# Patient Record
Sex: MALE | Race: WHITE | NOT HISPANIC OR LATINO | Employment: OTHER | URBAN - METROPOLITAN AREA
[De-identification: names, ages, dates, MRNs, and addresses within clinical notes are randomized per-mention and may not be internally consistent; named-entity substitution may affect disease eponyms.]

---

## 2019-07-24 ENCOUNTER — OFFICE VISIT (OUTPATIENT)
Dept: CARDIOLOGY | Facility: CLINIC | Age: 84
End: 2019-07-24

## 2019-07-24 VITALS
SYSTOLIC BLOOD PRESSURE: 120 MMHG | BODY MASS INDEX: 31.07 KG/M2 | HEART RATE: 60 BPM | HEIGHT: 70 IN | WEIGHT: 217 LBS | DIASTOLIC BLOOD PRESSURE: 68 MMHG

## 2019-07-24 DIAGNOSIS — E78.5 HYPERLIPIDEMIA, UNSPECIFIED HYPERLIPIDEMIA TYPE: ICD-10-CM

## 2019-07-24 DIAGNOSIS — I10 ESSENTIAL HYPERTENSION: ICD-10-CM

## 2019-07-24 DIAGNOSIS — E11.9 TYPE 2 DIABETES MELLITUS WITHOUT COMPLICATION, WITHOUT LONG-TERM CURRENT USE OF INSULIN (HCC): ICD-10-CM

## 2019-07-24 DIAGNOSIS — Z95.1 HISTORY OF CORONARY ARTERY BYPASS SURGERY: ICD-10-CM

## 2019-07-24 DIAGNOSIS — I25.10 CORONARY ARTERY DISEASE INVOLVING NATIVE CORONARY ARTERY OF NATIVE HEART WITHOUT ANGINA PECTORIS: Primary | ICD-10-CM

## 2019-07-24 PROCEDURE — 99213 OFFICE O/P EST LOW 20 MIN: CPT | Performed by: INTERNAL MEDICINE

## 2019-07-24 NOTE — PROGRESS NOTES
[facility] HEART SPECIALISTS        Subjective:     Encounter Date:07/24/2019      Patient ID: Shaheen Saini is a 84 y.o. male.      HPI: I saw Shaheen Saini today for continued cardiovascular care.  He is a pleasant 84-year-old male with a history of coronary heart disease.  He underwent coronary artery bypass surgery in 2005.  He remains active and is free of angina.  He does not report any significant or progressive dyspnea on exertion and is free of orthopnea or PND no lower extremity edema.  He denies syncope near syncope or palpitation.  We obtain an echocardiogram January 2018 which demonstrated preserved left ventricular function mild mitral insufficiency mild to moderate tricuspid insufficiency.  His last stress test was in 2012.  He is resistant to undergoing a stress test.    He has a history of dyslipidemia and remains on atorvastatin 20 mg daily.  He has type 2 diabetes and his diet and oral agent control.  He has a history of essential hypertension which is controlled with salt restriction atenolol and lisinopril.      The following portions of the patient's history were reviewed and updated as appropriate: allergies, current medications, past family history, past medical history, past social history, past surgical history and problem list.    Problem List:  Patient Active Problem List   Diagnosis   • Diabetes mellitus (CMS/[facility])   • Hyperlipidemia   • Hypertension   • GERD (gastroesophageal reflux disease)   • CAD (coronary artery disease)   • History of echocardiogram   • History of cardiac cath   • History of coronary artery bypass surgery       Past Medical History:  Past Medical History:   Diagnosis Date   • CAD (coronary artery disease)    • Diabetes mellitus (CMS/HCC)    • GERD (gastroesophageal reflux disease)    • History of cardiac cath     7/23/12 - LVEF 65%. 1+ MV insufficiency. Three vessel CAD. Three of four bypass grafts remain patent. The vein graft to the third marginal branch is a  previously documented occlusion. The distal circumflex is unchanged from angiogram 5/4/09. 5/4/09 - LVEF 65%.Three vessel CAD s/p CABG. One fraft to marginal branch is occluded, the rest of the grafts are patent. The marginal branch is diffusely dis   • History of echocardiogram     1/9/18 - LA mildly dilated. Normal LV function. Mild MR. Mild-moderate TR. Mild AR. 7/22/2012 - EF 60-65%. Moderate MI, TI, and PI. Mild AI.   • Hyperlipidemia    • Hypertension    • Paroxysmal atrial fibrillation (CMS/HCC)        Past Surgical History:  Past Surgical History:   Procedure Laterality Date   • CORONARY ARTERY BYPASS GRAFT  09/06/2005    CABG x4. Internal mammary artery to LAD, vein graft to diagonal artery, vein graft to OM, vein graft to posterior descending artery.       Social History:  Social History     Socioeconomic History   • Marital status:      Spouse name: Not on file   • Number of children: Not on file   • Years of education: Not on file   • Highest education level: Not on file   Tobacco Use   • Smoking status: Never Smoker   Substance and Sexual Activity   • Alcohol use: No     Frequency: Never       Allergies:  No Known Allergies      ROS:  Review of Systems   Constitution: Negative for weakness and malaise/fatigue.   HENT: Negative for hearing loss and nosebleeds.    Eyes: Negative for double vision and visual disturbance.   Cardiovascular: Negative for chest pain, claudication, dyspnea on exertion, near-syncope, orthopnea, palpitations, paroxysmal nocturnal dyspnea and syncope.   Respiratory: Negative for cough, shortness of breath, sleep disturbances due to breathing, snoring, sputum production and wheezing.    Endocrine: Negative for cold intolerance, heat intolerance, polydipsia and polyuria.   Hematologic/Lymphatic: Negative for adenopathy and bleeding problem. Does not bruise/bleed easily.   Skin: Negative for flushing and itching.   Musculoskeletal: Negative for back pain, falls, joint pain,  "joint swelling, muscle weakness and neck pain.   Gastrointestinal: Negative for abdominal pain, dysphagia, heartburn, nausea and vomiting.   Genitourinary: Negative for dysuria and frequency.   Neurological: Negative for disturbances in coordination, dizziness, light-headedness, loss of balance and numbness.   Psychiatric/Behavioral: Negative for altered mental status and memory loss. The patient is not nervous/anxious.    Allergic/Immunologic: Negative for hives and persistent infections.          Objective:         /68   Pulse 60   Ht 177.8 cm (70\")   Wt 98.4 kg (217 lb)   BMI 31.14 kg/m²     Physical Exam   Cardiovascular: Exam reveals gallop and S4.   1/6 systolic murmur left sternal border       In-Office Procedure(s):  Procedures    ASCVD RIsk Score::  The ASCVD Risk score (Suleonela MEEK Jr., et al., 2013) failed to calculate for the following reasons:    The 2013 ASCVD risk score is only valid for ages 40 to 79        Assessment/Plan:         1. Coronary artery disease involving native coronary artery of native heart without angina pectoris  Stable free of angina    2. History of coronary artery bypass surgery  Stable    3. Essential hypertension  Controlled    4. Hyperlipidemia, unspecified hyperlipidemia type  Remains on atorvastatin 20 mg daily followed by Dr. Lawrence    5. Type 2 diabetes mellitus without complication, without long-term current use of insulin (CMS/[facility])  Diet and oral agent    Mr. Saini remains active he works in his yard and remains free of angina or any respiratory insufficiency.  He does not wish to undergo noninvasive ischemic assessment even though his last stress test was in 2012 I made no changes in his medicines have encouraged him to maintain his  activity and risk modification.  I will see him in follow-up in 6 months.  He will contact us if there is any change in his energy level respiratory status or chest symptoms.           Tommy Ellis MD  07/24/19  .    "

## 2019-08-26 DIAGNOSIS — I50.9 CONGESTIVE HEART FAILURE, UNSPECIFIED HF CHRONICITY, UNSPECIFIED HEART FAILURE TYPE (HCC): Primary | ICD-10-CM

## 2019-08-26 DIAGNOSIS — I25.10 CORONARY ARTERY DISEASE INVOLVING NATIVE CORONARY ARTERY OF NATIVE HEART WITHOUT ANGINA PECTORIS: ICD-10-CM

## 2019-08-26 RX ORDER — RANOLAZINE 500 MG/1
500 TABLET, EXTENDED RELEASE ORAL 2 TIMES DAILY
Qty: 180 TABLET | Refills: 1 | Status: SHIPPED | OUTPATIENT
Start: 2019-08-26 | End: 2020-02-05 | Stop reason: SDUPTHER

## 2020-01-13 ENCOUNTER — APPOINTMENT (OUTPATIENT)
Dept: GENERAL RADIOLOGY | Facility: HOSPITAL | Age: 85
End: 2020-01-13

## 2020-01-13 PROCEDURE — 71046 X-RAY EXAM CHEST 2 VIEWS: CPT | Performed by: EMERGENCY MEDICINE

## 2020-01-31 ENCOUNTER — OFFICE VISIT (OUTPATIENT)
Dept: CARDIOLOGY | Facility: CLINIC | Age: 85
End: 2020-01-31

## 2020-01-31 VITALS
RESPIRATION RATE: 16 BRPM | BODY MASS INDEX: 30.64 KG/M2 | SYSTOLIC BLOOD PRESSURE: 122 MMHG | WEIGHT: 214 LBS | DIASTOLIC BLOOD PRESSURE: 58 MMHG | HEART RATE: 63 BPM | HEIGHT: 70 IN

## 2020-01-31 DIAGNOSIS — E11.9 TYPE 2 DIABETES MELLITUS WITHOUT COMPLICATION, WITHOUT LONG-TERM CURRENT USE OF INSULIN (HCC): ICD-10-CM

## 2020-01-31 DIAGNOSIS — Z95.1 HISTORY OF CORONARY ARTERY BYPASS SURGERY: ICD-10-CM

## 2020-01-31 DIAGNOSIS — I10 ESSENTIAL HYPERTENSION: ICD-10-CM

## 2020-01-31 DIAGNOSIS — I35.1 AORTIC VALVE INSUFFICIENCY, ETIOLOGY OF CARDIAC VALVE DISEASE UNSPECIFIED: ICD-10-CM

## 2020-01-31 DIAGNOSIS — E78.5 HYPERLIPIDEMIA, UNSPECIFIED HYPERLIPIDEMIA TYPE: ICD-10-CM

## 2020-01-31 DIAGNOSIS — I25.10 CORONARY ARTERY DISEASE INVOLVING NATIVE CORONARY ARTERY OF NATIVE HEART WITHOUT ANGINA PECTORIS: Primary | ICD-10-CM

## 2020-01-31 PROCEDURE — 99213 OFFICE O/P EST LOW 20 MIN: CPT | Performed by: INTERNAL MEDICINE

## 2020-01-31 NOTE — PROGRESS NOTES
[facility] HEART SPECIALISTS        Subjective:     Encounter Date:01/31/2020      Patient ID: Shaheen Saini is a 84 y.o. male.      HPI: I saw Shaheen Saini today for cardiovascular care.  Mr. Saini is a pleasant and active 84-year-old male.  He reports dyspnea on exertion but he feels this has remained stable.  He does not report orthopnea or PND no lower extremity edema.  He has a history of coronary disease and underwent coronary artery bypass surgery in 2005.  He denies chest pain pressure or tightness.  It is been 8 years since his last stress test.  He remains resistant to proceeding with stress testing.  He is free of syncope near syncope or palpitations.  He tolerates his medications well.  His lipids are monitored by Dr. Pal Lawrence.  Mr. Saini remains on atorvastatin 20 mg daily.      The following portions of the patient's history were reviewed and updated as appropriate: allergies, current medications, past family history, past medical history, past social history, past surgical history and problem list.    Problem List:  Patient Active Problem List   Diagnosis   • Diabetes mellitus (CMS/[facility])   • Hyperlipidemia   • Hypertension   • GERD (gastroesophageal reflux disease)   • CAD (coronary artery disease)   • History of echocardiogram   • History of cardiac cath   • History of coronary artery bypass surgery       Past Medical History:  Past Medical History:   Diagnosis Date   • CAD (coronary artery disease)    • Diabetes mellitus (CMS/[facility])    • GERD (gastroesophageal reflux disease)    • History of cardiac cath     7/23/12 - LVEF 65%. 1+ MV insufficiency. Three vessel CAD. Three of four bypass grafts remain patent. The vein graft to the third marginal branch is a previously documented occlusion. The distal circumflex is unchanged from angiogram 5/4/09. 5/4/09 - LVEF 65%.Three vessel CAD s/p CABG. One fraft to marginal branch is occluded, the rest of the grafts are patent. The marginal branch is  diffusely dis   • History of echocardiogram     1/9/18 - LA mildly dilated. Normal LV function. Mild MR. Mild-moderate TR. Mild AR. 7/22/2012 - EF 60-65%. Moderate MI, TI, and PI. Mild AI.   • Hyperlipidemia    • Hypertension    • Paroxysmal atrial fibrillation (CMS/HCC)        Past Surgical History:  Past Surgical History:   Procedure Laterality Date   • CORONARY ARTERY BYPASS GRAFT  09/06/2005    CABG x4. Internal mammary artery to LAD, vein graft to diagonal artery, vein graft to OM, vein graft to posterior descending artery.       Social History:  Social History     Socioeconomic History   • Marital status:      Spouse name: Not on file   • Number of children: Not on file   • Years of education: Not on file   • Highest education level: Not on file   Tobacco Use   • Smoking status: Never Smoker   Substance and Sexual Activity   • Alcohol use: No     Frequency: Never       Allergies:  No Known Allergies      ROS:  Review of Systems   Constitution: Negative for malaise/fatigue.   HENT: Negative for hearing loss and nosebleeds.    Eyes: Negative for double vision and visual disturbance.   Cardiovascular: Positive for dyspnea on exertion. Negative for chest pain, claudication, near-syncope, orthopnea, palpitations, paroxysmal nocturnal dyspnea and syncope.   Respiratory: Negative for cough, shortness of breath, sleep disturbances due to breathing, snoring, sputum production and wheezing.    Endocrine: Negative for cold intolerance, heat intolerance, polydipsia and polyuria.   Hematologic/Lymphatic: Negative for adenopathy and bleeding problem. Does not bruise/bleed easily.   Skin: Negative for flushing and itching.   Musculoskeletal: Negative for back pain, falls, joint pain, joint swelling, muscle weakness and neck pain.   Gastrointestinal: Negative for abdominal pain, dysphagia, heartburn, nausea and vomiting.   Genitourinary: Negative for dysuria and frequency.   Neurological: Negative for disturbances in  "coordination, dizziness, light-headedness, loss of balance, numbness and weakness.   Psychiatric/Behavioral: Negative for altered mental status and memory loss. The patient is not nervous/anxious.    Allergic/Immunologic: Negative for hives and persistent infections.          Objective:         /58   Pulse 63   Resp 16   Ht 177.8 cm (70\")   Wt 97.1 kg (214 lb)   BMI 30.71 kg/m²     Physical Exam   Constitutional: He is oriented to person, place, and time. He appears well-developed and well-nourished.   HENT:   Head: Normocephalic and atraumatic.   Eyes: Pupils are equal, round, and reactive to light. Conjunctivae and EOM are normal.   Neck: Neck supple. No thyromegaly present.   Cardiovascular: Normal rate, regular rhythm, S1 normal, S2 normal and intact distal pulses. PMI is not displaced. Exam reveals gallop and S4. Exam reveals no S3, no distant heart sounds, no friction rub, no midsystolic click and no opening snap.   No murmur heard.  Pulses:       Carotid pulses are 2+ on the right side, and 2+ on the left side.       Radial pulses are 2+ on the right side, and 2+ on the left side.        Femoral pulses are 2+ on the right side, and 2+ on the left side.       Dorsalis pedis pulses are 2+ on the right side, and 2+ on the left side.        Posterior tibial pulses are 2+ on the right side, and 2+ on the left side.   1/6 systolic murmur left sternal border   Pulmonary/Chest: Effort normal and breath sounds normal. No respiratory distress. He has no wheezes. He has no rales.   Abdominal: Soft. Bowel sounds are normal. He exhibits no distension and no mass. There is no tenderness.   Musculoskeletal: Normal range of motion. He exhibits no edema.   Neurological: He is alert and oriented to person, place, and time.   Skin: Skin is warm and dry. No erythema.   Psychiatric: He has a normal mood and affect.       In-Office Procedure(s):  Procedures    ASCVD RIsk Score::  The ASCVD Risk score ([place] FANTASMA Barrett, et " al., 2013) failed to calculate for the following reasons:    The 2013 ASCVD risk score is only valid for ages 40 to 79        Assessment/Plan:         1. Coronary artery disease involving native coronary artery of native heart without angina pectoris  Stable    2. History of coronary artery bypass surgery  Stable    3. Hyperlipidemia, unspecified hyperlipidemia type  Continue low-cholesterol low saturated fat diet and atorvastatin 20 mg daily weight reduction    4. Essential hypertension  Controlled    5. Type 2 diabetes mellitus without complication, without long-term current use of insulin (CMS/[facility])  Diet, weight reduction and continue oral agent    6. Aortic valve insufficiency, etiology of cardiac valve disease unspecified  Stable    Mr. Saini is free of angina euvolemic and active.  I have discussed with him the importance of continued risk modification.  His blood pressures controlled I recommended LDL level less than 70 HDL less than 150 and continued efforts at weight reduction.  He is resistant to any further cardiac testing at present.  I will see him in 6 months.       Tommy Ellis MD  01/31/20  .

## 2020-02-05 DIAGNOSIS — I25.10 CORONARY ARTERY DISEASE INVOLVING NATIVE CORONARY ARTERY OF NATIVE HEART WITHOUT ANGINA PECTORIS: ICD-10-CM

## 2020-02-05 RX ORDER — RANOLAZINE 500 MG/1
500 TABLET, EXTENDED RELEASE ORAL 2 TIMES DAILY
Qty: 180 TABLET | Refills: 1 | Status: SHIPPED | OUTPATIENT
Start: 2020-02-05 | End: 2020-08-06 | Stop reason: SDUPTHER

## 2020-07-29 ENCOUNTER — TELEMEDICINE (OUTPATIENT)
Dept: CARDIOLOGY | Facility: CLINIC | Age: 85
End: 2020-07-29

## 2020-07-29 VITALS
DIASTOLIC BLOOD PRESSURE: 61 MMHG | HEIGHT: 70 IN | BODY MASS INDEX: 30.35 KG/M2 | WEIGHT: 212 LBS | HEART RATE: 57 BPM | SYSTOLIC BLOOD PRESSURE: 136 MMHG

## 2020-07-29 DIAGNOSIS — I25.10 CORONARY ARTERY DISEASE INVOLVING NATIVE CORONARY ARTERY OF NATIVE HEART WITHOUT ANGINA PECTORIS: Primary | ICD-10-CM

## 2020-07-29 DIAGNOSIS — Z95.1 HISTORY OF CORONARY ARTERY BYPASS SURGERY: ICD-10-CM

## 2020-07-29 DIAGNOSIS — I10 ESSENTIAL HYPERTENSION: ICD-10-CM

## 2020-07-29 DIAGNOSIS — E78.5 HYPERLIPIDEMIA, UNSPECIFIED HYPERLIPIDEMIA TYPE: ICD-10-CM

## 2020-07-29 DIAGNOSIS — E11.9 TYPE 2 DIABETES MELLITUS WITHOUT COMPLICATION, WITHOUT LONG-TERM CURRENT USE OF INSULIN (HCC): ICD-10-CM

## 2020-07-29 PROCEDURE — 99441 PR PHYS/QHP TELEPHONE EVALUATION 5-10 MIN: CPT | Performed by: INTERNAL MEDICINE

## 2020-07-29 NOTE — PROGRESS NOTES
[facility] HEART SPECIALISTS    You have chosen to receive care through a telephone visit. Do you consent to use a telephone visit for your medical care today? Yes    Subjective:     Encounter Date:07/29/2020      Patient ID: Shaheen Saini is a 85 y.o. male.      HPI: Shaheen Saini is a pleasant 85-year-old male who is observing the CDC guidelines for social distancing.  He remains free of fever cough or increased shortness of breath.  He does not report any change in his sense of smell or taste.  Mr. Saldana continues to mow his yard and is free of any chest pain pressure or tightness.  He has his baseline dyspnea on exertion but this is not progressive.  He is free of orthopnea or PND.  He does not report syncope near syncope or palpitations.  He tolerates his medications well.  His lipids remain adequately controlled on atorvastatin 20 mg daily.  He has a history of hypertension which remains controlled.      The following portions of the patient's history were reviewed and updated as appropriate: allergies, current medications, past family history, past medical history, past social history, past surgical history and problem list.    Problem List:  Patient Active Problem List   Diagnosis   • Diabetes mellitus (CMS/HCC)   • Hyperlipidemia   • Hypertension   • GERD (gastroesophageal reflux disease)   • CAD (coronary artery disease)   • History of echocardiogram   • History of cardiac cath   • History of coronary artery bypass surgery       Past Medical History:  Past Medical History:   Diagnosis Date   • CAD (coronary artery disease)    • Diabetes mellitus (CMS/HCC)    • GERD (gastroesophageal reflux disease)    • History of cardiac cath     7/23/12 - LVEF 65%. 1+ MV insufficiency. Three vessel CAD. Three of four bypass grafts remain patent. The vein graft to the third marginal branch is a previously documented occlusion. The distal circumflex is unchanged from angiogram 5/4/09. 5/4/09 - LVEF 65%.Three vessel CAD  "s/p CABG. One fraft to marginal branch is occluded, the rest of the grafts are patent. The marginal branch is diffusely dis   • History of echocardiogram     1/9/18 - LA mildly dilated. Normal LV function. Mild MR. Mild-moderate TR. Mild AR. 7/22/2012 - EF 60-65%. Moderate MI, TI, and PI. Mild AI.   • Hyperlipidemia    • Hypertension    • Paroxysmal atrial fibrillation (CMS/HCC)        Past Surgical History:  Past Surgical History:   Procedure Laterality Date   • CORONARY ARTERY BYPASS GRAFT  09/06/2005    CABG x4. Internal mammary artery to LAD, vein graft to diagonal artery, vein graft to OM, vein graft to posterior descending artery.       Social History:  Social History     Socioeconomic History   • Marital status:      Spouse name: Not on file   • Number of children: Not on file   • Years of education: Not on file   • Highest education level: Not on file   Tobacco Use   • Smoking status: Never Smoker   • Smokeless tobacco: Never Used   Substance and Sexual Activity   • Alcohol use: No     Frequency: Never   • Drug use: Never   • Sexual activity: Defer       Allergies:  No Known Allergies      ROS:  ROS       Objective:         /61   Pulse 57   Ht 177.8 cm (70\")   Wt 96.2 kg (212 lb)   BMI 30.42 kg/m²     Physical Exam    In-Office Procedure(s):  Procedures    ASCVD RIsk Score::  The ASCVD Risk score ([place] FANTASMA Jr., et al., 2013) failed to calculate for the following reasons:    The 2013 ASCVD risk score is only valid for ages 40 to 79        Assessment/Plan:         1. Coronary artery disease involving native coronary artery of native heart without angina pectoris  Stable free of angina    2. History of coronary artery bypass surgery  Stable    3. Essential hypertension  Controlled    4. Hyperlipidemia, unspecified hyperlipidemia type  Controlled    5. Type 2 diabetes mellitus without complication, without long-term current use of insulin (CMS/HCC)  Encourage weight reduction and continue oral " agent    Mr. Camarillo has maintained a good activity level while observing the CDC guidelines for social distancing.  He is free of angina and does not report any significant or progressive respiratory insufficiency.  He tolerates his medications well he understands importance of continued risk modification.  I made no changes in his medication I will see him in follow-up in 6 months.    I spent 8 minutes on telephone visit in 8 minutes completing electronic medical record documentation         Tommy Ellis MD  07/29/20  .

## 2020-08-06 ENCOUNTER — TELEPHONE (OUTPATIENT)
Dept: CARDIOLOGY | Facility: CLINIC | Age: 85
End: 2020-08-06

## 2020-08-06 DIAGNOSIS — I25.10 CORONARY ARTERY DISEASE INVOLVING NATIVE CORONARY ARTERY OF NATIVE HEART WITHOUT ANGINA PECTORIS: ICD-10-CM

## 2020-08-06 RX ORDER — RANOLAZINE 500 MG/1
500 TABLET, EXTENDED RELEASE ORAL 2 TIMES DAILY
Qty: 180 TABLET | Refills: 3 | Status: SHIPPED | OUTPATIENT
Start: 2020-08-06 | End: 2020-08-21

## 2020-08-21 DIAGNOSIS — I25.10 CORONARY ARTERY DISEASE INVOLVING NATIVE CORONARY ARTERY OF NATIVE HEART WITHOUT ANGINA PECTORIS: ICD-10-CM

## 2020-08-21 RX ORDER — RANOLAZINE 500 MG/1
TABLET, EXTENDED RELEASE ORAL
Qty: 180 TABLET | Refills: 1 | Status: SHIPPED | OUTPATIENT
Start: 2020-08-21 | End: 2021-08-24

## 2021-02-02 PROBLEM — I35.1 MILD AORTIC INSUFFICIENCY: Status: ACTIVE | Noted: 2021-02-02

## 2021-02-02 PROBLEM — Z95.1 HISTORY OF CORONARY ARTERY BYPASS SURGERY: Chronic | Status: ACTIVE | Noted: 2019-07-24

## 2021-02-02 PROBLEM — I35.1 MILD AORTIC INSUFFICIENCY: Chronic | Status: ACTIVE | Noted: 2021-02-02

## 2021-02-03 ENCOUNTER — TELEMEDICINE (OUTPATIENT)
Dept: CARDIOLOGY | Facility: CLINIC | Age: 86
End: 2021-02-03

## 2021-02-03 VITALS
WEIGHT: 212 LBS | HEART RATE: 55 BPM | HEIGHT: 70 IN | DIASTOLIC BLOOD PRESSURE: 66 MMHG | SYSTOLIC BLOOD PRESSURE: 136 MMHG | BODY MASS INDEX: 30.35 KG/M2

## 2021-02-03 DIAGNOSIS — I25.10 CORONARY ARTERY DISEASE INVOLVING NATIVE CORONARY ARTERY OF NATIVE HEART WITHOUT ANGINA PECTORIS: Primary | Chronic | ICD-10-CM

## 2021-02-03 DIAGNOSIS — I10 ESSENTIAL HYPERTENSION: Chronic | ICD-10-CM

## 2021-02-03 DIAGNOSIS — I35.1 MILD AORTIC INSUFFICIENCY: Chronic | ICD-10-CM

## 2021-02-03 DIAGNOSIS — E78.5 HYPERLIPIDEMIA, UNSPECIFIED HYPERLIPIDEMIA TYPE: Chronic | ICD-10-CM

## 2021-02-03 DIAGNOSIS — Z95.1 HISTORY OF CORONARY ARTERY BYPASS SURGERY: Chronic | ICD-10-CM

## 2021-02-03 DIAGNOSIS — E11.9 TYPE 2 DIABETES MELLITUS WITHOUT COMPLICATION, WITHOUT LONG-TERM CURRENT USE OF INSULIN (HCC): Chronic | ICD-10-CM

## 2021-02-03 PROCEDURE — 99443 PR PHYS/QHP TELEPHONE EVALUATION 21-30 MIN: CPT | Performed by: INTERNAL MEDICINE

## 2021-02-03 NOTE — PROGRESS NOTES
"Chief Complaint  Coronary Artery Disease, Hypertension, Hyperlipidemia, and Diabetes    Subjective    History of Present Illness      Shaheen Saini presents to [facility] CARDIOLOG[facility] for telephone clinical visit secondary to the coronavirus pandemic.  He is a pleasant 85-year-old male who observes the CDC guidelines during the coronavirus pandemic.  Mr. Saini has received his first of 2 COVID-19 vaccine injections.  He is free of any chest pain pressure or tightness.  He does not experience any significant or progressive dyspnea on exertion.  He denies orthopnea, PND or lower extremity edema.  He has not experienced syncope near syncope or palpitations.  He has a history of coronary heart disease and underwent coronary artery bypass surgery on 9/6/2005 with a LIMA to the LAD, saphenous vein graft to diagonal, circumflex marginal branch, the right coronary artery PDA branch he has a history of type 2 diabetes managed with diet and glipizide.  He has hyperlipidemia and is on atorvastatin 20 mg daily.  He has a history of hypertension which remains adequately controlled.  His last diagnostic testing was an echocardiogram 1/9/2018 which revealed normal left ventricular contractility mild aortic and mitral insufficiency, mild to moderate tricuspid insufficiency.His last nuclear stress test was 7/22/2012 which revealed ejection fraction 72%, large moderate to severe inferior inferolateral wall defect cannot exclude ischemia.  He underwent coronary angiography 7/23/2012 revealing three-vessel coronary artery disease with 3 of 4 bypasses patent the vein graft to the marginal branch was occluded.    Objective   Vital Signs:   /66   Pulse 55   Ht 177.8 cm (70\")   Wt 96.2 kg (212 lb)   BMI 30.42 kg/m²     Physical Exam not performed  Result Review :   The following data was reviewed by: Tommy Ellis MD on 02/03/2021:  Common labs    Common Labsle 3/5/20 3/5/20 3/5/20 6/16/20    0940 0940 0940  "   Glucose   139 (A)    BUN   20    Creatinine   1.0    Sodium   137    Potassium   4.9    Chloride   100    Calcium   9.3    Total Cholesterol  151     Triglycerides  129     HDL Cholesterol  34 (A)     LDL Cholesterol   91     Hemoglobin A1C 6.6 (A)   6.3 (A)   (A) Abnormal value       Comments are available for some flowsheets but are not being displayed.           Data reviewed: Cardiology studies Nuclear stress test 7/22/2012, coronary angiography 7/23/2012, echocardiogram 1/9/2018.          Assessment and Plan    1. Coronary artery disease involving native coronary artery of native heart without angina pectoris  Stable free of angina    2. History of coronary artery bypass surgery  Stable    3. Essential hypertension  Controlled    4. Hyperlipidemia, unspecified hyperlipidemia type  Controlled    5. Type 2 diabetes mellitus without complication, without long-term current use of insulin (CMS/[facility])  Continue diet and oral agent    6. Mild aortic insufficiency  Compensated    Mr. Saini is in good spirits free of angina he does not report any significant respiratory insufficiency.  He tolerates his medications well.  His blood pressure is adequately controlled and his lipids are monitored by his primary care physician.  I have previously recommended we obtain a noninvasive ischemic assessment but he is declined.  I will plan to see him in follow-up in 6 months sooner if needed.      I spent 23 minutes caring for Shaheen on this date of service. This time includes time spent by me in the following activities:preparing for the visit, reviewing tests, counseling and educating the patient/family/caregiver, documenting information in the medical record and independently interpreting results and communicating that information with the patient/family/caregiver  Follow Up   No follow-ups on file.  Patient was given instructions and counseling regarding his condition or for health maintenance advice. Please see specific  information pulled into the AVS if appropriate.

## 2021-08-04 ENCOUNTER — OFFICE VISIT (OUTPATIENT)
Dept: CARDIOLOGY | Facility: CLINIC | Age: 86
End: 2021-08-04

## 2021-08-04 VITALS
RESPIRATION RATE: 18 BRPM | HEART RATE: 60 BPM | HEIGHT: 70 IN | SYSTOLIC BLOOD PRESSURE: 130 MMHG | BODY MASS INDEX: 30.81 KG/M2 | OXYGEN SATURATION: 96 % | WEIGHT: 215.2 LBS | DIASTOLIC BLOOD PRESSURE: 60 MMHG

## 2021-08-04 DIAGNOSIS — Z95.1 HISTORY OF CORONARY ARTERY BYPASS SURGERY: ICD-10-CM

## 2021-08-04 DIAGNOSIS — I35.1 MILD AORTIC INSUFFICIENCY: ICD-10-CM

## 2021-08-04 DIAGNOSIS — E11.9 TYPE 2 DIABETES MELLITUS WITHOUT COMPLICATION, WITHOUT LONG-TERM CURRENT USE OF INSULIN (HCC): ICD-10-CM

## 2021-08-04 DIAGNOSIS — I10 ESSENTIAL HYPERTENSION: ICD-10-CM

## 2021-08-04 DIAGNOSIS — I35.1 AORTIC VALVE INSUFFICIENCY, ETIOLOGY OF CARDIAC VALVE DISEASE UNSPECIFIED: ICD-10-CM

## 2021-08-04 DIAGNOSIS — E78.5 HYPERLIPIDEMIA, UNSPECIFIED HYPERLIPIDEMIA TYPE: ICD-10-CM

## 2021-08-04 DIAGNOSIS — I25.10 CORONARY ARTERY DISEASE INVOLVING NATIVE CORONARY ARTERY OF NATIVE HEART WITHOUT ANGINA PECTORIS: Primary | ICD-10-CM

## 2021-08-04 PROCEDURE — 99214 OFFICE O/P EST MOD 30 MIN: CPT | Performed by: INTERNAL MEDICINE

## 2021-08-04 NOTE — PROGRESS NOTES
"Chief Complaint  Coronary Artery Disease    Subjective    History of Present Illness      I saw Shaheen Saini today for continued cardiovascular care.  He is a very pleasant 86-year-old male who is very active.  He denies chest pain pressure or tightness.  He does not report any unusual or progressive dyspnea on exertion.  He has known coronary heart disease and is undergone previous coronary artery bypass surgery in 2005.  He has been reluctant and in fact resistant to noninvasive ischemic assessment since his last one in 2012.  He does not report orthopnea or PND.  No syncope or palpitations he reports very mild orthostatic dizziness if he bends over for an extended period of time and then stands up.  He is blood pressure is controlled.  He has a history of hyperlipidemia and is on atorvastatin 20 mg daily.  His lipids were assessed 7/14/2021 and are controlled.    Objective   Vital Signs:   /60   Pulse 60   Resp 18   Ht 177.8 cm (70\")   Wt 97.6 kg (215 lb 3.2 oz)   SpO2 96%   BMI 30.88 kg/m²     Constitutional:       Appearance: Well-developed.   Eyes:      Conjunctiva/sclera: Conjunctivae normal.      Pupils: Pupils are equal, round, and reactive to light.   HENT:      Head: Normocephalic and atraumatic.   Neck:      Thyroid: No thyromegaly.   Pulmonary:      Effort: Pulmonary effort is normal. No respiratory distress.      Breath sounds: Normal breath sounds. No wheezing. No rales.   Cardiovascular:      Normal rate. Regular rhythm.      S4 Gallop. No S3 gallop. Midsystolic click click.   Edema:     Peripheral edema absent.   Abdominal:      General: Bowel sounds are normal. There is no distension.      Palpations: Abdomen is soft. There is no abdominal mass.      Tenderness: There is no abdominal tenderness.   Musculoskeletal: Normal range of motion.      Cervical back: Neck supple. Skin:     General: Skin is warm and dry.      Findings: No erythema.   Neurological:      Mental Status: Alert and " oriented to person, place, and time.         Result Review :     Common labs    Common Labsle 7/14/21 7/14/21    0850 0850   Glucose 163 (A)    BUN 22 (A)    Creatinine 1.0    Sodium 136 (A)    Potassium 5.3 (A)    Chloride 99    Calcium 9.7    Hemoglobin A1C  6.5 (A)   (A) Abnormal value       Comments are available for some flowsheets but are not being displayed.                     Assessment and Plan    1. Coronary artery disease involving native coronary artery of native heart without angina pectoris  Stable free of angina    2. History of coronary artery bypass surgery  Stable    3. Essential hypertension  Controlled    4. Hyperlipidemia, unspecified hyperlipidemia type  Controlled    5. Type 2 diabetes mellitus without complication, without long-term current use of insulin (CMS/[facility])  Controlled    6. Mild aortic insufficiency  Compensated    Mr. Saini remains stable from the cardiovascular standpoint.  He is free of angina and euvolemic on physical examination.  I have again counseled and encouraged him to observe a low-cholesterol low saturated fat diet and remain aerobically active.  He will continue his current medical regimen.  I will see him in follow-up in 6 months.  He will call notify me if he develops any chest discomfort or unexplained respiratory insufficiency.    Follow Up   No follow-ups on file.  Patient was given instructions and counseling regarding his condition or for health maintenance advice. Please see specific information pulled into the AVS if appropriate.

## 2021-08-24 DIAGNOSIS — I25.10 CORONARY ARTERY DISEASE INVOLVING NATIVE CORONARY ARTERY OF NATIVE HEART WITHOUT ANGINA PECTORIS: ICD-10-CM

## 2021-08-24 RX ORDER — RANOLAZINE 500 MG/1
TABLET, EXTENDED RELEASE ORAL
Qty: 180 TABLET | Refills: 3 | Status: SHIPPED | OUTPATIENT
Start: 2021-08-24 | End: 2022-10-07 | Stop reason: SDUPTHER

## 2022-03-07 NOTE — PROGRESS NOTES
"Chief Complaint  Coronary Artery Disease, Hypertension, Hyperlipidemia, and Diabetes    Subjective    History of Present Illness      I saw Shaheen Saini today for continued cardiovascular care.  He is a very pleasant 86-year-old male with known coronary heart disease.  Shaheen underwent complete revascularization with bypass surgery in 2005.  He remains active and free of angina.  He has been reluctant in the past to undergo noninvasive ischemic assessments despite my recommendation on several different occasions.  He does not report orthopnea or PND no lower extremity edema.  History of syncope near syncope or palpitation.  His blood pressure is controlled.  His lipids are monitored by his primary care physician, Dr. Pal Lawrence.  His lipids have remained well controlled    Objective   Vital Signs:   /64   Pulse 66   Resp 18   Ht 177.8 cm (70\")   Wt 98.9 kg (218 lb)   SpO2 96%   BMI 31.28 kg/m²     Constitutional:       Appearance: Well-developed.   Eyes:      Conjunctiva/sclera: Conjunctivae normal.      Pupils: Pupils are equal, round, and reactive to light.   HENT:      Head: Normocephalic and atraumatic.   Neck:      Thyroid: No thyromegaly.   Pulmonary:      Effort: Pulmonary effort is normal. No respiratory distress.      Breath sounds: Normal breath sounds. No wheezing. No rales.   Cardiovascular:      Normal rate. Regular rhythm.      Murmurs: There is a grade 1/4 high frequency blowing decrescendo, early diastolic murmur at the URSB, radiating to the apex.      S4 Gallop. No S3 gallop. No rub.   Edema:     Peripheral edema absent.   Abdominal:      General: Bowel sounds are normal. There is no distension.      Palpations: Abdomen is soft. There is no abdominal mass.      Tenderness: There is no abdominal tenderness.   Musculoskeletal: Normal range of motion.      Cervical back: Neck supple. Skin:     General: Skin is warm and dry.      Findings: No erythema.   Neurological:      Mental " Status: Alert and oriented to person, place, and time.         Result Review :     Common labs    Common Labsle 7/14/21 7/14/21 1/19/22 1/19/22    0850 0850 1004 1004   Glucose 163 (A)      BUN 22 (A)      Creatinine 1.0      Sodium 136 (A)      Potassium 5.3 (A)      Chloride 99      Calcium 9.7      Hemoglobin A1C  6.5 (A) 6.6 (A)    PSA    1.01   (A) Abnormal value       Comments are available for some flowsheets but are not being displayed.                     Assessment and Plan    1. Coronary artery disease involving native coronary artery of native heart without angina pectoris  Stable free of angina    2. History of coronary artery bypass surgery  Stable    3. Essential hypertension  Controlled    4. Hyperlipidemia, unspecified hyperlipidemia type  Controlled    5. Type 2 diabetes mellitus without complication, without long-term current use of insulin (HCC)  Encourage weight reduction    6. Mild aortic insufficiency  Compensated    Shaheen is in good spirits remains active free of angina and is euvolemic on examination.  He will continue his current medical regimen.  I have encouraged him to advance his aerobic activity as warmer months approach.  I have counseled him to continue observing low-cholesterol low saturated fat diet with salt restriction.  I will see him in follow-up in 6 months sooner if needed        Follow Up   No follow-ups on file.  Patient was given instructions and counseling regarding his condition or for health maintenance advice. Please see specific information pulled into the AVS if appropriate.

## 2022-03-08 ENCOUNTER — OFFICE VISIT (OUTPATIENT)
Dept: CARDIOLOGY | Facility: CLINIC | Age: 87
End: 2022-03-08

## 2022-03-08 VITALS
HEIGHT: 70 IN | OXYGEN SATURATION: 96 % | BODY MASS INDEX: 31.21 KG/M2 | WEIGHT: 218 LBS | HEART RATE: 66 BPM | DIASTOLIC BLOOD PRESSURE: 64 MMHG | RESPIRATION RATE: 18 BRPM | SYSTOLIC BLOOD PRESSURE: 128 MMHG

## 2022-03-08 DIAGNOSIS — I10 ESSENTIAL HYPERTENSION: ICD-10-CM

## 2022-03-08 DIAGNOSIS — E11.9 TYPE 2 DIABETES MELLITUS WITHOUT COMPLICATION, WITHOUT LONG-TERM CURRENT USE OF INSULIN: ICD-10-CM

## 2022-03-08 DIAGNOSIS — I35.1 MILD AORTIC INSUFFICIENCY: ICD-10-CM

## 2022-03-08 DIAGNOSIS — Z95.1 HISTORY OF CORONARY ARTERY BYPASS SURGERY: ICD-10-CM

## 2022-03-08 DIAGNOSIS — I25.10 CORONARY ARTERY DISEASE INVOLVING NATIVE CORONARY ARTERY OF NATIVE HEART WITHOUT ANGINA PECTORIS: Primary | ICD-10-CM

## 2022-03-08 DIAGNOSIS — E78.5 HYPERLIPIDEMIA, UNSPECIFIED HYPERLIPIDEMIA TYPE: ICD-10-CM

## 2022-03-08 PROCEDURE — 99214 OFFICE O/P EST MOD 30 MIN: CPT | Performed by: INTERNAL MEDICINE

## 2022-09-13 NOTE — PROGRESS NOTES
"Chief Complaint  Coronary Artery Disease    Subjective    History of Present Illness      I saw Shaheen martel today for cardiovascular care.  He is a pleasant 87-year-old male who remains quite active.  He does not report chest pain pressure or tightness.  He does have his baseline dyspnea on exertion but is free of orthopnea or PND no lower extremity edema.  He denies syncope or palpitations.  He does report recent flare of his allergies with occasional dizziness upon turning his head.  He specifically denies any orthostatic dizziness.  He has known coronary heart disease status post coronary artery bypass surgery in 2005.  I have recommended nuclear stress testing on several occasions but he has been resistant to proceed.  His blood pressure is controlled.  He remains on atorvastatin 20 mg daily.    Objective   Vital Signs:   /68   Pulse 63   Ht 177.8 cm (70\")   Wt 98 kg (216 lb)   BMI 30.99 kg/m²     Constitutional:       Appearance: Well-developed.   Eyes:      Conjunctiva/sclera: Conjunctivae normal.      Pupils: Pupils are equal, round, and reactive to light.   HENT:      Head: Normocephalic and atraumatic.   Neck:      Thyroid: No thyromegaly.   Pulmonary:      Effort: Pulmonary effort is normal. No respiratory distress.      Breath sounds: Normal breath sounds. No wheezing. No rales.   Cardiovascular:      Normal rate. Regular rhythm.      Murmurs: There is a grade 1/4 high frequency blowing decrescendo, early diastolic murmur at the URSB, radiating to the apex.      S4 Gallop. No S3 gallop. No rub.   Edema:     Peripheral edema absent.   Abdominal:      General: Bowel sounds are normal. There is no distension.      Palpations: Abdomen is soft. There is no abdominal mass.      Tenderness: There is no abdominal tenderness.   Musculoskeletal: Normal range of motion.      Cervical back: Neck supple. Skin:     General: Skin is warm and dry.      Findings: No erythema.   Neurological:      Mental " Status: Alert and oriented to person, place, and time.         Result Review :     Common labs    Common Labsle 1/19/22 1/19/22 5/10/22 5/10/22 5/10/22 8/25/22    1004 1004 1040 1040 1040    Glucose     140 (A)    BUN     19    Creatinine    43.7 0.97    Sodium     132 (A)    Potassium     5.2 (A)    Chloride     97 (A)    Calcium     9.0    Hemoglobin A1C 6.6 (A)  6.8 (A)   7.0 (A)   Microalbumin, Urine    <1.2     PSA  1.01       (A) Abnormal value       Comments are available for some flowsheets but are not being displayed.         Fasting lipids 5/10/2022: Triglycerides 180, HDL 39, LDL 78.            Assessment and Plan    1. Coronary artery disease involving native coronary artery of native heart without angina pectoris  Stable free of angina    2. History of coronary artery bypass surgery  Stable    3. Mild aortic insufficiency  Reassess  - Adult Transthoracic Echo Complete W/ Color, Spectral and Contrast if Necessary Per Protocol; Future    4. Primary hypertension  Controlled    5. Hyperlipidemia, unspecified hyperlipidemia type  Low-cholesterol diet and continue atorvastatin    6. Type 2 diabetes mellitus without complication, without long-term current use of insulin ([facility])  Weight reduction    7. Dyspnea, unspecified type  Mildly progressive  - Adult Transthoracic Echo Complete W/ Color, Spectral and Contrast if Necessary Per Protocol; Future     Shaheen overall reports feeling well with some gradual progression of his dyspnea on exertion and generalized fatigue.  He again is reluctant to proceed with nuclear stress testing.  He will agree to an echocardiogram.  I have encouraged him to follow his current medical regimen.  We will arrange for follow-up in 6 months.    Follow Up   No follow-ups on file.  Patient was given instructions and counseling regarding his condition or for health maintenance advice. Please see specific information pulled into the AVS if appropriate.

## 2022-09-14 ENCOUNTER — OFFICE VISIT (OUTPATIENT)
Dept: CARDIOLOGY | Facility: CLINIC | Age: 87
End: 2022-09-14

## 2022-09-14 VITALS
SYSTOLIC BLOOD PRESSURE: 118 MMHG | HEIGHT: 70 IN | DIASTOLIC BLOOD PRESSURE: 68 MMHG | WEIGHT: 216 LBS | HEART RATE: 63 BPM | BODY MASS INDEX: 30.92 KG/M2

## 2022-09-14 DIAGNOSIS — I25.10 CORONARY ARTERY DISEASE INVOLVING NATIVE CORONARY ARTERY OF NATIVE HEART WITHOUT ANGINA PECTORIS: Primary | ICD-10-CM

## 2022-09-14 DIAGNOSIS — R06.00 DYSPNEA, UNSPECIFIED TYPE: ICD-10-CM

## 2022-09-14 DIAGNOSIS — E11.9 TYPE 2 DIABETES MELLITUS WITHOUT COMPLICATION, WITHOUT LONG-TERM CURRENT USE OF INSULIN: ICD-10-CM

## 2022-09-14 DIAGNOSIS — I35.1 MILD AORTIC INSUFFICIENCY: ICD-10-CM

## 2022-09-14 DIAGNOSIS — Z95.1 HISTORY OF CORONARY ARTERY BYPASS SURGERY: ICD-10-CM

## 2022-09-14 DIAGNOSIS — I10 PRIMARY HYPERTENSION: ICD-10-CM

## 2022-09-14 DIAGNOSIS — E78.5 HYPERLIPIDEMIA, UNSPECIFIED HYPERLIPIDEMIA TYPE: ICD-10-CM

## 2022-09-14 PROCEDURE — 99214 OFFICE O/P EST MOD 30 MIN: CPT | Performed by: INTERNAL MEDICINE

## 2022-09-14 RX ORDER — CETIRIZINE HYDROCHLORIDE 10 MG/1
10 TABLET ORAL DAILY
COMMUNITY

## 2022-10-03 ENCOUNTER — HOSPITAL ENCOUNTER (OUTPATIENT)
Dept: CARDIOLOGY | Facility: HOSPITAL | Age: 87
Discharge: HOME | End: 2022-10-03
Admitting: INTERNAL MEDICINE

## 2022-10-03 VITALS
BODY MASS INDEX: 29.26 KG/M2 | SYSTOLIC BLOOD PRESSURE: 118 MMHG | WEIGHT: 216 LBS | DIASTOLIC BLOOD PRESSURE: 68 MMHG | HEIGHT: 72 IN | HEART RATE: 60 BPM

## 2022-10-03 DIAGNOSIS — R06.00 DYSPNEA, UNSPECIFIED TYPE: ICD-10-CM

## 2022-10-03 DIAGNOSIS — I35.1 MILD AORTIC INSUFFICIENCY: ICD-10-CM

## 2022-10-03 PROCEDURE — 93306 TTE W/DOPPLER COMPLETE: CPT

## 2022-10-03 PROCEDURE — 25010000002 PERFLUTREN (DEFINITY) 8.476 MG IN SODIUM CHLORIDE (PF) 0.9 % 10 ML INJECTION: Performed by: INTERNAL MEDICINE

## 2022-10-03 PROCEDURE — 93306 TTE W/DOPPLER COMPLETE: CPT | Performed by: INTERNAL MEDICINE

## 2022-10-03 RX ADMIN — SODIUM CHLORIDE 2 ML: 9 INJECTION INTRAMUSCULAR; INTRAVENOUS; SUBCUTANEOUS at 10:29

## 2022-10-05 LAB
AORTIC ARCH: 2.9 CM
ASCENDING AORTA: 3.2 CM
BH CV ECHO MEAS - ACS: 1.38 CM
BH CV ECHO MEAS - AI P1/2T: 562.7 MSEC
BH CV ECHO MEAS - AO MAX PG: 14.7 MMHG
BH CV ECHO MEAS - AO MEAN PG: 7.7 MMHG
BH CV ECHO MEAS - AO ROOT DIAM: 3.3 CM
BH CV ECHO MEAS - AO V2 MAX: 191.4 CM/SEC
BH CV ECHO MEAS - AO V2 VTI: 48.1 CM
BH CV ECHO MEAS - AVA(I,D): 1.77 CM2
BH CV ECHO MEAS - EDV(CUBED): 72.9 ML
BH CV ECHO MEAS - EDV(MOD-SP2): 70 ML
BH CV ECHO MEAS - EDV(MOD-SP4): 112 ML
BH CV ECHO MEAS - EF(MOD-BP): 62.2 %
BH CV ECHO MEAS - EF(MOD-SP2): 61.4 %
BH CV ECHO MEAS - EF(MOD-SP4): 60.7 %
BH CV ECHO MEAS - ESV(CUBED): 27.1 ML
BH CV ECHO MEAS - ESV(MOD-SP2): 27 ML
BH CV ECHO MEAS - ESV(MOD-SP4): 44 ML
BH CV ECHO MEAS - FS: 28.1 %
BH CV ECHO MEAS - IVS/LVPW: 1.02 CM
BH CV ECHO MEAS - IVSD: 0.97 CM
BH CV ECHO MEAS - LAT PEAK E' VEL: 12.3 CM/SEC
BH CV ECHO MEAS - LV DIASTOLIC VOL/BSA (35-75): 50.9 CM2
BH CV ECHO MEAS - LV MASS(C)D: 128.7 GRAMS
BH CV ECHO MEAS - LV MAX PG: 4.4 MMHG
BH CV ECHO MEAS - LV MEAN PG: 2.43 MMHG
BH CV ECHO MEAS - LV SYSTOLIC VOL/BSA (12-30): 20 CM2
BH CV ECHO MEAS - LV V1 MAX: 104.5 CM/SEC
BH CV ECHO MEAS - LV V1 VTI: 28.1 CM
BH CV ECHO MEAS - LVIDD: 4.2 CM
BH CV ECHO MEAS - LVIDS: 3 CM
BH CV ECHO MEAS - LVOT AREA: 3 CM2
BH CV ECHO MEAS - LVOT DIAM: 1.97 CM
BH CV ECHO MEAS - LVPWD: 0.95 CM
BH CV ECHO MEAS - MED PEAK E' VEL: 5.1 CM/SEC
BH CV ECHO MEAS - MV A DUR: 0.16 SEC
BH CV ECHO MEAS - MV A MAX VEL: 110.5 CM/SEC
BH CV ECHO MEAS - MV DEC SLOPE: 227.5 CM/SEC2
BH CV ECHO MEAS - MV DEC TIME: 0.18 MSEC
BH CV ECHO MEAS - MV E MAX VEL: 87.5 CM/SEC
BH CV ECHO MEAS - MV E/A: 0.79
BH CV ECHO MEAS - MV MAX PG: 5.2 MMHG
BH CV ECHO MEAS - MV MEAN PG: 1.38 MMHG
BH CV ECHO MEAS - MV P1/2T: 108.6 MSEC
BH CV ECHO MEAS - MV V2 VTI: 34.2 CM
BH CV ECHO MEAS - MVA(P1/2T): 2.03 CM2
BH CV ECHO MEAS - MVA(VTI): 2.49 CM2
BH CV ECHO MEAS - PA ACC TIME: 0.14 SEC
BH CV ECHO MEAS - PA PR(ACCEL): 17.2 MMHG
BH CV ECHO MEAS - PA V2 MAX: 99.1 CM/SEC
BH CV ECHO MEAS - PULM A REVS DUR: 0.15 SEC
BH CV ECHO MEAS - PULM A REVS VEL: 25.2 CM/SEC
BH CV ECHO MEAS - PULM DIAS VEL: 44.9 CM/SEC
BH CV ECHO MEAS - PULM S/D: 1.53
BH CV ECHO MEAS - PULM SYS VEL: 68.7 CM/SEC
BH CV ECHO MEAS - RV MAX PG: 2.41 MMHG
BH CV ECHO MEAS - RV V1 MAX: 77.6 CM/SEC
BH CV ECHO MEAS - RV V1 VTI: 18.6 CM
BH CV ECHO MEAS - SI(MOD-SP2): 19.5 ML/M2
BH CV ECHO MEAS - SI(MOD-SP4): 30.9 ML/M2
BH CV ECHO MEAS - SV(LVOT): 85.2 ML
BH CV ECHO MEAS - SV(MOD-SP2): 43 ML
BH CV ECHO MEAS - SV(MOD-SP4): 68 ML
BH CV ECHO MEAS - TAPSE (>1.6): 0.97 CM
BH CV ECHO MEAS - TR MAX PG: 40.4 MMHG
BH CV ECHO MEAS - TR MAX VEL: 317.6 CM/SEC
BH CV ECHO MEASUREMENTS AVERAGE E/E' RATIO: 10.06
BH CV XLRA - RV BASE: 3 CM
BH CV XLRA - RV LENGTH: 6.9 CM
BH CV XLRA - RV MID: 3 CM
BH CV XLRA - TDI S': 7.5 CM/SEC
LEFT ATRIUM VOLUME INDEX: 17.8 ML/M2
MAXIMAL PREDICTED HEART RATE: 134 BPM
SINUS: 2.8 CM
STJ: 2.9 CM
STRESS TARGET HR: 114 BPM

## 2022-10-07 DIAGNOSIS — I25.10 CORONARY ARTERY DISEASE INVOLVING NATIVE CORONARY ARTERY OF NATIVE HEART WITHOUT ANGINA PECTORIS: ICD-10-CM

## 2022-10-07 RX ORDER — RANOLAZINE 500 MG/1
500 TABLET, EXTENDED RELEASE ORAL 2 TIMES DAILY
Qty: 180 TABLET | Refills: 3 | Status: SHIPPED | OUTPATIENT
Start: 2022-10-07

## 2022-10-20 ENCOUNTER — HOSPITAL ENCOUNTER (OUTPATIENT)
Facility: HOSPITAL | Age: 87
Setting detail: OBSERVATION
Discharge: HOME | End: 2022-10-21
Attending: EMERGENCY MEDICINE | Admitting: INTERNAL MEDICINE

## 2022-10-20 ENCOUNTER — APPOINTMENT (OUTPATIENT)
Dept: CARDIOLOGY | Facility: HOSPITAL | Age: 87
End: 2022-10-20

## 2022-10-20 DIAGNOSIS — L03.115 CELLULITIS OF RIGHT LOWER EXTREMITY: Primary | ICD-10-CM

## 2022-10-20 DIAGNOSIS — E11.69 TYPE 2 DIABETES MELLITUS WITH OTHER SPECIFIED COMPLICATION, UNSPECIFIED WHETHER LONG TERM INSULIN USE: ICD-10-CM

## 2022-10-20 PROBLEM — E11.9 TYPE 2 DIABETES MELLITUS, WITHOUT LONG-TERM CURRENT USE OF INSULIN (HCC): Chronic | Status: ACTIVE | Noted: 2022-10-20

## 2022-10-20 PROBLEM — I48.0 PAF (PAROXYSMAL ATRIAL FIBRILLATION) (HCC): Chronic | Status: ACTIVE | Noted: 2022-10-20

## 2022-10-20 LAB
ALBUMIN SERPL-MCNC: 4.1 G/DL (ref 3.5–5.2)
ALBUMIN/GLOB SERPL: 1.4 G/DL
ALP SERPL-CCNC: 76 U/L (ref 39–117)
ALT SERPL W P-5'-P-CCNC: 28 U/L (ref 1–41)
ANION GAP SERPL CALCULATED.3IONS-SCNC: 14.3 MMOL/L (ref 5–15)
AST SERPL-CCNC: 27 U/L (ref 1–40)
BASOPHILS # BLD AUTO: 0.07 10*3/MM3 (ref 0–0.2)
BASOPHILS NFR BLD AUTO: 0.7 % (ref 0–1.5)
BILIRUB SERPL-MCNC: 0.4 MG/DL (ref 0–1.2)
BUN SERPL-MCNC: 24 MG/DL (ref 8–23)
BUN/CREAT SERPL: 22.2 (ref 7–25)
CALCIUM SPEC-SCNC: 9.3 MG/DL (ref 8.6–10.5)
CHLORIDE SERPL-SCNC: 93 MMOL/L (ref 98–107)
CO2 SERPL-SCNC: 23.7 MMOL/L (ref 22–29)
CREAT SERPL-MCNC: 1.08 MG/DL (ref 0.76–1.27)
D-LACTATE SERPL-SCNC: 2.1 MMOL/L (ref 0.5–2)
D-LACTATE SERPL-SCNC: 2.7 MMOL/L (ref 0.5–2)
DEPRECATED RDW RBC AUTO: 43.1 FL (ref 37–54)
EGFRCR SERPLBLD CKD-EPI 2021: 66.8 ML/MIN/1.73
EOSINOPHIL # BLD AUTO: 0.11 10*3/MM3 (ref 0–0.4)
EOSINOPHIL NFR BLD AUTO: 1.1 % (ref 0.3–6.2)
ERYTHROCYTE [DISTWIDTH] IN BLOOD BY AUTOMATED COUNT: 12.1 % (ref 12.3–15.4)
GLOBULIN UR ELPH-MCNC: 3 GM/DL
GLUCOSE SERPL-MCNC: 181 MG/DL (ref 65–99)
HCT VFR BLD AUTO: 42 % (ref 37.5–51)
HGB BLD-MCNC: 14.1 G/DL (ref 13–17.7)
HOLD SPECIMEN: NORMAL
IMM GRANULOCYTES # BLD AUTO: 0.08 10*3/MM3 (ref 0–0.05)
IMM GRANULOCYTES NFR BLD AUTO: 0.8 % (ref 0–0.5)
LYMPHOCYTES # BLD AUTO: 1.29 10*3/MM3 (ref 0.7–3.1)
LYMPHOCYTES NFR BLD AUTO: 12.5 % (ref 19.6–45.3)
MCH RBC QN AUTO: 32.6 PG (ref 26.6–33)
MCHC RBC AUTO-ENTMCNC: 33.6 G/DL (ref 31.5–35.7)
MCV RBC AUTO: 97.2 FL (ref 79–97)
MONOCYTES # BLD AUTO: 1.33 10*3/MM3 (ref 0.1–0.9)
MONOCYTES NFR BLD AUTO: 12.9 % (ref 5–12)
NEUTROPHILS NFR BLD AUTO: 7.47 10*3/MM3 (ref 1.7–7)
NEUTROPHILS NFR BLD AUTO: 72 % (ref 42.7–76)
NRBC BLD AUTO-RTO: 0 /100 WBC (ref 0–0.2)
PLATELET # BLD AUTO: 234 10*3/MM3 (ref 140–450)
PMV BLD AUTO: 10.1 FL (ref 6–12)
POTASSIUM SERPL-SCNC: 4.5 MMOL/L (ref 3.5–5.2)
PROT SERPL-MCNC: 7.1 G/DL (ref 6–8.5)
RBC # BLD AUTO: 4.32 10*6/MM3 (ref 4.14–5.8)
SODIUM SERPL-SCNC: 131 MMOL/L (ref 136–145)
WBC NRBC COR # BLD: 10.35 10*3/MM3 (ref 3.4–10.8)
WHOLE BLOOD HOLD COAG: NORMAL
WHOLE BLOOD HOLD SPECIMEN: NORMAL

## 2022-10-20 PROCEDURE — 25010000002 VANCOMYCIN 10 G RECONSTITUTED SOLUTION: Performed by: PHYSICIAN ASSISTANT

## 2022-10-20 PROCEDURE — 99284 EMERGENCY DEPT VISIT MOD MDM: CPT

## 2022-10-20 PROCEDURE — 36415 COLL VENOUS BLD VENIPUNCTURE: CPT

## 2022-10-20 PROCEDURE — 96366 THER/PROPH/DIAG IV INF ADDON: CPT

## 2022-10-20 PROCEDURE — G0378 HOSPITAL OBSERVATION PER HR: HCPCS

## 2022-10-20 PROCEDURE — 85025 COMPLETE CBC W/AUTO DIFF WBC: CPT | Performed by: EMERGENCY MEDICINE

## 2022-10-20 PROCEDURE — 87641 MR-STAPH DNA AMP PROBE: CPT | Performed by: HOSPITALIST

## 2022-10-20 PROCEDURE — 25010000002 ENOXAPARIN PER 10 MG: Performed by: HOSPITALIST

## 2022-10-20 PROCEDURE — 80053 COMPREHEN METABOLIC PANEL: CPT | Performed by: EMERGENCY MEDICINE

## 2022-10-20 PROCEDURE — 96365 THER/PROPH/DIAG IV INF INIT: CPT

## 2022-10-20 PROCEDURE — 96372 THER/PROPH/DIAG INJ SC/IM: CPT

## 2022-10-20 PROCEDURE — 25010000002 PIPERACILLIN SOD-TAZOBACTAM PER 1 G: Performed by: PHYSICIAN ASSISTANT

## 2022-10-20 PROCEDURE — 93971 EXTREMITY STUDY: CPT

## 2022-10-20 PROCEDURE — 96367 TX/PROPH/DG ADDL SEQ IV INF: CPT

## 2022-10-20 PROCEDURE — 87040 BLOOD CULTURE FOR BACTERIA: CPT | Performed by: PHYSICIAN ASSISTANT

## 2022-10-20 PROCEDURE — 83605 ASSAY OF LACTIC ACID: CPT | Performed by: PHYSICIAN ASSISTANT

## 2022-10-20 RX ORDER — OXAZEPAM 15 MG/1
15 CAPSULE ORAL DAILY
Status: DISCONTINUED | OUTPATIENT
Start: 2022-10-21 | End: 2022-10-21 | Stop reason: HOSPADM

## 2022-10-20 RX ORDER — ONDANSETRON 2 MG/ML
4 INJECTION INTRAMUSCULAR; INTRAVENOUS EVERY 6 HOURS PRN
Status: DISCONTINUED | OUTPATIENT
Start: 2022-10-20 | End: 2022-10-21 | Stop reason: HOSPADM

## 2022-10-20 RX ORDER — SODIUM CHLORIDE 0.9 % (FLUSH) 0.9 %
10 SYRINGE (ML) INJECTION EVERY 12 HOURS SCHEDULED
Status: DISCONTINUED | OUTPATIENT
Start: 2022-10-20 | End: 2022-10-21 | Stop reason: HOSPADM

## 2022-10-20 RX ORDER — ATENOLOL 25 MG/1
12.5 TABLET ORAL NIGHTLY
Status: DISCONTINUED | OUTPATIENT
Start: 2022-10-21 | End: 2022-10-21 | Stop reason: HOSPADM

## 2022-10-20 RX ORDER — RANOLAZINE 500 MG/1
500 TABLET, EXTENDED RELEASE ORAL 2 TIMES DAILY
Status: DISCONTINUED | OUTPATIENT
Start: 2022-10-20 | End: 2022-10-21 | Stop reason: HOSPADM

## 2022-10-20 RX ORDER — SODIUM CHLORIDE 0.9 % (FLUSH) 0.9 %
10 SYRINGE (ML) INJECTION AS NEEDED
Status: DISCONTINUED | OUTPATIENT
Start: 2022-10-20 | End: 2022-10-21 | Stop reason: HOSPADM

## 2022-10-20 RX ORDER — ACETAMINOPHEN 325 MG/1
650 TABLET ORAL EVERY 4 HOURS PRN
Status: DISCONTINUED | OUTPATIENT
Start: 2022-10-20 | End: 2022-10-21 | Stop reason: HOSPADM

## 2022-10-20 RX ORDER — ATORVASTATIN CALCIUM 20 MG/1
20 TABLET, FILM COATED ORAL DAILY
Status: DISCONTINUED | OUTPATIENT
Start: 2022-10-21 | End: 2022-10-20

## 2022-10-20 RX ORDER — ENOXAPARIN SODIUM 100 MG/ML
40 INJECTION SUBCUTANEOUS EVERY 24 HOURS
Status: DISCONTINUED | OUTPATIENT
Start: 2022-10-20 | End: 2022-10-21 | Stop reason: HOSPADM

## 2022-10-20 RX ORDER — INSULIN LISPRO 100 [IU]/ML
0-7 INJECTION, SOLUTION INTRAVENOUS; SUBCUTANEOUS
Status: DISCONTINUED | OUTPATIENT
Start: 2022-10-21 | End: 2022-10-21 | Stop reason: HOSPADM

## 2022-10-20 RX ORDER — EMPAGLIFLOZIN 10 MG/1
1 TABLET, FILM COATED ORAL DAILY
COMMUNITY
Start: 2022-10-04

## 2022-10-20 RX ORDER — NICOTINE POLACRILEX 4 MG
15 LOZENGE BUCCAL
Status: DISCONTINUED | OUTPATIENT
Start: 2022-10-20 | End: 2022-10-21 | Stop reason: HOSPADM

## 2022-10-20 RX ORDER — ASPIRIN 81 MG/1
81 TABLET ORAL DAILY
Status: DISCONTINUED | OUTPATIENT
Start: 2022-10-21 | End: 2022-10-21 | Stop reason: HOSPADM

## 2022-10-20 RX ORDER — LISINOPRIL 10 MG/1
5 TABLET ORAL DAILY
Status: DISCONTINUED | OUTPATIENT
Start: 2022-10-21 | End: 2022-10-21 | Stop reason: HOSPADM

## 2022-10-20 RX ORDER — DEXTROSE MONOHYDRATE 25 G/50ML
25 INJECTION, SOLUTION INTRAVENOUS
Status: DISCONTINUED | OUTPATIENT
Start: 2022-10-20 | End: 2022-10-21 | Stop reason: HOSPADM

## 2022-10-20 RX ORDER — NITROGLYCERIN 0.4 MG/1
0.4 TABLET SUBLINGUAL
Status: DISCONTINUED | OUTPATIENT
Start: 2022-10-20 | End: 2022-10-21 | Stop reason: HOSPADM

## 2022-10-20 RX ORDER — CETIRIZINE HYDROCHLORIDE 10 MG/1
10 TABLET ORAL DAILY
Status: DISCONTINUED | OUTPATIENT
Start: 2022-10-21 | End: 2022-10-21 | Stop reason: HOSPADM

## 2022-10-20 RX ORDER — ATORVASTATIN CALCIUM 20 MG/1
20 TABLET, FILM COATED ORAL NIGHTLY
Status: DISCONTINUED | OUTPATIENT
Start: 2022-10-21 | End: 2022-10-21 | Stop reason: HOSPADM

## 2022-10-20 RX ORDER — PAROXETINE 10 MG/1
10 TABLET, FILM COATED ORAL EVERY MORNING
Status: DISCONTINUED | OUTPATIENT
Start: 2022-10-21 | End: 2022-10-21 | Stop reason: HOSPADM

## 2022-10-20 RX ORDER — PANTOPRAZOLE SODIUM 40 MG/1
40 TABLET, DELAYED RELEASE ORAL EVERY MORNING
Status: DISCONTINUED | OUTPATIENT
Start: 2022-10-21 | End: 2022-10-21 | Stop reason: HOSPADM

## 2022-10-20 RX ORDER — ATENOLOL 25 MG/1
12.5 TABLET ORAL DAILY
Status: DISCONTINUED | OUTPATIENT
Start: 2022-10-21 | End: 2022-10-20

## 2022-10-20 RX ADMIN — ENOXAPARIN SODIUM 40 MG: 100 INJECTION SUBCUTANEOUS at 23:28

## 2022-10-20 RX ADMIN — TAZOBACTAM SODIUM AND PIPERACILLIN SODIUM 3.38 G: 375; 3 INJECTION, SOLUTION INTRAVENOUS at 19:50

## 2022-10-20 RX ADMIN — VANCOMYCIN HYDROCHLORIDE 2000 MG: 10 INJECTION, POWDER, LYOPHILIZED, FOR SOLUTION INTRAVENOUS at 20:22

## 2022-10-20 NOTE — ED TRIAGE NOTES
Patient to ed from  with complaints of potential cellulitis to right lower leg. Patient states no fevers. Started on tuesday.

## 2022-10-20 NOTE — PROGRESS NOTES
Today's right lower venous doppler preliminary results are negative for deep vein thrombosis. The preliminary results were given to JOSE Patel.

## 2022-10-20 NOTE — ED PROVIDER NOTES
EMERGENCY DEPARTMENT ENCOUNTER    Room Number:  03/03  Date of encounter:  10/20/2022  PCP: Pal Lawrence MD  Historian: Patient, family      I used full protective equipment while examining this patient.  This includes face mask, gloves and protective eyewear.  I washed my hands before entering the room and immediately upon leaving the room      HPI:  Chief Complaint: Right leg swelling, redness  A complete HPI/ROS/PMH/PSH/SH/FH are unobtainable due to: Nothing    Context: Shaheen Saini is a 87 y.o. male who presents to the ED c/o 3-day history of gradual onset, progressively worsening right leg swelling, redness.  His family reports a mechanical fall 5 days ago.  Patient hit his right anterior lateral leg on the floor.  Patient initially had a small hematoma to this area.  Over the past several days the hematoma has resolved however the redness in the area has worsened with extension of the right leg.  He denies any fevers or chills.  He denies any chest pain, shortness of breath, hemoptysis.  He does have history of A. fib, however takes no blood thinners.  This fall did occur in [facility].  Patient did drive back and forth from there over the past several weeks.  He denies any history of DVT or PE.  He was initially seen at the [facility] center and sent here for further evaluation.    Review of Medical Records  I reviewed patient's last cardiology office visit from 9/14/2022.  Patient being treated for coronary artery disease, hypertension, diabetes.    PAST MEDICAL HISTORY  Active Ambulatory Problems     Diagnosis Date Noted   • Diabetes mellitus (HCC)    • Hyperlipidemia    • Hypertension    • GERD (gastroesophageal reflux disease)    • CAD (coronary artery disease)    • History of echocardiogram    • History of cardiac cath    • History of coronary artery bypass surgery 07/24/2019   • Mild aortic insufficiency 02/02/2021     Resolved Ambulatory Problems     Diagnosis Date Noted   •  Paroxysmal atrial fibrillation (HCC)      No Additional Past Medical History         PAST SURGICAL HISTORY  Past Surgical History:   Procedure Laterality Date   • CORONARY ARTERY BYPASS GRAFT  09/06/2005    CABG x4. Internal mammary artery to LAD, vein graft to diagonal artery, vein graft to OM, vein graft to posterior descending artery.         FAMILY HISTORY  Family History   Problem Relation Age of Onset   • Heart disease Mother          SOCIAL HISTORY  Social History     Socioeconomic History   • Marital status:    Tobacco Use   • Smoking status: Never   • Smokeless tobacco: Never   Substance and Sexual Activity   • Alcohol use: No   • Drug use: Never   • Sexual activity: Defer         ALLERGIES  Patient has no known allergies.        REVIEW OF SYSTEMS  All systems reviewed and negative except for those discussed in HPI.       PHYSICAL EXAM    I have reviewed the triage vital signs and nursing notes.    ED Triage Vitals [10/20/22 1712]   Temp Heart Rate Resp BP SpO2   97.8 °F (36.6 °C) 72 18 155/71 94 %      Temp src Heart Rate Source Patient Position BP Location FiO2 (%)   Tympanic Monitor -- -- --       Physical Exam  GENERAL: Alert, oriented, nontoxic-appearing, not distressed  HENT: head atraumatic, no nuchal rigidity  EYES: no scleral icterus, EOMI  CV: regular rhythm, regular rate, 3/6 systolic murmur  RESPIRATORY: normal effort, CTA  ABDOMEN: soft, nontender  MUSCULOSKELETAL: Medium sized area of erythema to the mid anterior right lower leg.  Mild surrounding tenderness.  Lymphangitis extending up the medial leg just proximal to the knee.  Mild diffuse calf swelling.  Compartments are soft.  Neurovascularly intact distally.  No bony tenderness.  NEURO: alert, moves all extremities, follows commands  SKIN: warm, dry        LAB RESULTS  Recent Results (from the past 24 hour(s))   Comprehensive Metabolic Panel    Collection Time: 10/20/22  7:13 PM    Specimen: Blood   Result Value Ref Range    Glucose  181 (H) 65 - 99 mg/dL    BUN 24 (H) 8 - 23 mg/dL    Creatinine 1.08 0.76 - 1.27 mg/dL    Sodium 131 (L) 136 - 145 mmol/L    Potassium 4.5 3.5 - 5.2 mmol/L    Chloride 93 (L) 98 - 107 mmol/L    CO2 23.7 22.0 - 29.0 mmol/L    Calcium 9.3 8.6 - 10.5 mg/dL    Total Protein 7.1 6.0 - 8.5 g/dL    Albumin 4.10 3.50 - 5.20 g/dL    ALT (SGPT) 28 1 - 41 U/L    AST (SGOT) 27 1 - 40 U/L    Alkaline Phosphatase 76 39 - 117 U/L    Total Bilirubin 0.4 0.0 - 1.2 mg/dL    Globulin 3.0 gm/dL    A/G Ratio 1.4 g/dL    BUN/Creatinine Ratio 22.2 7.0 - 25.0    Anion Gap 14.3 5.0 - 15.0 mmol/L    eGFR 66.8 >60.0 mL/min/1.73   Green Top (Gel)    Collection Time: 10/20/22  7:13 PM   Result Value Ref Range    Extra Tube Hold for add-ons.    Lavender Top    Collection Time: 10/20/22  7:13 PM   Result Value Ref Range    Extra Tube hold for add-on    Gold Top - SST    Collection Time: 10/20/22  7:13 PM   Result Value Ref Range    Extra Tube Hold for add-ons.    Light Blue Top    Collection Time: 10/20/22  7:13 PM   Result Value Ref Range    Extra Tube Hold for add-ons.    CBC Auto Differential    Collection Time: 10/20/22  7:13 PM    Specimen: Blood   Result Value Ref Range    WBC 10.35 3.40 - 10.80 10*3/mm3    RBC 4.32 4.14 - 5.80 10*6/mm3    Hemoglobin 14.1 13.0 - 17.7 g/dL    Hematocrit 42.0 37.5 - 51.0 %    MCV 97.2 (H) 79.0 - 97.0 fL    MCH 32.6 26.6 - 33.0 pg    MCHC 33.6 31.5 - 35.7 g/dL    RDW 12.1 (L) 12.3 - 15.4 %    RDW-SD 43.1 37.0 - 54.0 fl    MPV 10.1 6.0 - 12.0 fL    Platelets 234 140 - 450 10*3/mm3    Neutrophil % 72.0 42.7 - 76.0 %    Lymphocyte % 12.5 (L) 19.6 - 45.3 %    Monocyte % 12.9 (H) 5.0 - 12.0 %    Eosinophil % 1.1 0.3 - 6.2 %    Basophil % 0.7 0.0 - 1.5 %    Immature Grans % 0.8 (H) 0.0 - 0.5 %    Neutrophils, Absolute 7.47 (H) 1.70 - 7.00 10*3/mm3    Lymphocytes, Absolute 1.29 0.70 - 3.10 10*3/mm3    Monocytes, Absolute 1.33 (H) 0.10 - 0.90 10*3/mm3    Eosinophils, Absolute 0.11 0.00 - 0.40 10*3/mm3    Basophils,  Absolute 0.07 0.00 - 0.20 10*3/mm3    Immature Grans, Absolute 0.08 (H) 0.00 - 0.05 10*3/mm3    nRBC 0.0 0.0 - 0.2 /100 WBC   Lactic Acid, Plasma    Collection Time: 10/20/22  7:13 PM    Specimen: Blood   Result Value Ref Range    Lactate 2.1 (C) 0.5 - 2.0 mmol/L   Duplex Venous Lower Extremity - Right CAR    Collection Time: 10/20/22  7:41 PM   Result Value Ref Range    Target HR (85%) 114 bpm    Max. Pred. HR (100%) 134 bpm    Right Common Femoral Spont Y     Right Common Femoral Competent Y     Right Common Femoral Phasic Y     Right Common Femoral Compress C     Right Common Femoral Augment Y     Right Saphenofemoral Junction Compress C     Right Profunda Femoral Compress C     Right Proximal Femoral Compress C     Right Mid Femoral Spont Y     Right Mid Femoral Competent Y     Right Mid Femoral Phasic Y     Right Mid Femoral Compress C     Right Mid Femoral Augment Y     Right Distal Femoral Compress C     Right Popliteal Spont Y     Right Popliteal Competent Y     Right Popliteal Phasic Y     Right Popliteal Compress C     Right Popliteal Augment Y     Right Posterior Tibial Compress C     Right Peroneal Compress C     Right Gastronemius Compress C     Right Greater Saph AK Compress C     Right Greater Saph BK Compress C     Right Lesser Saph Compress C     Left Common Femoral Spont Y     Left Common Femoral Competent Y     Left Common Femoral Phasic Y     Left Common Femoral Compress C     Left Common Femoral Augment Y        Ordered the above labs and independently reviewed the results.      MEDICATIONS GIVEN IN ER    Medications   vancomycin 2000 mg/500 mL 0.9% NS IVPB ([facility]) (2,000 mg Intravenous New Bag 10/20/22 2022)   piperacillin-tazobactam (ZOSYN) 3.375 g in iso-osmotic dextrose 50 ml (premix) (0 g Intravenous Stopped 10/20/22 2022)         PROGRESS, DATA ANALYSIS, CONSULTS, AND MEDICAL DECISION MAKING    All labs have been independently reviewed by me.  All radiology studies have been reviewed by me  and discussed with radiologist dictating the report.   EKG's independently viewed and interpreted by me.  Discussion below represents my analysis of pertinent findings related to patient's condition, differential diagnosis, treatment plan and final disposition.    I have discussed case with Dr. Mcdonald, emergency room physician.  He has performed his own bedside examination and agrees with treatment plan.    ED Course as of 10/20/22 2125   Thu Oct 20, 2022   1918 Patient presents with 3-day history of right leg swelling, redness.  Differential diagnoses include but not limited to contusion, cellulitis, DVT.  Patient just returned from a 5-day car ride.  Plan to check basic labs, ultrasound, administer antibiotics. [EE]   1936 WBC: 10.35 [EE]   1947 I discussed venous Doppler findings with technician, Kristen.  No acute DVT seen. [EE]   2010 Patient does have cellulitis with history of diabetes.   His lactic acid is mildly elevated.  Plan to admit for IV antibiotics.  Patient in agreement with plan. [EE]   2025 Lactate(!!): 2.1 [EE]   2112 I discussed the case with Dr. Melendrez.  He agrees to admit. [EE]      ED Course User Index  [EE] Jus Kevin PA       AS OF 21:25 EDT VITALS:    BP - 162/55  HR - 62  TEMP - 97.8 °F (36.6 °C) (Tympanic)  O2 SATS - 99%        DIAGNOSIS  Final diagnoses:   Cellulitis of right lower extremity   Type 2 diabetes mellitus with other specified complication, unspecified whether long term insulin use (HCC)         DISPOSITION  Admitted      Dictated utilizing Dragon dictation     Jus Kevin PA  10/20/22 2125

## 2022-10-21 VITALS
TEMPERATURE: 98.8 F | RESPIRATION RATE: 16 BRPM | SYSTOLIC BLOOD PRESSURE: 116 MMHG | DIASTOLIC BLOOD PRESSURE: 50 MMHG | WEIGHT: 217.81 LBS | HEART RATE: 65 BPM | HEIGHT: 70 IN | BODY MASS INDEX: 31.18 KG/M2 | OXYGEN SATURATION: 97 %

## 2022-10-21 LAB
ALBUMIN SERPL-MCNC: 3.2 G/DL (ref 3.5–5.2)
ALBUMIN/GLOB SERPL: 1.3 G/DL
ALP SERPL-CCNC: 61 U/L (ref 39–117)
ALT SERPL W P-5'-P-CCNC: 19 U/L (ref 1–41)
ANION GAP SERPL CALCULATED.3IONS-SCNC: 8.8 MMOL/L (ref 5–15)
AST SERPL-CCNC: 19 U/L (ref 1–40)
BASOPHILS # BLD AUTO: 0.02 10*3/MM3 (ref 0–0.2)
BASOPHILS NFR BLD AUTO: 0.2 % (ref 0–1.5)
BH CV LOWER VASCULAR LEFT COMMON FEMORAL AUGMENT: NORMAL
BH CV LOWER VASCULAR LEFT COMMON FEMORAL COMPETENT: NORMAL
BH CV LOWER VASCULAR LEFT COMMON FEMORAL COMPRESS: NORMAL
BH CV LOWER VASCULAR LEFT COMMON FEMORAL PHASIC: NORMAL
BH CV LOWER VASCULAR LEFT COMMON FEMORAL SPONT: NORMAL
BH CV LOWER VASCULAR RIGHT COMMON FEMORAL AUGMENT: NORMAL
BH CV LOWER VASCULAR RIGHT COMMON FEMORAL COMPETENT: NORMAL
BH CV LOWER VASCULAR RIGHT COMMON FEMORAL COMPRESS: NORMAL
BH CV LOWER VASCULAR RIGHT COMMON FEMORAL PHASIC: NORMAL
BH CV LOWER VASCULAR RIGHT COMMON FEMORAL SPONT: NORMAL
BH CV LOWER VASCULAR RIGHT DISTAL FEMORAL COMPRESS: NORMAL
BH CV LOWER VASCULAR RIGHT GASTRONEMIUS COMPRESS: NORMAL
BH CV LOWER VASCULAR RIGHT GREATER SAPH AK COMPRESS: NORMAL
BH CV LOWER VASCULAR RIGHT GREATER SAPH BK COMPRESS: NORMAL
BH CV LOWER VASCULAR RIGHT LESSER SAPH COMPRESS: NORMAL
BH CV LOWER VASCULAR RIGHT MID FEMORAL AUGMENT: NORMAL
BH CV LOWER VASCULAR RIGHT MID FEMORAL COMPETENT: NORMAL
BH CV LOWER VASCULAR RIGHT MID FEMORAL COMPRESS: NORMAL
BH CV LOWER VASCULAR RIGHT MID FEMORAL PHASIC: NORMAL
BH CV LOWER VASCULAR RIGHT MID FEMORAL SPONT: NORMAL
BH CV LOWER VASCULAR RIGHT PERONEAL COMPRESS: NORMAL
BH CV LOWER VASCULAR RIGHT POPLITEAL AUGMENT: NORMAL
BH CV LOWER VASCULAR RIGHT POPLITEAL COMPETENT: NORMAL
BH CV LOWER VASCULAR RIGHT POPLITEAL COMPRESS: NORMAL
BH CV LOWER VASCULAR RIGHT POPLITEAL PHASIC: NORMAL
BH CV LOWER VASCULAR RIGHT POPLITEAL SPONT: NORMAL
BH CV LOWER VASCULAR RIGHT POSTERIOR TIBIAL COMPRESS: NORMAL
BH CV LOWER VASCULAR RIGHT PROFUNDA FEMORAL COMPRESS: NORMAL
BH CV LOWER VASCULAR RIGHT PROXIMAL FEMORAL COMPRESS: NORMAL
BH CV LOWER VASCULAR RIGHT SAPHENOFEMORAL JUNCTION COMPRESS: NORMAL
BILIRUB SERPL-MCNC: 0.7 MG/DL (ref 0–1.2)
BUN SERPL-MCNC: 21 MG/DL (ref 8–23)
BUN/CREAT SERPL: 19.6 (ref 7–25)
CALCIUM SPEC-SCNC: 8.7 MG/DL (ref 8.6–10.5)
CHLORIDE SERPL-SCNC: 97 MMOL/L (ref 98–107)
CO2 SERPL-SCNC: 22.2 MMOL/L (ref 22–29)
CREAT SERPL-MCNC: 1.07 MG/DL (ref 0.76–1.27)
D-LACTATE SERPL-SCNC: 1.2 MMOL/L (ref 0.5–2)
DEPRECATED RDW RBC AUTO: 43.4 FL (ref 37–54)
EGFRCR SERPLBLD CKD-EPI 2021: 67.6 ML/MIN/1.73
EOSINOPHIL # BLD AUTO: 0.04 10*3/MM3 (ref 0–0.4)
EOSINOPHIL NFR BLD AUTO: 0.4 % (ref 0.3–6.2)
ERYTHROCYTE [DISTWIDTH] IN BLOOD BY AUTOMATED COUNT: 12.3 % (ref 12.3–15.4)
GLOBULIN UR ELPH-MCNC: 2.4 GM/DL
GLUCOSE BLDC GLUCOMTR-MCNC: 164 MG/DL (ref 70–130)
GLUCOSE BLDC GLUCOMTR-MCNC: 200 MG/DL (ref 70–130)
GLUCOSE SERPL-MCNC: 216 MG/DL (ref 65–99)
HBA1C MFR BLD: 7 % (ref 4.8–5.6)
HCT VFR BLD AUTO: 37.4 % (ref 37.5–51)
HGB BLD-MCNC: 12.9 G/DL (ref 13–17.7)
IMM GRANULOCYTES # BLD AUTO: 0.08 10*3/MM3 (ref 0–0.05)
IMM GRANULOCYTES NFR BLD AUTO: 0.8 % (ref 0–0.5)
LYMPHOCYTES # BLD AUTO: 0.34 10*3/MM3 (ref 0.7–3.1)
LYMPHOCYTES NFR BLD AUTO: 3.3 % (ref 19.6–45.3)
MAGNESIUM SERPL-MCNC: 1.8 MG/DL (ref 1.6–2.4)
MAXIMAL PREDICTED HEART RATE: 134 BPM
MCH RBC QN AUTO: 33.2 PG (ref 26.6–33)
MCHC RBC AUTO-ENTMCNC: 34.5 G/DL (ref 31.5–35.7)
MCV RBC AUTO: 96.4 FL (ref 79–97)
MONOCYTES # BLD AUTO: 0.51 10*3/MM3 (ref 0.1–0.9)
MONOCYTES NFR BLD AUTO: 4.9 % (ref 5–12)
MRSA DNA SPEC QL NAA+PROBE: NORMAL
NEUTROPHILS NFR BLD AUTO: 9.37 10*3/MM3 (ref 1.7–7)
NEUTROPHILS NFR BLD AUTO: 90.4 % (ref 42.7–76)
NRBC BLD AUTO-RTO: 0 /100 WBC (ref 0–0.2)
PLATELET # BLD AUTO: 218 10*3/MM3 (ref 140–450)
PMV BLD AUTO: 9.8 FL (ref 6–12)
POTASSIUM SERPL-SCNC: 4.2 MMOL/L (ref 3.5–5.2)
PROT SERPL-MCNC: 5.6 G/DL (ref 6–8.5)
RBC # BLD AUTO: 3.88 10*6/MM3 (ref 4.14–5.8)
SODIUM SERPL-SCNC: 128 MMOL/L (ref 136–145)
STRESS TARGET HR: 114 BPM
WBC NRBC COR # BLD: 10.36 10*3/MM3 (ref 3.4–10.8)

## 2022-10-21 PROCEDURE — 96366 THER/PROPH/DIAG IV INF ADDON: CPT

## 2022-10-21 PROCEDURE — G0378 HOSPITAL OBSERVATION PER HR: HCPCS

## 2022-10-21 PROCEDURE — 83735 ASSAY OF MAGNESIUM: CPT | Performed by: HOSPITALIST

## 2022-10-21 PROCEDURE — 82962 GLUCOSE BLOOD TEST: CPT

## 2022-10-21 PROCEDURE — 85025 COMPLETE CBC W/AUTO DIFF WBC: CPT | Performed by: HOSPITALIST

## 2022-10-21 PROCEDURE — 25010000002 PIPERACILLIN SOD-TAZOBACTAM PER 1 G: Performed by: HOSPITALIST

## 2022-10-21 PROCEDURE — 80053 COMPREHEN METABOLIC PANEL: CPT | Performed by: HOSPITALIST

## 2022-10-21 PROCEDURE — 83605 ASSAY OF LACTIC ACID: CPT | Performed by: PHYSICIAN ASSISTANT

## 2022-10-21 PROCEDURE — 83036 HEMOGLOBIN GLYCOSYLATED A1C: CPT | Performed by: HOSPITALIST

## 2022-10-21 PROCEDURE — 63710000001 INSULIN LISPRO (HUMAN) PER 5 UNITS: Performed by: HOSPITALIST

## 2022-10-21 RX ORDER — CEPHALEXIN 500 MG/1
500 CAPSULE ORAL 3 TIMES DAILY
Qty: 30 CAPSULE | Refills: 0 | Status: SHIPPED | OUTPATIENT
Start: 2022-10-21 | End: 2022-10-31

## 2022-10-21 RX ORDER — DOXYCYCLINE 100 MG/1
100 CAPSULE ORAL EVERY 12 HOURS SCHEDULED
Qty: 20 CAPSULE | Refills: 0 | Status: SHIPPED | OUTPATIENT
Start: 2022-10-21 | End: 2022-10-31

## 2022-10-21 RX ORDER — CEPHALEXIN 500 MG/1
500 CAPSULE ORAL 3 TIMES DAILY
Status: DISCONTINUED | OUTPATIENT
Start: 2022-10-21 | End: 2022-10-21 | Stop reason: HOSPADM

## 2022-10-21 RX ORDER — DOXYCYCLINE 100 MG/1
100 CAPSULE ORAL EVERY 12 HOURS SCHEDULED
Status: DISCONTINUED | OUTPATIENT
Start: 2022-10-21 | End: 2022-10-21 | Stop reason: HOSPADM

## 2022-10-21 RX ADMIN — INSULIN LISPRO 2 UNITS: 100 INJECTION, SOLUTION INTRAVENOUS; SUBCUTANEOUS at 08:37

## 2022-10-21 RX ADMIN — ATENOLOL 12.5 MG: 25 TABLET ORAL at 00:06

## 2022-10-21 RX ADMIN — EMPAGLIFLOZIN 10 MG: 25 TABLET, FILM COATED ORAL at 08:37

## 2022-10-21 RX ADMIN — RANOLAZINE 500 MG: 500 TABLET, FILM COATED, EXTENDED RELEASE ORAL at 00:06

## 2022-10-21 RX ADMIN — RANOLAZINE 500 MG: 500 TABLET, FILM COATED, EXTENDED RELEASE ORAL at 08:37

## 2022-10-21 RX ADMIN — PAROXETINE HYDROCHLORIDE 10 MG: 10 TABLET, FILM COATED ORAL at 08:39

## 2022-10-21 RX ADMIN — Medication 10 ML: at 08:40

## 2022-10-21 RX ADMIN — Medication 10 ML: at 00:08

## 2022-10-21 RX ADMIN — TAZOBACTAM SODIUM AND PIPERACILLIN SODIUM 3.38 G: 375; 3 INJECTION, SOLUTION INTRAVENOUS at 01:53

## 2022-10-21 RX ADMIN — DOXYCYCLINE 100 MG: 100 CAPSULE ORAL at 11:21

## 2022-10-21 RX ADMIN — ASPIRIN 81 MG: 81 TABLET, FILM COATED ORAL at 08:37

## 2022-10-21 RX ADMIN — PETROLATUM 1 APPLICATION: 420 OINTMENT TOPICAL at 11:51

## 2022-10-21 RX ADMIN — LISINOPRIL 5 MG: 10 TABLET ORAL at 08:39

## 2022-10-21 RX ADMIN — PANTOPRAZOLE SODIUM 40 MG: 40 TABLET, DELAYED RELEASE ORAL at 08:38

## 2022-10-21 RX ADMIN — CETIRIZINE HYDROCHLORIDE 10 MG: 10 TABLET ORAL at 08:37

## 2022-10-21 RX ADMIN — CEPHALEXIN 500 MG: 500 CAPSULE ORAL at 11:21

## 2022-10-21 RX ADMIN — INSULIN LISPRO 3 UNITS: 100 INJECTION, SOLUTION INTRAVENOUS; SUBCUTANEOUS at 11:21

## 2022-10-21 RX ADMIN — ATORVASTATIN CALCIUM 20 MG: 20 TABLET, FILM COATED ORAL at 00:06

## 2022-10-21 NOTE — H&P
[facility]   HISTORY AND PHYSICAL    Patient Name: Shaheen Saini  : 1935  MRN: [id number]  Primary Care Physician:  Pal Lawrence MD  Date of admission: 10/20/2022    Subjective   Subjective     Chief Complaint: RLE redness, pain, and swelling    History of Present Illness  Very pleasant 86yo gentleman with h/o AFib (not on any AC), CAD, 4V CABG, DM2, HTN, and GERD, who suffered a fall in [place] 5d ago that resulted in a large hematoma anterior right lower leg. No bleeding. Hematoma resolved but pt presented to ER today c/o 3d of worsening RLE redness, pain, and swelling. Denies F/C. No SOA or CP.       Review of Systems   Constitutional: Negative for appetite change, chills, diaphoresis and fever.   HENT: Negative for nosebleeds, rhinorrhea, sore throat, trouble swallowing and voice change.    Eyes: Negative for pain and visual disturbance.   Respiratory: Negative for cough, chest tightness and shortness of breath.    Cardiovascular: Positive for leg swelling (RLE). Negative for chest pain and palpitations.   Gastrointestinal: Negative for abdominal pain, diarrhea, nausea and vomiting.   Endocrine: Negative for cold intolerance and heat intolerance.   Genitourinary: Negative for decreased urine volume, difficulty urinating, dysuria and hematuria.   Musculoskeletal: Negative for back pain and neck pain.   Skin: Positive for color change (redness of RLE). Negative for pallor and wound.   Allergic/Immunologic: Negative for environmental allergies and food allergies.   Neurological: Negative for seizures, syncope, speech difficulty and headaches.   Hematological: Negative for adenopathy. Does not bruise/bleed easily.   Psychiatric/Behavioral: Negative for behavioral problems, confusion and hallucinations.        Personal History     Past Medical History:   Diagnosis Date   • CAD (coronary artery disease)    • Diabetes mellitus (HCC)    • GERD (gastroesophageal reflux disease)    • History of  cardiac cath     7/23/12 - LVEF 65%. 1+ MV insufficiency. Three vessel CAD. Three of four bypass grafts remain patent. The vein graft to the third marginal branch is a previously documented occlusion. The distal circumflex is unchanged from angiogram 5/4/09. 5/4/09 - LVEF 65%.Three vessel CAD s/p CABG. One fraft to marginal branch is occluded, the rest of the grafts are patent. The marginal branch is diffusely dis   • History of echocardiogram     1/9/18 - LA mildly dilated. Normal LV function. Mild MR. Mild-moderate TR. Mild AR. 7/22/2012 - EF 60-65%. Moderate MI, TI, and PI. Mild AI.   • Hyperlipidemia    • Hypertension    • Paroxysmal atrial fibrillation (HCC)        Past Surgical History:   Procedure Laterality Date   • CORONARY ARTERY BYPASS GRAFT  09/06/2005    CABG x4. Internal mammary artery to LAD, vein graft to diagonal artery, vein graft to OM, vein graft to posterior descending artery.       Family History: family history includes Heart disease in his mother. Otherwise pertinent FHx was reviewed and not pertinent to current issue.    Social History:  reports that he has never smoked. He has never used smokeless tobacco. He reports that he does not drink alcohol and does not use drugs.    Home Medications:  Accu-Chek Guide, Accu-Chek Softclix Lancets, PARoxetine, Zinc, aspirin, atenolol, atorvastatin, cetirizine, esomeprazole, glucose blood, lisinopril, metFORMIN, nitroglycerin, oxazepam, ranolazine, and vitamin C    Allergies:  No Known Allergies    Objective    Objective     Vitals:   Temp:  [97.8 °F (36.6 °C)] 97.8 °F (36.6 °C)  Heart Rate:  [62-72] 62  Resp:  [16-18] 16  BP: (155-162)/(55-71) 162/55    Physical Exam  Vitals and nursing note reviewed.   Constitutional:       General: He is not in acute distress.     Appearance: He is ill-appearing (chronically). He is not toxic-appearing or diaphoretic.   HENT:      Head: Normocephalic and atraumatic.      Right Ear: External ear normal.      Left Ear:  External ear normal.      Nose: Nose normal.      Mouth/Throat:      Mouth: Mucous membranes are moist.      Pharynx: Oropharynx is clear.   Eyes:      General: No scleral icterus.        Right eye: No discharge.         Left eye: No discharge.      Extraocular Movements: Extraocular movements intact.      Conjunctiva/sclera: Conjunctivae normal.      Pupils: Pupils are equal, round, and reactive to light.   Cardiovascular:      Rate and Rhythm: Normal rate and regular rhythm.      Pulses: Normal pulses.      Heart sounds: Murmur heard.   Pulmonary:      Effort: Pulmonary effort is normal. No respiratory distress.      Breath sounds: Normal breath sounds. No wheezing or rales.   Abdominal:      General: Bowel sounds are normal. There is no distension.      Palpations: Abdomen is soft.      Tenderness: There is no abdominal tenderness.   Musculoskeletal:         General: Swelling (RLE) present. No deformity. Normal range of motion.      Cervical back: Neck supple. No rigidity.   Lymphadenopathy:      Cervical: No cervical adenopathy.   Skin:     General: Skin is warm and dry.      Capillary Refill: Capillary refill takes less than 2 seconds.      Coloration: Skin is not jaundiced.      Comments: Erythema of RLE, mainly anterior lower leg but streaking up medial aspect to thigh, area outlined with marker  Areas are warm, indurated, and TTP  No vesicles, no fluctuance   Neurological:      General: No focal deficit present.      Mental Status: He is alert and oriented to person, place, and time. Mental status is at baseline.      Cranial Nerves: No cranial nerve deficit.      Coordination: Coordination normal.   Psychiatric:         Mood and Affect: Mood normal.         Behavior: Behavior normal.         Thought Content: Thought content normal.      Comments: Very pleasant         Result Review    Result Review:  I have personally reviewed the results from the time of this admission to 10/20/2022 22:17 EDT and agree  with these findings:  [x]  Laboratory list / accordion  [x]  Microbiology  []  Radiology  [x]  EKG/Telemetry   [x]  Cardiology/Vascular   []  Pathology  [x]  Old records  []  Other:  Most notable findings include: WBC 10.35, LA, 2.1, Na+ 131, U/S neg for DVT      Assessment & Plan   Assessment / Plan     Brief Patient Summary:  Shaheen Saini is a 87 y.o. male who presents to ER with RLE cellulitis/lymphangitis.    Active Hospital Problems:  Active Hospital Problems    Diagnosis    • **Cellulitis of right lower extremity    • Type 2 diabetes mellitus, without long-term current use of insulin ([facility])    • PAF (paroxysmal atrial fibrillation) ([facility])    • Mild aortic insufficiency    • History of coronary artery bypass surgery      9/6/2005-left internal mammary graft to the LAD, saphenous vein graft to the diagonal branch, obtuse marginal branch of the circumflex and posterior descending branch of the right coronary artery.     • Hypertension    • CAD (coronary artery disease)    • GERD (gastroesophageal reflux disease)      Plan:   No evidence of sepsis or DVT  Continue Zosyn/Vanc  Check blood cultures and MRSA screen  Check A1c and continue his home Jardiance with SSI coverage as well, d/w pt and he no longer takes metformin or sulfonylurea  No cardiac symptoms at present  Further orders to follow as suggested by evolving hospital course    D/w pt and Jus GARCIA       DVT prophylaxis:  Lovenox    CODE STATUS:     Full code    Admission Status:  I believe this patient meets observation status.    Daad Melendrez MD

## 2022-10-21 NOTE — ED PROVIDER NOTES
MD ATTESTATION NOTE    The TESS and I have discussed this patient's history, physical exam, and treatment plan.  I have reviewed the documentation and personally had a face to face interaction with the patient. I affirm the documentation and agree with the treatment and plan.  The attached note describes my personal findings.      I provided a substantive portion of the care of the patient.  I personally performed the physical exam in its entirety, and below are my findings.  For this patient encounter, the patient wore surgical mask, I wore full protective PPE including N95 and eye protection.      Brief HPI: Patient complains of right leg swelling, redness, and pain for the past few days.  Patient fell 5 days ago and hit his right leg on the ground.  Denies fever, chills, chest pain, shortness of breath.  Patient is a diabetic.    PHYSICAL EXAM  ED Triage Vitals   Temp Heart Rate Resp BP SpO2   10/20/22 1712 10/20/22 1712 10/20/22 1712 10/20/22 1712 10/20/22 1712   97.8 °F (36.6 °C) 72 18 155/71 94 %      Temp src Heart Rate Source Patient Position BP Location FiO2 (%)   10/20/22 1712 10/20/22 1712 10/20/22 1951 10/20/22 1951 --   Tympanic Monitor Sitting Right arm          GENERAL: Awake, alert, oriented x3.  Well-developed, well-nourished and nontoxic-appearing elderly male.  No acute distress  HENT: nares patent  EYES: no scleral icterus  CV: regular rhythm, normal rate  RESPIRATORY: normal effort, clear to auscultation bilaterally  ABDOMEN: soft, nontender  MUSCULOSKELETAL: There is soft tissue tenderness over the right lower leg.  NEURO: alert, moves all extremities, follows commands  PSYCH:  calm, cooperative  SKIN: There is an area of warmth erythema on the anterior aspect of the right leg consistent with cellulitis.  No lymphangitis    Vital signs and nursing notes reviewed.        Plan: Obtain labs and ultrasound of the right leg    White blood cell count is normal.  Lactic acid is mildly elevated.   Doppler was negative for DVT.  Patient will be started on IV antibiotics and admitted for cellulitis.     Surinder Mcdonald MD  10/20/22 2021

## 2022-10-21 NOTE — CASE MANAGEMENT/SOCIAL WORK
Discharge Planning Assessment  [facility]     Patient Name: Shaheen Saini  MRN: [id number]  Today's Date: 10/21/2022    Admit Date: 10/20/2022    Plan: Introduced self and explained role of CCP. PPE used. Info on facesheet verified   Discharge Needs Assessment     Row Name 10/21/22 0912       Living Environment    People in Home spouse;child(wei), adult    Name(s) of People in Home spouse Flor    Current Living Arrangements home    Primary Care Provided by self    Provides Primary Care For no one    Family Caregiver if Needed spouse    Family Caregiver Names Flor    Quality of Family Relationships supportive    Able to Return to Prior Arrangements yes       Resource/Environmental Concerns    Resource/Environmental Concerns none       Transition Planning    Patient/Family Anticipates Transition to home with family    Patient/Family Anticipated Services at Transition none    Transportation Anticipated family or friend will provide       Discharge Needs Assessment    Equipment Currently Used at Home none    Concerns to be Addressed no discharge needs identified    Anticipated Changes Related to Illness none    Equipment Needed After Discharge none    Provided Post Acute Provider List? N/A    Provided Post Acute Provider Quality & Resource List? N/A               Discharge Plan     Row Name 10/21/22 0913       Plan    Plan Introduced self and explained role of CCP. PPE used. Info on facesheet verified    Provided Post Acute Provider List? N/A    Provided Post Acute Provider Quality & Resource List? N/A    Plan Comments Lives at home w/ wife and adult daughter and plans to return at d/c, w/ family to transport. Independent w/ ADLs. No assistive devices used. Denies any d/c needs or financial concerns    Final Discharge Disposition Code 01 - home or self-care              Continued Care and Services - Admitted Since 10/20/2022    Coordination has not been started for this encounter.       Expected Discharge Date  and Time     Expected Discharge Date Expected Discharge Time    Oct 21, 2022          Demographic Summary    No documentation.                Functional Status    No documentation.                Psychosocial    No documentation.                Abuse/Neglect    No documentation.                Legal    No documentation.                Substance Abuse    No documentation.                Patient Forms    No documentation.                   Cathy Hancock RN

## 2022-10-21 NOTE — PROGRESS NOTES
"[facility] Clinical Pharmacy Services: Vancomycin and Zosyn Pharmacokinetic Initial Consult Note    Shaheen Saini is a 87 y.o. male who is on day 1/5 of pharmacy to dose vancomycin and zosyn    Indication: Skin and Soft Tissue  Consulting Provider: Dr. Melendrez  Planned Duration of Therapy: 5 days  Loading Dose Ordered or Given: 2000 mg on 10/20 at 2022  Culture/Source: Blood culture 2/2 sets in process, MRSA screen in process but really only applicable to pneumonia.   Target: -600 mg/L.hr   Other Antimicrobials: Zosyn 3.375 gram IV q8h extended infusion protocol initiated.     Vitals/Labs  Ht: 177.8 cm (70\"); Wt: 97.5 kg (215 lb)  Temp Readings from Last 1 Encounters:   10/20/22 97.5 °F (36.4 °C) (Oral)    Estimated Creatinine Clearance: 57.5 mL/min (by C-G formula based on SCr of 1.08 mg/dL).  renal function stable at this time; at baseline     Results from last 7 days   Lab Units 10/20/22  1913   CREATININE mg/dL 1.08   WBC 10*3/mm3 10.35     Assessment/Plan:    1. Vancomycin Dose:   750 mg IV every  12  hours  2. Predictive AUC level for the dose ordered is 522 mg/L.hr, which is within the target of 400-600 mg/L.hr  3. Vanc Random has been ordered for 10/21/22 at 2000 prior to the third overall dose of vancomycin  4. Zosyn 3.375 gram IV q8h extended infusion protocol ordered. Received first dose of Zosyn in the ED over 30 minutes, next dose to begin at 0200 on 10/21/22 and to be administered over 4 hours to optimize T>SARA     Pharmacy will follow patient's kidney function and will adjust doses and obtain levels as necessary. Thank you for involving pharmacy in this patient's care. Please contact pharmacy with any questions or concerns.           Argelia Hernandez, PharmD  Emergency Medicine Clinical Pharmacist   Phone: [phone]      "

## 2022-10-21 NOTE — PLAN OF CARE
Goal Outcome Evaluation:   Pt admitted from ED for RLE cellulitis s/p fall x 5 days ago. Area marked for tracking purposes. Tolerating IV antibiotics as ordered. Fall precautions implemented and maintained. Pt noted to be in Afib on continuous cardiac monitor. Pt with prior history of afib.

## 2022-10-21 NOTE — DISCHARGE SUMMARY
NAME: Shaheen Saini ADMIT: 10/20/2022   : 1935  PCP: Pal Lawrence MD    MRN: [id number] LOS: 0 days   AGE/SEX: 87 y.o. male  ROOM: [facility]/     Date of Admission:  10/20/2022  Date of Discharge:  10/21/2022    PCP: Pal Lawrence MD    CHIEF COMPLAINT  Leg Pain      DISCHARGE DIAGNOSIS  Active Hospital Problems    Diagnosis  POA   • **Cellulitis of right lower extremity [L03.115]  Yes   • Type 2 diabetes mellitus, without long-term current use of insulin (HCC) [E11.9]  Yes   • PAF (paroxysmal atrial fibrillation) (HCC) [I48.0]  Yes   • Mild aortic insufficiency [I35.1]  Yes   • History of coronary artery bypass surgery [Z95.1]  Not Applicable   • Hypertension [I10]  Yes   • CAD (coronary artery disease) [I25.10]  Yes   • GERD (gastroesophageal reflux disease) [K21.9]  Yes      Resolved Hospital Problems   No resolved problems to display.       SECONDARY DIAGNOSES  Past Medical History:   Diagnosis Date   • CAD (coronary artery disease)    • Diabetes mellitus (HCC)    • GERD (gastroesophageal reflux disease)    • History of cardiac cath     12 - LVEF 65%. 1+ MV insufficiency. Three vessel CAD. Three of four bypass grafts remain patent. The vein graft to the third marginal branch is a previously documented occlusion. The distal circumflex is unchanged from angiogram 09. 09 - LVEF 65%.Three vessel CAD s/p CABG. One fraft to marginal branch is occluded, the rest of the grafts are patent. The marginal branch is diffusely dis   • History of echocardiogram     18 - LA mildly dilated. Normal LV function. Mild MR. Mild-moderate TR. Mild AR. 2012 - EF 60-65%. Moderate MI, TI, and PI. Mild AI.   • Hyperlipidemia    • Hypertension    • Paroxysmal atrial fibrillation (HCC)            HOSPITAL COURSE     Very pleasant 86yo gentleman with h/o AFib (not on any AC), CAD, 4V CABG, DM2, HTN, and GERD, who suffered a fall in [place] 5d ago that resulted in a large hematoma anterior  right lower leg. No bleeding. Hematoma resolved but pt presented to ER today c/o 3d of worsening RLE redness, pain, and swelling. Denies F/C     He was given IV Vanc and zosyn.  He had improvement of his symptoms.  He feels much better and wishes for discharge.  I did offer him another day or 2 stay in the hospital for IV antibiotics but he prefers to go home.  I do think this is reasonable as the IV antibiotics have increased risk of toxicity as well as risk of picking up infection or other complication from being in the hospital.  I will give him 10 days of oral Keflex and doxycycline and he will follow-up as an outpatient.  I discussed with him return for medical evaluation if having fever or worsening of symptoms. Recommend Vaseline to help dry skin as well.       DIAGNOSTICS       10/21/2022 0408 10/21/2022 0447 Comprehensive Metabolic Panel [[id number]]    (Abnormal)   Blood    Final result Component Value Units   Glucose 216 High  mg/dL   BUN 21 mg/dL   Creatinine 1.07 mg/dL   Sodium 128 Low  mmol/L   Potassium 4.2 mmol/L   Chloride 97 Low  mmol/L   CO2 22.2 mmol/L   Calcium 8.7 mg/dL   Total Protein 5.6 Low  g/dL   Albumin 3.20 Low  g/dL   ALT (SGPT) 19 U/L   AST (SGOT) 19 U/L   Alkaline Phosphatase 61 U/L   Total Bilirubin 0.7 mg/dL   Globulin 2.4 gm/dL   A/G Ratio 1.3 g/dL   BUN/Creatinine Ratio 19.6    Anion Gap 8.8 mmol/L   eGFR 67.6  mL/min/1.73          10/21/2022 0408 10/21/2022 0447 Magnesium [[id number]]   Blood    Final result Component Value Units   Magnesium 1.8 mg/dL          10/21/2022 0408 10/21/2022 0427 CBC Auto Differential [[id number]]   (Abnormal)   Blood    Final result Component Value Units   WBC 10.36 10*3/mm3   RBC 3.88 Low  10*6/mm3   Hemoglobin 12.9 Low  g/dL   Hematocrit 37.4 Low  %   MCV 96.4 fL   MCH 33.2 High  pg   MCHC 34.5 g/dL   RDW 12.3 %   RDW-SD 43.4 fl   MPV 9.8 fL   Platelets 218 10*3/mm3   Neutrophil % 90.4 High  %   Lymphocyte % 3.3 Low  %   Monocyte % 4.9 Low  %    Eosinophil % 0.4 %   Basophil % 0.2 %   Immature Grans % 0.8 High  %   Neutrophils, Absolute 9.37 High  10*3/mm3   Lymphocytes, Absolute 0.34 Low  10*3/mm3   Monocytes, Absolute 0.51 10*3/mm3   Eosinophils, Absolute 0.04 10*3/mm3   Basophils, Absolute 0.02 10*3/mm3   Immature Grans, Absolute 0.08 High  10*3/mm3   nRBC 0.0 /100 WBC          10/21/2022 0408 10/21/2022 0438 Hemoglobin A1c [[id number]]    (Abnormal)   Blood    Final result Component Value Units   Hemoglobin A1C 7.00 High              PHYSICAL EXAM  Objective    Alert  nad  No resp distress  erythema to RLE- he states improved    CONDITION ON DISCHARGE  Stable.      DISCHARGE DISPOSITION   Home or Self Care      DISCHARGE MEDICATIONS       Your medication list      START taking these medications      Instructions Last Dose Given Next Dose Due   cephalexin 500 MG capsule  Commonly known as: KEFLEX      Take 1 capsule by mouth 3 (Three) Times a Day for 30 doses. Indications: Infection of the Skin and/or Soft Tissue       doxycycline 100 MG capsule  Commonly known as: MONODOX      Take 1 capsule by mouth Every 12 (Twelve) Hours for 20 doses. Indications: Infection of the Skin and/or Soft Tissue       PETROLATUM 42 % EX OINT WRAPPER      Apply 1 application topically to the appropriate area as directed Every 12 (Twelve) Hours.          CONTINUE taking these medications      Instructions Last Dose Given Next Dose Due   Accu-Chek Guide w/Device kit      1 Device Daily.       Accu-Chek Softclix Lancets lancets      1 each by Other route Daily.       aspirin 81 MG EC tablet      Take 81 mg by mouth Daily.       atenolol 25 MG tablet  Commonly known as: TENORMIN      Take 12.5 mg by mouth Daily.       atorvastatin 20 MG tablet  Commonly known as: LIPITOR      Take 20 mg by mouth Daily.       cetirizine 10 MG tablet  Commonly known as: zyrTEC      Take 10 mg by mouth Daily.       esomeprazole 20 MG capsule  Commonly known as: nexIUM      Take 20 mg by mouth Every  Morning Before Breakfast.       glucose blood test strip      1 strip by Other route Daily.       Jardiance 10 MG tablet tablet  Generic drug: empagliflozin      Take 1 tablet by mouth Daily.       lisinopril 5 MG tablet  Commonly known as: PRINIVIL,ZESTRIL      Take 5 mg by mouth Daily.       nitroglycerin 0.4 MG SL tablet  Commonly known as: NITROSTAT      Place 0.4 mg under the tongue Every 5 (Five) Minutes As Needed for Chest Pain. Take no more than 3 doses in 15 minutes.       oxazepam 15 MG capsule  Commonly known as: SERAX      Take 15 mg by mouth Daily.       PARoxetine 10 MG tablet  Commonly known as: PAXIL      Take 10 mg by mouth Every Morning.       ranolazine 500 MG 12 hr tablet  Commonly known as: RANEXA      Take 1 tablet by mouth 2 (Two) Times a Day.       vitamin C 500 MG tablet  Commonly known as: ASCORBIC ACID      Take 500 mg by mouth.       Zinc 50 MG capsule      Take 1 capsule by mouth Daily.             Where to Get Your Medications      These medications were sent to [facility] [id number] - [facility] [address] AT [facility] [phone] [facility] [phone]   [address] [place] [address], [place] [id number]    Phone: [phone]   · cephalexin 500 MG capsule  · doxycycline 100 MG capsule     Information about where to get these medications is not yet available    Ask your nurse or doctor about these medications  · PETROLATUM 42 % EX OINT WRAPPER          Future Appointments   Date Time Provider Department Center   3/15/2023 10:30 AM Tommy Ellis MD [facility]      Follow-up Information     Pal Lawrence MD .    Specialty: Family Medicine  Contact information:  [address] [id number] [phone]                         TEST  RESULTS PENDING AT DISCHARGE  Pending Labs     Order Current Status    Blood Culture - Blood, Arm, Right In process    Blood Culture - Blood, Arm, Right In process             Davy Robles  MD [place]  [facility]  10/21/22  08:39 EDT      Time: greater than 32 minutes on discharge  It was a pleasure taking care of this patient while in the hospital.

## 2022-10-22 ENCOUNTER — NURSE TRIAGE (OUTPATIENT)
Dept: CALL CENTER | Facility: HOSPITAL | Age: 87
End: 2022-10-22

## 2022-10-22 NOTE — TELEPHONE ENCOUNTER
"    Reason for Disposition  • [1] Taking antibiotic < 24 hours AND [2] cellulitis symptoms are WORSE (e.g., spreading redness, pain, swelling, drainage) AND [3] no fever    Additional Information  • Negative: Shock suspected (e.g., cold/pale/clammy skin, too weak to stand, low BP, rapid pulse)  • Negative: Sounds like a life-threatening emergency to the triager  • Negative:  surgical wound infection suspected (post-op)  • Negative: Surgical wound infection suspected (post-op)  • Negative: [1] Widespread rash AND [2] drug rash suspected (i.e., allergic reaction to antibiotic)  • Negative: Animal bite wound infection suspected  • Negative: SEVERE pain  • Negative: [1] SEVERE pain with bending of finger (or toe) AND [2] cellulitis on hand (or foot)  • Negative: Fever > 104 F (40 C)  • Negative: [1] Widespread rash AND [2] bright red, sunburn-like  • Negative: Black (necrotic), dark purple, or blisters develop in area of cellulitis  • Negative: Patient sounds very sick or weak to the triager  • Negative: [1] Taking antibiotic > 24 hours AND [2] fever persists  • Negative: [1] Taking antibiotic > 24 hours AND [2] red streak (or line) runs from area of infection  • Negative: [1] Fever > 100.0 F (37.8 C) AND [2] new-onset  • Negative: [1] Red streak runs from area of infection AND [2] new-onset  • Negative: [1] Caller has URGENT question AND [2] triager unable to answer question  • Negative: [1] Taking antibiotic > 24 hours AND [2] cellulitis symptoms are WORSE (e.g., spreading redness, pain, swelling)  • Negative: [1] Finished taking antibiotic AND [2] cellulitis symptoms are WORSE (e.g., redness, pain  drainage, swelling)  • Negative: [1] Red streak (or line) runs from area of infection AND [2] longer than 4 inches (10 cm)    Answer Assessment - Initial Assessment Questions  1. SYMPTOM: \"What's the main symptom you're concerned about?\" (e.g., redness, swelling, pain, fever, weakness)      Rt leg cellulitis. " "Received vancomycin and zosyn in ER yesterday and sent home with doxycycline and keflex.  Yesterday the skin was intact but red.  [place] drawn over the area.  Has not spread beyond that. Vaseline gauze was put on and bandaged.  Today removed it and skin has begun to leak pus. They are not aware of him having any fever.  States he feels fine except the leg does hurt when he walks.    2. CELLULITIS LOCATION: \"Where is the cellulitis located?\" (e.g., hand, arm, foot, leg, face)      righ leg  3. CELLULITIS SIZE: \"What is the size of the red area?\" (e.g., inches, centimeters; compare to size of a coin) .      Ankle to knee  4. BETTER-SAME-WORSE: \"Are you getting better, staying the same, or getting worse compared to the day you started the antibiotics?\"       Redness has not increased, pain is better but today is draining pus  5. PAIN: Do you have any pain?\"  If Yes, ask: \"How bad is the pain?\"  (e.g., Scale 1-10; mild, moderate, or severe)     - MILD (1-3): doesn't interfere with normal activities      - MODERATE (4-7): interferes with normal activities or awakens from sleep     - SEVERE (8-10): excruciating pain, unable to do any normal activities        3  6. FEVER: \"Do you have a fever?\" If Yes, ask: \"What is it, how was it measured and when did it start?\"      Will check it every 4 hours and record, advised to go back to ER if  > 100.4  7. OTHER SYMPTOMS: \"Do you have any other symptoms?\" (e.g., pus coming from a wound, red streaks, weakness)      Pus from wound, no red streaks  8. DIAGNOSIS DATE: \"When was the cellulitis diagnosed?\" \"By whom?\"       10/21 ER  9. ANTIBIOTIC NAME: \"What antibiotic(s) are you taking?\"  \"How many times per day?\" (Be sure the patient is receiving the antibiotic as directed).       Doxycycline and keflex oral in ER received Vancomycin and zosyn  10. ANTIBIOTIC DATE: \"When was the antibiotic started?\"        10/21/22  11. FOLLOW-UP APPOINTMENT: \"Do you have follow-up appointment with " "your doctor?\"        To call Monday  10/24 to PCP for appt    Protocols used: CELLULITIS ON ANTIBIOTIC FOLLOW-UP CALL-ADULT-      "

## 2022-10-25 LAB
BACTERIA SPEC AEROBE CULT: NORMAL
BACTERIA SPEC AEROBE CULT: NORMAL

## 2024-08-17 ENCOUNTER — HOSPITAL ENCOUNTER (INPATIENT)
Facility: HOSPITAL | Age: 89
LOS: 4 days | Discharge: HOME HEALTH | DRG: 417 | End: 2024-08-21
Attending: EMERGENCY MEDICINE | Admitting: STUDENT IN AN ORGANIZED HEALTH CARE EDUCATION/TRAINING PROGRAM
Payer: MEDICARE

## 2024-08-17 ENCOUNTER — APPOINTMENT (OUTPATIENT)
Dept: GENERAL RADIOLOGY | Facility: HOSPITAL | Age: 89
DRG: 417 | End: 2024-08-17
Payer: MEDICARE

## 2024-08-17 ENCOUNTER — APPOINTMENT (OUTPATIENT)
Dept: CARDIOLOGY | Facility: HOSPITAL | Age: 89
DRG: 417 | End: 2024-08-17
Payer: MEDICARE

## 2024-08-17 ENCOUNTER — APPOINTMENT (OUTPATIENT)
Dept: CT IMAGING | Facility: HOSPITAL | Age: 89
DRG: 417 | End: 2024-08-17
Payer: MEDICARE

## 2024-08-17 DIAGNOSIS — N17.9 AKI (ACUTE KIDNEY INJURY): ICD-10-CM

## 2024-08-17 DIAGNOSIS — K81.0 ACUTE CHOLECYSTITIS: Primary | ICD-10-CM

## 2024-08-17 DIAGNOSIS — E87.1 HYPONATREMIA: ICD-10-CM

## 2024-08-17 PROBLEM — D72.829 LEUKOCYTOSIS: Status: ACTIVE | Noted: 2024-08-17

## 2024-08-17 LAB
ALBUMIN SERPL-MCNC: 3.6 G/DL (ref 3.5–5.2)
ALBUMIN/GLOB SERPL: 1.2 G/DL
ALP SERPL-CCNC: 80 U/L (ref 39–117)
ALT SERPL W P-5'-P-CCNC: 47 U/L (ref 1–41)
ANION GAP SERPL CALCULATED.3IONS-SCNC: 12 MMOL/L (ref 5–15)
ANION GAP SERPL CALCULATED.3IONS-SCNC: 13 MMOL/L (ref 5–15)
AORTIC DIMENSIONLESS INDEX: 0.5 (DI)
AST SERPL-CCNC: 64 U/L (ref 1–40)
B PARAPERT DNA SPEC QL NAA+PROBE: NOT DETECTED
B PERT DNA SPEC QL NAA+PROBE: NOT DETECTED
BASOPHILS # BLD AUTO: 0.02 10*3/MM3 (ref 0–0.2)
BASOPHILS NFR BLD AUTO: 0.1 % (ref 0–1.5)
BH CV ECHO MEAS - AI P1/2T: 342.4 MSEC
BH CV ECHO MEAS - AO MAX PG: 23.2 MMHG
BH CV ECHO MEAS - AO MEAN PG: 13 MMHG
BH CV ECHO MEAS - AO V2 MAX: 241 CM/SEC
BH CV ECHO MEAS - AO V2 VTI: 48.3 CM
BH CV ECHO MEAS - AVA(I,D): 1.74 CM2
BH CV ECHO MEAS - EDV(CUBED): 105.6 ML
BH CV ECHO MEAS - EDV(MOD-SP2): 74 ML
BH CV ECHO MEAS - EDV(MOD-SP4): 107 ML
BH CV ECHO MEAS - EF(MOD-BP): 65.4 %
BH CV ECHO MEAS - EF(MOD-SP2): 60.8 %
BH CV ECHO MEAS - EF(MOD-SP4): 67.3 %
BH CV ECHO MEAS - ESV(CUBED): 38.5 ML
BH CV ECHO MEAS - ESV(MOD-SP2): 29 ML
BH CV ECHO MEAS - ESV(MOD-SP4): 35 ML
BH CV ECHO MEAS - FS: 28.6 %
BH CV ECHO MEAS - IVS/LVPW: 1.35 CM
BH CV ECHO MEAS - IVSD: 1.01 CM
BH CV ECHO MEAS - LAT PEAK E' VEL: 16.3 CM/SEC
BH CV ECHO MEAS - LV DIASTOLIC VOL/BSA (35-75): 51.1 CM2
BH CV ECHO MEAS - LV MASS(C)D: 140.1 GRAMS
BH CV ECHO MEAS - LV MAX PG: 5.7 MMHG
BH CV ECHO MEAS - LV MEAN PG: 3 MMHG
BH CV ECHO MEAS - LV SYSTOLIC VOL/BSA (12-30): 16.7 CM2
BH CV ECHO MEAS - LV V1 MAX: 119 CM/SEC
BH CV ECHO MEAS - LV V1 VTI: 22.1 CM
BH CV ECHO MEAS - LVIDD: 4.7 CM
BH CV ECHO MEAS - LVIDS: 3.4 CM
BH CV ECHO MEAS - LVOT AREA: 3.8 CM2
BH CV ECHO MEAS - LVOT DIAM: 2.2 CM
BH CV ECHO MEAS - LVPWD: 0.75 CM
BH CV ECHO MEAS - MED PEAK E' VEL: 6.5 CM/SEC
BH CV ECHO MEAS - MV A MAX VEL: 114 CM/SEC
BH CV ECHO MEAS - MV DEC SLOPE: 455.4 CM/SEC2
BH CV ECHO MEAS - MV DEC TIME: 0.26 SEC
BH CV ECHO MEAS - MV E MAX VEL: 85.3 CM/SEC
BH CV ECHO MEAS - MV E/A: 0.75
BH CV ECHO MEAS - MV MAX PG: 8 MMHG
BH CV ECHO MEAS - MV MEAN PG: 2.24 MMHG
BH CV ECHO MEAS - MV P1/2T: 67.7 MSEC
BH CV ECHO MEAS - MV V2 VTI: 34 CM
BH CV ECHO MEAS - MVA(P1/2T): 3.2 CM2
BH CV ECHO MEAS - MVA(VTI): 2.47 CM2
BH CV ECHO MEAS - PA ACC TIME: 0.08 SEC
BH CV ECHO MEAS - PA V2 MAX: 96.4 CM/SEC
BH CV ECHO MEAS - QP/QS: 0.46
BH CV ECHO MEAS - RAP SYSTOLE: 3 MMHG
BH CV ECHO MEAS - RV MAX PG: 1.98 MMHG
BH CV ECHO MEAS - RV V1 MAX: 70.3 CM/SEC
BH CV ECHO MEAS - RV V1 VTI: 13.9 CM
BH CV ECHO MEAS - RVOT DIAM: 1.89 CM
BH CV ECHO MEAS - RVSP: 41 MMHG
BH CV ECHO MEAS - SV(LVOT): 84 ML
BH CV ECHO MEAS - SV(MOD-SP2): 45 ML
BH CV ECHO MEAS - SV(MOD-SP4): 72 ML
BH CV ECHO MEAS - SV(RVOT): 39 ML
BH CV ECHO MEAS - SVI(LVOT): 40.1 ML/M2
BH CV ECHO MEAS - SVI(MOD-SP2): 21.5 ML/M2
BH CV ECHO MEAS - SVI(MOD-SP4): 34.4 ML/M2
BH CV ECHO MEAS - TR MAX PG: 38.6 MMHG
BH CV ECHO MEAS - TR MAX VEL: 310.7 CM/SEC
BH CV ECHO MEASUREMENTS AVERAGE E/E' RATIO: 7.48
BH CV XLRA - RV BASE: 2.7 CM
BH CV XLRA - RV LENGTH: 6.4 CM
BH CV XLRA - TDI S': 6.4 CM/SEC
BILIRUB SERPL-MCNC: 0.9 MG/DL (ref 0–1.2)
BUN SERPL-MCNC: 33 MG/DL (ref 8–23)
BUN SERPL-MCNC: 34 MG/DL (ref 8–23)
BUN/CREAT SERPL: 21.3 (ref 7–25)
BUN/CREAT SERPL: 21.9 (ref 7–25)
C PNEUM DNA NPH QL NAA+NON-PROBE: NOT DETECTED
CALCIUM SPEC-SCNC: 8.9 MG/DL (ref 8.6–10.5)
CALCIUM SPEC-SCNC: 9.1 MG/DL (ref 8.6–10.5)
CHLORIDE SERPL-SCNC: 94 MMOL/L (ref 98–107)
CHLORIDE SERPL-SCNC: 96 MMOL/L (ref 98–107)
CO2 SERPL-SCNC: 20 MMOL/L (ref 22–29)
CO2 SERPL-SCNC: 22 MMOL/L (ref 22–29)
CREAT SERPL-MCNC: 1.51 MG/DL (ref 0.76–1.27)
CREAT SERPL-MCNC: 1.6 MG/DL (ref 0.76–1.27)
D-LACTATE SERPL-SCNC: 1.9 MMOL/L (ref 0.5–2)
DEPRECATED RDW RBC AUTO: 42.7 FL (ref 37–54)
EGFRCR SERPLBLD CKD-EPI 2021: 41.2 ML/MIN/1.73
EGFRCR SERPLBLD CKD-EPI 2021: 44.1 ML/MIN/1.73
EOSINOPHIL # BLD AUTO: 0 10*3/MM3 (ref 0–0.4)
EOSINOPHIL NFR BLD AUTO: 0 % (ref 0.3–6.2)
ERYTHROCYTE [DISTWIDTH] IN BLOOD BY AUTOMATED COUNT: 12.1 % (ref 12.3–15.4)
FLUAV SUBTYP SPEC NAA+PROBE: NOT DETECTED
FLUBV RNA ISLT QL NAA+PROBE: NOT DETECTED
GEN 5 2HR TROPONIN T REFLEX: 37 NG/L
GLOBULIN UR ELPH-MCNC: 2.9 GM/DL
GLUCOSE BLDC GLUCOMTR-MCNC: 112 MG/DL (ref 70–130)
GLUCOSE BLDC GLUCOMTR-MCNC: 85 MG/DL (ref 70–130)
GLUCOSE BLDC GLUCOMTR-MCNC: 93 MG/DL (ref 70–130)
GLUCOSE SERPL-MCNC: 137 MG/DL (ref 65–99)
GLUCOSE SERPL-MCNC: 88 MG/DL (ref 65–99)
HADV DNA SPEC NAA+PROBE: NOT DETECTED
HCOV 229E RNA SPEC QL NAA+PROBE: NOT DETECTED
HCOV HKU1 RNA SPEC QL NAA+PROBE: NOT DETECTED
HCOV NL63 RNA SPEC QL NAA+PROBE: NOT DETECTED
HCOV OC43 RNA SPEC QL NAA+PROBE: NOT DETECTED
HCT VFR BLD AUTO: 41.1 % (ref 37.5–51)
HGB BLD-MCNC: 14.1 G/DL (ref 13–17.7)
HMPV RNA NPH QL NAA+NON-PROBE: NOT DETECTED
HPIV1 RNA ISLT QL NAA+PROBE: NOT DETECTED
HPIV2 RNA SPEC QL NAA+PROBE: NOT DETECTED
HPIV3 RNA NPH QL NAA+PROBE: NOT DETECTED
HPIV4 P GENE NPH QL NAA+PROBE: NOT DETECTED
IMM GRANULOCYTES # BLD AUTO: 0.22 10*3/MM3 (ref 0–0.05)
IMM GRANULOCYTES NFR BLD AUTO: 1.3 % (ref 0–0.5)
LEFT ATRIUM VOLUME INDEX: 16.4 ML/M2
LYMPHOCYTES # BLD AUTO: 0.46 10*3/MM3 (ref 0.7–3.1)
LYMPHOCYTES NFR BLD AUTO: 2.8 % (ref 19.6–45.3)
M PNEUMO IGG SER IA-ACNC: NOT DETECTED
MCH RBC QN AUTO: 32.9 PG (ref 26.6–33)
MCHC RBC AUTO-ENTMCNC: 34.3 G/DL (ref 31.5–35.7)
MCV RBC AUTO: 95.8 FL (ref 79–97)
MONOCYTES # BLD AUTO: 1.24 10*3/MM3 (ref 0.1–0.9)
MONOCYTES NFR BLD AUTO: 7.4 % (ref 5–12)
NEUTROPHILS NFR BLD AUTO: 14.71 10*3/MM3 (ref 1.7–7)
NEUTROPHILS NFR BLD AUTO: 88.4 % (ref 42.7–76)
NRBC BLD AUTO-RTO: 0 /100 WBC (ref 0–0.2)
NT-PROBNP SERPL-MCNC: 2851 PG/ML (ref 0–1800)
PLATELET # BLD AUTO: 161 10*3/MM3 (ref 140–450)
PMV BLD AUTO: 10.7 FL (ref 6–12)
POTASSIUM SERPL-SCNC: 4.3 MMOL/L (ref 3.5–5.2)
POTASSIUM SERPL-SCNC: 4.7 MMOL/L (ref 3.5–5.2)
PROT SERPL-MCNC: 6.5 G/DL (ref 6–8.5)
QT INTERVAL: 404 MS
QTC INTERVAL: 443 MS
RBC # BLD AUTO: 4.29 10*6/MM3 (ref 4.14–5.8)
RHINOVIRUS RNA SPEC NAA+PROBE: NOT DETECTED
RSV RNA NPH QL NAA+NON-PROBE: NOT DETECTED
SARS-COV-2 RNA NPH QL NAA+NON-PROBE: NOT DETECTED
SINUS: 3 CM
SODIUM SERPL-SCNC: 127 MMOL/L (ref 136–145)
SODIUM SERPL-SCNC: 130 MMOL/L (ref 136–145)
TROPONIN T DELTA: 8 NG/L
TROPONIN T SERPL HS-MCNC: 29 NG/L
WBC NRBC COR # BLD AUTO: 16.65 10*3/MM3 (ref 3.4–10.8)

## 2024-08-17 PROCEDURE — 25510000001 PERFLUTREN 6.52 MG/ML SUSPENSION 2 ML VIAL: Performed by: INTERNAL MEDICINE

## 2024-08-17 PROCEDURE — 93306 TTE W/DOPPLER COMPLETE: CPT

## 2024-08-17 PROCEDURE — 80053 COMPREHEN METABOLIC PANEL: CPT | Performed by: EMERGENCY MEDICINE

## 2024-08-17 PROCEDURE — 83880 ASSAY OF NATRIURETIC PEPTIDE: CPT | Performed by: EMERGENCY MEDICINE

## 2024-08-17 PROCEDURE — 25010000002 PIPERACILLIN SOD-TAZOBACTAM PER 1 G: Performed by: EMERGENCY MEDICINE

## 2024-08-17 PROCEDURE — 85025 COMPLETE CBC W/AUTO DIFF WBC: CPT | Performed by: EMERGENCY MEDICINE

## 2024-08-17 PROCEDURE — 25510000001 IOPAMIDOL PER 1 ML: Performed by: EMERGENCY MEDICINE

## 2024-08-17 PROCEDURE — 93306 TTE W/DOPPLER COMPLETE: CPT | Performed by: INTERNAL MEDICINE

## 2024-08-17 PROCEDURE — 93005 ELECTROCARDIOGRAM TRACING: CPT | Performed by: EMERGENCY MEDICINE

## 2024-08-17 PROCEDURE — 71045 X-RAY EXAM CHEST 1 VIEW: CPT

## 2024-08-17 PROCEDURE — 0202U NFCT DS 22 TRGT SARS-COV-2: CPT | Performed by: EMERGENCY MEDICINE

## 2024-08-17 PROCEDURE — 99222 1ST HOSP IP/OBS MODERATE 55: CPT | Performed by: INTERNAL MEDICINE

## 2024-08-17 PROCEDURE — 71275 CT ANGIOGRAPHY CHEST: CPT

## 2024-08-17 PROCEDURE — 84484 ASSAY OF TROPONIN QUANT: CPT | Performed by: EMERGENCY MEDICINE

## 2024-08-17 PROCEDURE — 25010000002 PIPERACILLIN SOD-TAZOBACTAM PER 1 G: Performed by: STUDENT IN AN ORGANIZED HEALTH CARE EDUCATION/TRAINING PROGRAM

## 2024-08-17 PROCEDURE — 36415 COLL VENOUS BLD VENIPUNCTURE: CPT | Performed by: EMERGENCY MEDICINE

## 2024-08-17 PROCEDURE — 93010 ELECTROCARDIOGRAM REPORT: CPT | Performed by: INTERNAL MEDICINE

## 2024-08-17 PROCEDURE — 99223 1ST HOSP IP/OBS HIGH 75: CPT | Performed by: STUDENT IN AN ORGANIZED HEALTH CARE EDUCATION/TRAINING PROGRAM

## 2024-08-17 PROCEDURE — 99291 CRITICAL CARE FIRST HOUR: CPT

## 2024-08-17 PROCEDURE — 83605 ASSAY OF LACTIC ACID: CPT | Performed by: EMERGENCY MEDICINE

## 2024-08-17 PROCEDURE — 87040 BLOOD CULTURE FOR BACTERIA: CPT | Performed by: EMERGENCY MEDICINE

## 2024-08-17 PROCEDURE — 73560 X-RAY EXAM OF KNEE 1 OR 2: CPT

## 2024-08-17 PROCEDURE — 82948 REAGENT STRIP/BLOOD GLUCOSE: CPT

## 2024-08-17 RX ORDER — INSULIN LISPRO 100 [IU]/ML
2-9 INJECTION, SOLUTION INTRAVENOUS; SUBCUTANEOUS
Status: DISCONTINUED | OUTPATIENT
Start: 2024-08-17 | End: 2024-08-21 | Stop reason: HOSPADM

## 2024-08-17 RX ORDER — ONDANSETRON 4 MG/1
4 TABLET, ORALLY DISINTEGRATING ORAL EVERY 6 HOURS PRN
Status: DISCONTINUED | OUTPATIENT
Start: 2024-08-17 | End: 2024-08-21 | Stop reason: HOSPADM

## 2024-08-17 RX ORDER — POLYETHYLENE GLYCOL 3350 17 G/17G
17 POWDER, FOR SOLUTION ORAL DAILY PRN
Qty: 510 G | Refills: 0 | Status: SHIPPED | OUTPATIENT
Start: 2024-08-17 | End: 2024-08-17

## 2024-08-17 RX ORDER — DEXTROSE MONOHYDRATE 25 G/50ML
25 INJECTION, SOLUTION INTRAVENOUS
Status: DISCONTINUED | OUTPATIENT
Start: 2024-08-17 | End: 2024-08-21 | Stop reason: HOSPADM

## 2024-08-17 RX ORDER — ATENOLOL 25 MG/1
12.5 TABLET ORAL DAILY
Status: DISCONTINUED | OUTPATIENT
Start: 2024-08-17 | End: 2024-08-21 | Stop reason: HOSPADM

## 2024-08-17 RX ORDER — AMOXICILLIN 250 MG
2 CAPSULE ORAL 2 TIMES DAILY PRN
Status: DISCONTINUED | OUTPATIENT
Start: 2024-08-17 | End: 2024-08-19

## 2024-08-17 RX ORDER — ONDANSETRON 4 MG/1
4 TABLET, ORALLY DISINTEGRATING ORAL EVERY 8 HOURS PRN
Qty: 15 TABLET | Refills: 0 | Status: SHIPPED | OUTPATIENT
Start: 2024-08-17 | End: 2024-08-17

## 2024-08-17 RX ORDER — GLIPIZIDE 5 MG/1
5 TABLET, FILM COATED, EXTENDED RELEASE ORAL DAILY
COMMUNITY
Start: 2024-07-23

## 2024-08-17 RX ORDER — ONDANSETRON 2 MG/ML
4 INJECTION INTRAMUSCULAR; INTRAVENOUS EVERY 6 HOURS PRN
Status: DISCONTINUED | OUTPATIENT
Start: 2024-08-17 | End: 2024-08-21 | Stop reason: HOSPADM

## 2024-08-17 RX ORDER — BISACODYL 10 MG
10 SUPPOSITORY, RECTAL RECTAL DAILY PRN
Status: DISCONTINUED | OUTPATIENT
Start: 2024-08-17 | End: 2024-08-19

## 2024-08-17 RX ORDER — RANOLAZINE 500 MG/1
500 TABLET, EXTENDED RELEASE ORAL 2 TIMES DAILY
Status: DISCONTINUED | OUTPATIENT
Start: 2024-08-17 | End: 2024-08-21 | Stop reason: HOSPADM

## 2024-08-17 RX ORDER — MULTIPLE VITAMINS W/ MINERALS TAB 9MG-400MCG
1 TAB ORAL DAILY
Status: DISCONTINUED | OUTPATIENT
Start: 2024-08-18 | End: 2024-08-21 | Stop reason: HOSPADM

## 2024-08-17 RX ORDER — FEXOFENADINE HCL AND PSEUDOEPHEDRINE HCI 60; 120 MG/1; MG/1
1 TABLET, EXTENDED RELEASE ORAL 2 TIMES DAILY
COMMUNITY

## 2024-08-17 RX ORDER — LISINOPRIL 5 MG/1
5 TABLET ORAL DAILY
Status: DISCONTINUED | OUTPATIENT
Start: 2024-08-17 | End: 2024-08-21 | Stop reason: HOSPADM

## 2024-08-17 RX ORDER — POLYETHYLENE GLYCOL 3350 17 G/17G
17 POWDER, FOR SOLUTION ORAL DAILY PRN
Status: DISCONTINUED | OUTPATIENT
Start: 2024-08-17 | End: 2024-08-19

## 2024-08-17 RX ORDER — OXAZEPAM 15 MG/1
15 CAPSULE ORAL DAILY
Status: DISCONTINUED | OUTPATIENT
Start: 2024-08-17 | End: 2024-08-21 | Stop reason: HOSPADM

## 2024-08-17 RX ORDER — BISACODYL 5 MG/1
5 TABLET, DELAYED RELEASE ORAL DAILY PRN
Status: DISCONTINUED | OUTPATIENT
Start: 2024-08-17 | End: 2024-08-19

## 2024-08-17 RX ORDER — IBUPROFEN 600 MG/1
1 TABLET ORAL
Status: DISCONTINUED | OUTPATIENT
Start: 2024-08-17 | End: 2024-08-21 | Stop reason: HOSPADM

## 2024-08-17 RX ORDER — SODIUM CHLORIDE 0.9 % (FLUSH) 0.9 %
10 SYRINGE (ML) INJECTION AS NEEDED
Status: DISCONTINUED | OUTPATIENT
Start: 2024-08-17 | End: 2024-08-21 | Stop reason: HOSPADM

## 2024-08-17 RX ORDER — ASCORBIC ACID 500 MG
500 TABLET ORAL DAILY
Status: DISCONTINUED | OUTPATIENT
Start: 2024-08-18 | End: 2024-08-21 | Stop reason: HOSPADM

## 2024-08-17 RX ORDER — ASPIRIN 81 MG/1
81 TABLET ORAL DAILY
Status: DISCONTINUED | OUTPATIENT
Start: 2024-08-17 | End: 2024-08-21 | Stop reason: HOSPADM

## 2024-08-17 RX ORDER — NICOTINE POLACRILEX 4 MG
15 LOZENGE BUCCAL
Status: DISCONTINUED | OUTPATIENT
Start: 2024-08-17 | End: 2024-08-21 | Stop reason: HOSPADM

## 2024-08-17 RX ORDER — IOPAMIDOL 755 MG/ML
100 INJECTION, SOLUTION INTRAVASCULAR
Status: COMPLETED | OUTPATIENT
Start: 2024-08-17 | End: 2024-08-17

## 2024-08-17 RX ADMIN — PERFLUTREN 2 ML: 6.52 INJECTION, SUSPENSION INTRAVENOUS at 16:52

## 2024-08-17 RX ADMIN — IOPAMIDOL 95 ML: 755 INJECTION, SOLUTION INTRAVENOUS at 09:23

## 2024-08-17 RX ADMIN — PIPERACILLIN AND TAZOBACTAM 3.38 G: 3; .375 INJECTION, POWDER, FOR SOLUTION INTRAVENOUS at 17:49

## 2024-08-17 RX ADMIN — ATENOLOL 12.5 MG: 25 TABLET ORAL at 17:46

## 2024-08-17 RX ADMIN — PIPERACILLIN AND TAZOBACTAM 3.38 G: 3; .375 INJECTION, POWDER, FOR SOLUTION INTRAVENOUS at 10:29

## 2024-08-17 RX ADMIN — Medication 10 ML: at 08:21

## 2024-08-17 RX ADMIN — Medication 5 MG: at 22:48

## 2024-08-17 RX ADMIN — RANOLAZINE 500 MG: 500 TABLET, FILM COATED, EXTENDED RELEASE ORAL at 21:02

## 2024-08-17 RX ADMIN — OXAZEPAM 15 MG: 15 CAPSULE, GELATIN COATED ORAL at 18:44

## 2024-08-17 NOTE — CONSULTS
General Surgery Consultation    Consulting Physician: Inocencia Vieira MD  Referring: Faisal Watson II, MD     Reason for consultation:   Acute cholecystitis    CC:   Abdominal pain    HPI:   The patient is a 89 y.o. male who presented to the hospital with Generalized weakness, abdominal pain, nausea, bloating, low-grade fevers, multiple falls in the past couple of days.  He has also been short of breath.  He was seen in the emergency department and demonstrated concern for acute cholecystitis with mildly elevated LFTs and white count of 16.  CTA chest demonstrating gallbladder with multiple stones and surrounding stranding.  Surgery was consulted for further evaluation.    He has a history of coronary artery disease status post CABG x 4 and was seen by cardiology for evaluation with stat echo, EKG, troponins.  He is also on a beta-blocker for A-fib and takes an 81 mg aspirin daily.  Per cardiology he is at moderate risk to proceed with surgery.      Past Medical History:  Past Medical History:   Diagnosis Date    CAD (coronary artery disease)     Diabetes mellitus     GERD (gastroesophageal reflux disease)     History of cardiac cath     7/23/12 - LVEF 65%. 1+ MV insufficiency. Three vessel CAD. Three of four bypass grafts remain patent. The vein graft to the third marginal branch is a previously documented occlusion. The distal circumflex is unchanged from angiogram 5/4/09. 5/4/09 - LVEF 65%.Three vessel CAD s/p CABG. One fraft to marginal branch is occluded, the rest of the grafts are patent. The marginal branch is diffusely dis    History of echocardiogram     1/9/18 - LA mildly dilated. Normal LV function. Mild MR. Mild-moderate TR. Mild AR. 7/22/2012 - EF 60-65%. Moderate MI, TI, and PI. Mild AI.    Hyperlipidemia     Hypertension     Paroxysmal atrial fibrillation        Past Surgical History:  Past Surgical History:   Procedure Laterality Date    CORONARY ARTERY BYPASS GRAFT  09/06/2005    CABG x4.  Internal mammary artery to LAD, vein graft to diagonal artery, vein graft to OM, vein graft to posterior descending artery.   Denies abdominal surgeries    Medications:  Medications Prior to Admission   Medication Sig Dispense Refill Last Dose    ACCU-CHEK SOFTCLIX LANCETS lancets 1 each by Other route Daily.   8/16/2024    aspirin 81 MG EC tablet Take 1 tablet by mouth Daily.   8/16/2024    atenolol (TENORMIN) 25 MG tablet Take 0.5 tablets by mouth Daily.   8/16/2024    atorvastatin (LIPITOR) 20 MG tablet Take 1 tablet by mouth Daily.   8/16/2024    Blood Glucose Monitoring Suppl (ACCU-CHEK GUIDE) w/Device kit 1 Device Daily.   8/16/2024    Emollient (PETROLATUM 42 % EX OINT WRAPPER) Apply 1 application topically to the appropriate area as directed Every 12 (Twelve) Hours.   8/16/2024    fexofenadine-pseudoephedrine (ALLEGRA-D)  MG per 12 hr tablet Take 1 tablet by mouth 2 (Two) Times a Day.   8/16/2024    glipizide (GLUCOTROL XL) 5 MG ER tablet Take 1 tablet by mouth Daily.   8/16/2024    glucose blood test strip 1 each by Other route Daily.   8/16/2024    Jardiance 10 MG tablet tablet Take 2.5 tablets by mouth Daily.   8/16/2024    lisinopril (PRINIVIL,ZESTRIL) 5 MG tablet Take 1 tablet by mouth Daily.   8/16/2024    multivitamin with minerals (MULTIVITAMIN ADULT PO) Take 1 tablet by mouth Daily.   8/16/2024    oxazepam (SERAX) 15 MG capsule Take 1 capsule by mouth Daily.       PARoxetine (PAXIL) 10 MG tablet Take 1 tablet by mouth Every Morning.   8/16/2024    ranolazine (RANEXA) 500 MG 12 hr tablet Take 1 tablet by mouth 2 (Two) Times a Day. 180 tablet 3 8/16/2024    vitamin C (ASCORBIC ACID) 500 MG tablet Take 1 tablet by mouth.   8/16/2024    Zinc 50 MG capsule Take 1 capsule by mouth Daily.   8/16/2024    nitroglycerin (NITROSTAT) 0.4 MG SL tablet Place 1 tablet under the tongue Every 5 (Five) Minutes As Needed for Chest Pain. Take no more than 3 doses in 15 minutes.   Unknown       Current  Facility-Administered Medications:     [START ON 8/18/2024] ascorbic acid (VITAMIN C) tablet 500 mg, 500 mg, Oral, Daily, Ryan Moreland MD    [Held by provider] aspirin EC tablet 81 mg, 81 mg, Oral, Daily, Ryan Moreland MD    atenolol (TENORMIN) tablet 12.5 mg, 12.5 mg, Oral, Daily, Ryan Moreland MD, 12.5 mg at 08/17/24 1746    sennosides-docusate (PERICOLACE) 8.6-50 MG per tablet 2 tablet, 2 tablet, Oral, BID PRN **AND** polyethylene glycol (MIRALAX) packet 17 g, 17 g, Oral, Daily PRN **AND** bisacodyl (DULCOLAX) EC tablet 5 mg, 5 mg, Oral, Daily PRN **AND** bisacodyl (DULCOLAX) suppository 10 mg, 10 mg, Rectal, Daily PRN, Ryan Moreland MD    dextrose (D50W) (25 g/50 mL) IV injection 25 g, 25 g, Intravenous, Q15 Min PRN, Ryan Moreland MD    dextrose (GLUTOSE) oral gel 15 g, 15 g, Oral, Q15 Min PRN, Ryan Moreland MD    glucagon (GLUCAGEN) injection 1 mg, 1 mg, Intramuscular, Q15 Min PRN, Ryan Moreland MD    insulin lispro (HUMALOG/ADMELOG) injection 2-9 Units, 2-9 Units, Subcutaneous, 4x Daily AC & at Bedtime, Ryan Moreland MD    [Held by provider] lisinopril (PRINIVIL,ZESTRIL) tablet 5 mg, 5 mg, Oral, Daily, Ryan Moreland MD    melatonin tablet 5 mg, 5 mg, Oral, Nightly PRN, Ryan Moreland MD    [START ON 8/18/2024] multivitamin with minerals 1 tablet, 1 tablet, Oral, Daily, Ryan Moreland MD    ondansetron ODT (ZOFRAN-ODT) disintegrating tablet 4 mg, 4 mg, Oral, Q6H PRN **OR** ondansetron (ZOFRAN) injection 4 mg, 4 mg, Intravenous, Q6H PRN, Ryan Moreland MD    oxazepam (SERAX) capsule 15 mg, 15 mg, Oral, Daily, Ryan Moreland MD, 15 mg at 08/17/24 1844    piperacillin-tazobactam (ZOSYN) 3.375 g IVPB in 100 mL NS MBP (CD), 3.375 g, Intravenous, Q8H, Ryan Moreland MD, 3.375 g at 08/17/24 1749    ranolazine (RANEXA) 12 hr tablet 500 mg, 500 mg, Oral, BID, Ryan Moreland MD    [COMPLETED]  Insert Peripheral IV, , , Once **AND** sodium chloride 0.9 % flush 10 mL, 10 mL, Intravenous, PRN, Faisal Watson II, MD, 10 mL at 08/17/24 0821    Allergies:   No Known Allergies    Social History:   Social History     Socioeconomic History    Marital status:    Tobacco Use    Smoking status: Never    Smokeless tobacco: Never   Vaping Use    Vaping status: Never Used   Substance and Sexual Activity    Alcohol use: No    Drug use: Never    Sexual activity: Defer       Family History:   Family History   Problem Relation Age of Onset    Heart disease Mother        Review of Systems:  Positive fever, nausea, bloating, abdominal pain, insomnia, multiple falls, generalized weakness  Denies diarrhea or constipation     Physical Exam:   Vitals:    08/17/24 1651   BP: 126/65   Pulse: 74   Resp:    Temp:    SpO2:      GENERAL: alert, interactive, cooperative, mildly hard of hearing  HEENT: no scleral icterus; moist mucous membranes  NECK: Supple  RESPIRATORY: normal work of breathing   CARDIOVASCULAR: Perfused, no JVD, trace bilateral pedal edema  GASTROINTESTINAL: Abdomen soft, tender in the right upper quadrant with positive Monreal sign, no rebound  : No [place]  MUSCULOSKELETAL: no cyanosis or edema   NEUROLOGIC: alert and oriented, normal speech, no gross focal deficits   SKIN: warm, no rash, no jaundice      Diagnostic workup:     Pertinent labs:   Results from last 7 days   Lab Units 08/17/24  0821   WBC 10*3/mm3 16.65*   HEMOGLOBIN g/dL 14.1   HEMATOCRIT % 41.1   PLATELETS 10*3/mm3 161     Results from last 7 days   Lab Units 08/17/24  1717 08/17/24  0821   SODIUM mmol/L 130* 127*   POTASSIUM mmol/L 4.3 4.7   CHLORIDE mmol/L 96* 94*   CO2 mmol/L 22.0 20.0*   BUN mg/dL 33* 34*   CREATININE mg/dL 1.51* 1.60*   CALCIUM mg/dL 9.1 8.9   BILIRUBIN mg/dL  --  0.9   ALK PHOS U/L  --  80   ALT (SGPT) U/L  --  47*   AST (SGOT) U/L  --  64*   GLUCOSE mg/dL 88 137*     BNP 2851  Troponin 29    Imaging:  CTA chest  images and report reviewed, demonstrates multiple stones in the gallbladder with surrounding stranding concerning for acute cholecystitis; there is elevation of the right hemidiaphragm and what appears to be sympathetic pleural effusion on the right side    Assessment and plan:   The patient is a 89 y.o. male with type 2 diabetes mellitus, hypertension, hyperlipidemia, GERD, paroxysmal A-fib, CAD status post CABG x 4 on aspirin 81 mg and beta-blocker, who presents with acute cholecystitis with acute renal insufficiency    Recommend IV antibiotics. Discussed with patient and family recommendation for laparoscopic possible open cholecystectomy with intraoperative cholangiogram. I discussed the procedure, rationale, the risks (including but not limited to bleeding, infection, injury to surrounding structures, risk for conversion to open procedure, risk of injury to the biliary tree or postoperative bile leak, changes in bowel function accompanying cholecystectomy), benefits, alternatives (antibiotics with or without gallbladder drainage), and postoperative expectations including recommendations for postop diet, wound care, activity restrictions, and pain control.  All questions were answered.  The patient wishes to proceed with surgery.    Discussed with cardiology - I spoke with Dr. Rojo who has performed cardiac risk assessment and determined he is moderate risk for surgery.  He wants to repeat BMP following some fluids given the patient's TOM to ensure that we have a good sense of patient's volume status, cardiac function, renal function, which I agree with.    Will tentatively schedule him for surgery tomorrow pending review of morning labs and follow-up with cardiology.  If he is not improving or is deemed unacceptable risk, will recommend perc cholecystostomy and antibiotics.    Inocencia Vieira M.D.  General, Robotic, and Endoscopic Surgery  [facility], [place], operated by [facility] [address]  [place], [place], [address]  P: [phone]  F: [phone]

## 2024-08-17 NOTE — CONSULTS
"Date of Hospital Visit: 2024  Encounter Provider: Dada Rojo MD  Place of Service: [facility] CARDIOLOGY  Patient Name: Shaheen Saini  :1935  Referral Provider: Ryan Moreland, *    Chief complaint Knee weakness    History of Present Illness    This is a pleasant 89 year old male with a past medical history significant for diabetes, hyperlipidemia, hypertension, GERD, CAD, CABG , mild aortic insufficiency, and PAF. Follows with Dr Ellis.     He presented to the ER on 2024 complaining of his right knee \"giving out\". He has a history of osteoarthritis and has chronic knee pain. He also reports shortness of breath has been worse over the last 3 days, but denies chest pain. In the ER his heart rate was 82, blood pressure 140/70. Sinus rhythm with nonspecific repolarization abnormalities on EKG which is unchanged from previous. Other work up included HS troponin 29, proBNP 2,851, sodium 127, creatinine 1.60, WBC 16.65. RVP negative for acute viral illness. Chest x-ray negative for acute process. CTA chest negative for PE and dissection, however, upper abdomen images highly suspicious for acute cholecystitis.     HPI was reviewed, updated and amended when necessary.      ECHO 10/3/2022  Calculated left ventricular EF = 62.2% Estimated left ventricular EF was in agreement with the calculated left ventricular EF. Left ventricular systolic function is normal.  Left ventricular diastolic function is consistent with (grade I) impaired relaxation.  There is calcification of the aortic valve mainly affecting the non-coronary, left coronary and right coronary cusp(s).  The mean gradient across aortic valve is 7.7 mmHg  Mild aortic valve regurgitation is present.  Moderate tricuspid valve regurgitation is present.  Estimated right ventricular systolic pressure from tricuspid regurgitation is mildly elevated (35-45 mmHg).  Mild mitral valve regurgitation is " present.    Past Medical History:   Diagnosis Date    CAD (coronary artery disease)     Diabetes mellitus     GERD (gastroesophageal reflux disease)     History of cardiac cath     7/23/12 - LVEF 65%. 1+ MV insufficiency. Three vessel CAD. Three of four bypass grafts remain patent. The vein graft to the third marginal branch is a previously documented occlusion. The distal circumflex is unchanged from angiogram 5/4/09. 5/4/09 - LVEF 65%.Three vessel CAD s/p CABG. One fraft to marginal branch is occluded, the rest of the grafts are patent. The marginal branch is diffusely dis    History of echocardiogram     1/9/18 - LA mildly dilated. Normal LV function. Mild MR. Mild-moderate TR. Mild AR. 7/22/2012 - EF 60-65%. Moderate MI, TI, and PI. Mild AI.    Hyperlipidemia     Hypertension     Paroxysmal atrial fibrillation        Past Surgical History:   Procedure Laterality Date    CORONARY ARTERY BYPASS GRAFT  09/06/2005    CABG x4. Internal mammary artery to LAD, vein graft to diagonal artery, vein graft to OM, vein graft to posterior descending artery.       (Not in a hospital admission)      Current Meds  Scheduled Meds:insulin lispro, 2-9 Units, Subcutaneous, 4x Daily AC & at Bedtime      Continuous Infusions:   PRN Meds:.  senna-docusate sodium **AND** polyethylene glycol **AND** bisacodyl **AND** bisacodyl    dextrose    dextrose    glucagon (human recombinant)    ondansetron ODT **OR** ondansetron    [COMPLETED] Insert Peripheral IV **AND** sodium chloride    Allergies as of 08/17/2024    (No Known Allergies)       Social History     Socioeconomic History    Marital status:    Tobacco Use    Smoking status: Never    Smokeless tobacco: Never   Substance and Sexual Activity    Alcohol use: No    Drug use: Never    Sexual activity: Defer       Family History   Problem Relation Age of Onset    Heart disease Mother      Past surgical, medical, social and family history was reviewed, updated and amended when  "necessary.    Review of Systems   Constitutional: Negative for chills and fever.   HENT:  Negative for hoarse voice and sore throat.    Eyes:  Negative for double vision and photophobia.   Cardiovascular:  Positive for palpitations. Negative for chest pain, leg swelling, near-syncope, orthopnea, paroxysmal nocturnal dyspnea and syncope.   Respiratory:  Negative for cough and wheezing.    Skin:  Negative for poor wound healing and rash.   Musculoskeletal:  Negative for arthritis and joint swelling.   Gastrointestinal:  Positive for abdominal pain. Negative for bloating, hematemesis and hematochezia.   Neurological:  Negative for dizziness and focal weakness.   Psychiatric/Behavioral:  Negative for depression and suicidal ideas.             Objective:   Temp:  [98 °F (36.7 °C)] 98 °F (36.7 °C)  Heart Rate:  [70-82] 70  Resp:  [18-20] 18  BP: (121-140)/(45-70) 123/56  Body mass index is 29.13 kg/m².  Flowsheet Rows      Flowsheet Row First Filed Value   Admission Height 177.8 cm (70\") Documented at 08/17/2024 0822   Admission Weight 92.1 kg (203 lb) Documented at 08/17/2024 0822          Vitals:    08/17/24 0951   BP: 123/56   Pulse: 70   Resp: 18   Temp:    SpO2: 94%       Vitals reviewed.   Constitutional:       Appearance: Healthy appearance. Not in distress.   Neck:      Vascular: JVD normal.   Pulmonary:      Effort: Pulmonary effort is normal.      Breath sounds: Normal breath sounds. No wheezing. No rhonchi. No rales.   Chest:      Chest wall: Not tender to palpatation.   Cardiovascular:      PMI at left midclavicular line. Normal rate. Regular rhythm. Normal S1. Normal S2.       Murmurs: There is no murmur.      No gallop.  No click. No rub.   Pulses:     Intact distal pulses.   Edema:     Peripheral edema absent.   Abdominal:      General: There is distension.      Tenderness: There is abdominal tenderness.   Musculoskeletal: Normal range of motion.         General: No tenderness. Skin:     General: Skin is " warm and dry.   Neurological:      General: No focal deficit present.      Mental Status: Alert and oriented to person, place and time.                 Lab Review:      Results from last 7 days   Lab Units 08/17/24  0821   SODIUM mmol/L 127*   POTASSIUM mmol/L 4.7   CHLORIDE mmol/L 94*   CO2 mmol/L 20.0*   BUN mg/dL 34*   CREATININE mg/dL 1.60*   CALCIUM mg/dL 8.9   BILIRUBIN mg/dL 0.9   ALK PHOS U/L 80   ALT (SGPT) U/L 47*   AST (SGOT) U/L 64*   GLUCOSE mg/dL 137*     Results from last 7 days   Lab Units 08/17/24  0821   HSTROP T ng/L 29*     @LABRCNTbnp@  Results from last 7 days   Lab Units 08/17/24  0821   WBC 10*3/mm3 16.65*   HEMOGLOBIN g/dL 14.1   HEMATOCRIT % 41.1   PLATELETS 10*3/mm3 161             @LABRCNTIP(chol,trig,hdl,ldl)    I personally viewed and interpreted the patient's EKG/Telemetry data                Acute cholecystitis    Hyperlipidemia    Hypertension    CAD (coronary artery disease)    History of coronary artery bypass surgery    Type 2 diabetes mellitus, without long-term current use of insulin    PAF (paroxysmal atrial fibrillation)    ARF (acute renal failure)    Hyponatremia    Leukocytosis    Assessment and Plan:    Dyspnea -etiology uncertain.  BNP is elevated as is creatinine with acute on chronic hyponatremia.  There is no JVD at rest and I did not perform hepatojugular reflex maneuver given the acute cholecystitis concerns.  He has seen Dr. Berger within the last few months and believes that he had an echocardiogram done a few months ago but does records are not in the system.  We are going to perform a stat bedside echocardiogram to further evaluate prior to comment on preoperative risk assessment.  Chest x-ray shows mild prominence of pulmonary vasculature but his right hemidiaphragm is significantly elevated from previous.  Obviously we cannot delay treatment of acute cholecystitis but I will have a conversation with Dr. Vieira once echo is completed.  CAD -no angina.   Minimally elevated troponin is noted.  No acute ischemic changes on EKG.  No indication for ischemic workup.  Hypertension -mildly elevated but seems adequate.  PAF -sinus rhythm currently.  It does not appear that he is on anticoagulation as outpatient.  Recommend continuation of preoperative beta-blocker.  TOM -he was given fluids in the emergency room.  We will check an echocardiogram and look at RV function as well as IVC to estimate right-sided pressures and ability to give fluids intraoperatively if necessary.  Acute on chronic hyponatremia -he appears chronically hyponatremic with serum sodium levels in the low 130s.  He is 128 on exam today.  We will recheck in the morning since he received IV fluids.  Leukocytosis    Dada Rojo MD  08/17/24  11:01 EDT.  Time spent in reviewing chart, discussion and examination:          Addendum: Problems with uploading the images from echocardiogram but I am able to review them all without the ability to complete the report at this time.  We are not working on the technical aspects of this however the patient has normal heart function with normal RV size and function.  Calcified aortic valve with mild stenosis and mild regurgitation.  Mild mitral regurgitation is noted.  Mild to moderate pulmonary hypertension all of which looks to be chronic stable issues.  The IVC was not well-visualized but there is no clear evidence for pulmonary edema and with TOM we will hold on diuretics at this time.  Repeat labs.  I think the patient has an intermediate risk for operative intervention concerning cholecystitis and I do not see any indication for further cardiac testing at this time.

## 2024-08-17 NOTE — ED NOTES
.Nursing report ED to floor  Shaheen Saini  89 y.o.  male    HPI :  HPI (Adult)  Stated Reason for Visit: Knee weakness    Chief Complaint  Chief Complaint   Patient presents with    Knee Pain       Admitting doctor:   Ryan Moreland MD    Admitting diagnosis:   The primary encounter diagnosis was Acute cholecystitis. Diagnoses of Hyponatremia and TOM (acute kidney injury) were also pertinent to this visit.    Code status:   Current Code Status       Date Active Code Status Order ID Comments User Context       8/17/2024 1004 CPR (Attempt to Resuscitate) [id number]  Ryan Moreland MD ED        Question Answer    Code Status (Patient has no pulse and is not breathing) CPR (Attempt to Resuscitate)    Medical Interventions (Patient has pulse or is breathing) Full    Level Of Support Discussed With Patient                    Allergies:   Patient has no known allergies.    Isolation:   No active isolations    Intake and Output  No intake or output data in the 24 hours ending 08/17/24 1019    Weight:       08/17/24  0822   Weight: 92.1 kg (203 lb)       Most recent vitals:   Vitals:    08/17/24 0834 08/17/24 0842 08/17/24 0851 08/17/24 0951   BP:   121/51 123/56   BP Location:       Patient Position:       Pulse: 74 76 71 70   Resp:   19 18   Temp:       SpO2: 95% 91% 90% 94%   Weight:       Height:           Active LDAs/IV Access:   Lines, Drains & Airways       Active LDAs       Name Placement date Placement time Site Days    Peripheral IV 08/17/24 0922 Left Antecubital 08/17/24  0922  Antecubital  less than 1                    Labs (abnormal labs have a star):   Labs Reviewed   COMPREHENSIVE METABOLIC PANEL - Abnormal; Notable for the following components:       Result Value    Glucose 137 (*)     BUN 34 (*)     Creatinine 1.60 (*)     Sodium 127 (*)     Chloride 94 (*)     CO2 20.0 (*)     ALT (SGPT) 47 (*)     AST (SGOT) 64 (*)     eGFR 41.2 (*)     All other components within normal limits     Narrative:     GFR Normal >60  Chronic Kidney Disease <60  Kidney Failure <15    The GFR formula is only valid for adults with stable renal function between ages 18 and 70.   CBC WITH AUTO DIFFERENTIAL - Abnormal; Notable for the following components:    WBC 16.65 (*)     RDW 12.1 (*)     Neutrophil % 88.4 (*)     Lymphocyte % 2.8 (*)     Eosinophil % 0.0 (*)     Immature Grans % 1.3 (*)     Neutrophils, Absolute 14.71 (*)     Lymphocytes, Absolute 0.46 (*)     Monocytes, Absolute 1.24 (*)     Immature Grans, Absolute 0.22 (*)     All other components within normal limits   BNP (IN-HOUSE) - Abnormal; Notable for the following components:    proBNP 2,851.0 (*)     All other components within normal limits    Narrative:     This assay is used as an aid in the diagnosis of individuals suspected of having heart failure. It can be used as an aid in the diagnosis of acute decompensated heart failure (ADHF) in patients presenting with signs and symptoms of ADHF to the emergency department (ED). In addition, NT-proBNP of <300 pg/mL indicates ADHF is not likely.    Age Range Result Interpretation  NT-proBNP Concentration (pg/mL:      <50             Positive            >450                   Gray                 300-450                    Negative             <300    50-75           Positive            >900                  Gray                300-900                  Negative            <300      >75             Positive            >1800                  Gray                300-1800                  Negative            <300   TROPONIN - Abnormal; Notable for the following components:    HS Troponin T 29 (*)     All other components within normal limits    Narrative:     High Sensitive Troponin T Reference Range:  <14.0 ng/L- Negative Female for AMI  <22.0 ng/L- Negative Male for AMI  >=14 - Abnormal Female indicating possible myocardial injury.  >=22 - Abnormal Male indicating possible myocardial injury.   Clinicians would  have to utilize clinical acumen, EKG, Troponin, and serial changes to determine if it is an Acute Myocardial Infarction or myocardial injury due to an underlying chronic condition.        LACTIC ACID, PLASMA - Normal   RESPIRATORY PANEL PCR W/ COVID-19 (SARS-COV-2), NP SWAB IN UTM/VTP, 2 HR TAT   BLOOD CULTURE   BLOOD CULTURE   HIGH SENSITIVITIY TROPONIN T 2HR   POCT GLUCOSE FINGERSTICK   POCT GLUCOSE FINGERSTICK   POCT GLUCOSE FINGERSTICK   POCT GLUCOSE FINGERSTICK   CBC AND DIFFERENTIAL    Narrative:     The following orders were created for panel order CBC & Differential.  Procedure                               Abnormality         Status                     ---------                               -----------         ------                     CBC Auto Differential[[id number]]        Abnormal            Final result                 Please view results for these tests on the individual orders.       EKG:   ECG 12 Lead Dyspnea   Preliminary Result   HEART RATE=72  bpm   RR Kjdsgvui=105  ms   MI Wjbeodai=482  ms   P Horizontal Axis=16  deg   P Front Axis=38  deg   QRSD Grfvhjpo=542  ms   QT Rbgsrhsa=801  ms   MUiC=615  ms   QRS Axis=-12  deg   T Wave Axis=23  deg   - OTHERWISE NORMAL ECG -   Sinus rhythm   Minimal ST depression, lateral leads   Date and Time of Study:2024-08-17 08:51:18      Telemetry Scan   Final Result      Telemetry Scan   Final Result          Meds given in ED:   Medications   sodium chloride 0.9 % flush 10 mL (10 mL Intravenous Given 8/17/24 0821)   piperacillin-tazobactam (ZOSYN) 3.375 g IVPB in 100 mL NS MBP (CD) (has no administration in time range)   ondansetron ODT (ZOFRAN-ODT) disintegrating tablet 4 mg (has no administration in time range)     Or   ondansetron (ZOFRAN) injection 4 mg (has no administration in time range)   sennosides-docusate (PERICOLACE) 8.6-50 MG per tablet 2 tablet (has no administration in time range)     And   polyethylene glycol (MIRALAX) packet 17 g (has no  administration in time range)     And   bisacodyl (DULCOLAX) EC tablet 5 mg (has no administration in time range)     And   bisacodyl (DULCOLAX) suppository 10 mg (has no administration in time range)   dextrose (GLUTOSE) oral gel 15 g (has no administration in time range)   dextrose (D50W) (25 g/50 mL) IV injection 25 g (has no administration in time range)   glucagon (GLUCAGEN) injection 1 mg (has no administration in time range)   insulin lispro (HUMALOG/ADMELOG) injection 2-9 Units (has no administration in time range)   iopamidol (ISOVUE-370) 76 % injection 100 mL (95 mL Intravenous Given 8/17/24 0923)       Imaging results:  No radiology results for the last day    Ambulatory status:   - assist x cane     Social issues:   Social History     Socioeconomic History    Marital status:    Tobacco Use    Smoking status: Never    Smokeless tobacco: Never   Substance and Sexual Activity    Alcohol use: No    Drug use: Never    Sexual activity: Defer       Peripheral Neurovascular  Peripheral Neurovascular (Adult)  Peripheral Neurovascular WDL: .WDL except  LLE Neurovascular Assessment  Temperature LLE: warm  Color LLE: no discoloration  Sensation LLE: no numbness, no tenderness, no tingling  RLE Neurovascular Assessment  Temperature RLE: warm  Color RLE: no discoloration  Sensation RLE: other (see comments) (pt reports weakness and right knee giving out)    Neuro Cognitive  Neuro Cognitive (Adult)  Cognitive/Neuro/Behavioral WDL: WDL, arousability, level of consciousness, orientation  Level of Consciousness: Alert  Arousal Level: opens eyes spontaneously  Orientation: oriented x 4    Learning  Learning Assessment (Adult)  Learning Readiness and Ability: no barriers identified  Education Provided  Person Taught: patient  Teaching Method: verbal instruction  Teaching Focus: symptom/problem overview    Respiratory  Respiratory WDL  Respiratory WDL: .WDL except, all  Rhythm/Pattern, Respiratory: shortness of breath  (soa exertion, pt requires 6L oxygento maintain saturation, pt not normally on oxygen)    Abdominal Pain       Pain Assessments  Pain (Adult)  (0-10) Pain Rating: Rest: 2  (0-10) Pain Rating: Activity: 5    NIH Stroke Scale       Mer Moon RN  08/17/24 10:19 EDT

## 2024-08-17 NOTE — DISCHARGE INSTRUCTIONS
Inocencia Vieira MD  [address]  [place] [address]  [phone]    Discharge Instructions: Gall Bladder Surgery    Go home, rest and take it easy today; however, you should get up and move about several times a day to reduce the risk of developing a blood clot in your legs.   You may experience some dizziness or memory loss from the anesthesia. This may last for the next 24 hours. Someone should plan on staying with you for the first 24 hours for your safety.    You may eat a regular diet.   Your incisions are covered with skin glue. This will fall off on its own in 1-2 weeks. Do not worry if it peels off sooner. Keep a clean dry bandage over your former drain site, and change it daily. Ok to shower. No tub baths or swimming.  You may notice some bleeding/drainage on your outer dressings. A little bloody drainage is normal. If the bleeding/drainage is such that the bandage cannot absorb it, remove the dressing, apply clean gauze and apply firm pressure for a full 15 minutes. If the bleeding continues, please call me.   Narcotic pain medicine can cause constipation. Take an over the counter stool softener, like Miaralax or docusate, to prevent constipation while taking narcotics.  It is not unusual to experience pain/discomfort in your shoulders or your ribs after surgery. It is from the gas used during the laparoscopic procedure and usually lasts 1-3 days. The prescription pain medicine is used to treat the surgical incisional pain and does not typically alleviate this “gassy” pain.   No driving for 24 hours and for as long as you are taking your prescription pain medicine.   You will need to call the office at [phone] to schedule a follow-up appointment in 2 weeks.   Remember to contact me for any of the following:   Fever greater than 101 degrees  Severe pain that cannot be controlled by taking your pain pills  Severe nausea or vomiting that cannot be controlled by taking your nausea  pills  Significant bleeding of your incisions  Drainage that has a bad smell or is yellow or green in appearance  Yellowing of your skin or eyes  Altered mental status or severe weakness  Any other questions or concerns

## 2024-08-17 NOTE — ED PROVIDER NOTES
" EMERGENCY DEPARTMENT ENCOUNTER    Room Number:  09/09  PCP: Pal Lawrence MD    HPI:  Chief Complaint: \"My right knee gives out\"  A complete HPI/ROS/PMH/PSH/SH/FH are unobtainable due to: None  Context: Shaheen Saini is a 89 y.o. male who presents to the ED c/o acute right knee weakness.  States it gives out.  He has baseline pain to his right knee due to osteoarthritis.  He notes that he has been short of breath.  Per the patient's son, this has been the most noticeable change and the son thinks that the patient is not actually having knee pain but generalized weakness and shortness of breath contributing to his leg getting weak.  Patient fell to the ground yesterday but did not hit his head.  He is confident of this.  Patient notes having exertional shortness of breath.  No cough or chest pain.  Patient takes Aspir 81 milligrams daily.        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 Internal mammary artery to LAD, vein graft to diagonal artery, vein graft to OM, vein graft to posterior descending artery.         FAMILY HISTORY  Family History   Problem Relation Age of Onset    Heart disease Mother          SOCIAL HISTORY  Social History "     Socioeconomic History    Marital status:    Tobacco Use    Smoking status: Never    Smokeless tobacco: Never   Substance and Sexual Activity    Alcohol use: No    Drug use: Never    Sexual activity: Defer         ALLERGIES  Patient has no known allergies.        REVIEW OF SYSTEMS  Review of Systems     Included in HPI  All systems reviewed and negative except for those discussed in HPI.       PHYSICAL EXAM  ED Triage Vitals   Temp Heart Rate Resp BP SpO2   08/17/24 0806 08/17/24 0806 08/17/24 0806 08/17/24 0806 08/17/24 0806   98 °F (36.7 °C) 82 18 140/70 95 %      Temp src Heart Rate Source Patient Position BP Location FiO2 (%)   -- 08/17/24 0821 08/17/24 0821 08/17/24 0821 --    Monitor Lying Left arm        Physical Exam      GENERAL: no acute distress  HENT: nares patent  EYES: no scleral icterus  CV: regular rhythm, normal rate, 2+ right DP pulse  RESPIRATORY: normal effort, clear to auscultation bilaterally  ABDOMEN: soft, nontender  MUSCULOSKELETAL: no deformity, no redness or warmth or swelling to the right knee, intact right knee extension, stable knee ligaments  NEURO: alert, moves all extremities, follows commands  PSYCH:  calm, cooperative  SKIN: warm, dry    Vital signs and nursing notes reviewed.          LAB RESULTS  No results found for this or any previous visit (from the past 24 hour(s)).    Ordered the above labs and reviewed the results.        RADIOLOGY  No Radiology Exams Resulted Within Past 24 Hours    Ordered the above noted radiological studies. Reviewed by me in PACS.        MEDICATIONS GIVEN IN ER  Medications   sodium chloride 0.9 % flush 10 mL (10 mL Intravenous Given 8/17/24 0821)         ORDERS PLACED DURING THIS VISIT:  Orders Placed This Encounter   Procedures    Comprehensive Metabolic Panel    CBC Auto Differential    Monitor Blood Pressure    Continuous Pulse Oximetry    Insert Peripheral IV    CBC & Differential         OUTPATIENT MEDICATION MANAGEMENT:  Current  Facility-Administered Medications Ordered in Epic   Medication Dose Route Frequency Provider Last Rate Last Admin    sodium chloride 0.9 % flush 10 mL  10 mL Intravenous Faisal Sarabia II, MD   10 mL at 08/17/24 0821     Current Outpatient Medications Ordered in Epic   Medication Sig Dispense Refill    ACCU-CHEK SOFTCLIX LANCETS lancets 1 each by Other route Daily.      amoxicillin-clavulanate (AUGMENTIN) 875-125 MG per tablet Take 1 tablet by mouth 2 (Two) Times a Day for 7 days. 14 tablet 0    aspirin 81 MG EC tablet Take 81 mg by mouth Daily.      atenolol (TENORMIN) 25 MG tablet Take 12.5 mg by mouth Daily.      atorvastatin (LIPITOR) 20 MG tablet Take 20 mg by mouth Daily.      Blood Glucose Monitoring Suppl (ACCU-CHEK GUIDE) w/Device kit 1 Device Daily.      cetirizine (zyrTEC) 10 MG tablet Take 10 mg by mouth Daily.      Emollient (PETROLATUM 42 % EX OINT WRAPPER) Apply 1 application topically to the appropriate area as directed Every 12 (Twelve) Hours.      esomeprazole (nexIUM) 20 MG capsule Take 20 mg by mouth Every Morning Before Breakfast.      glucose blood test strip 1 strip by Other route Daily.      Jardiance 10 MG tablet tablet Take 1 tablet by mouth Daily.      lisinopril (PRINIVIL,ZESTRIL) 5 MG tablet Take 5 mg by mouth Daily.      nitroglycerin (NITROSTAT) 0.4 MG SL tablet Place 0.4 mg under the tongue Every 5 (Five) Minutes As Needed for Chest Pain. Take no more than 3 doses in 15 minutes.      ondansetron ODT (ZOFRAN-ODT) 4 MG disintegrating tablet Place 1 tablet on the tongue Every 8 (Eight) Hours As Needed for Nausea or Vomiting for up to 5 days. 15 tablet 0    oxazepam (SERAX) 15 MG capsule Take 15 mg by mouth Daily.      PARoxetine (PAXIL) 10 MG tablet Take 10 mg by mouth Every Morning.      polyethylene glycol (MIRALAX) 17 GM/SCOOP powder Take 17 g by mouth Daily As Needed (constipation). 510 g 0    ranolazine (RANEXA) 500 MG 12 hr tablet Take 1 tablet by mouth 2 (Two) Times a  Day. 180 tablet 3    vitamin C (ASCORBIC ACID) 500 MG tablet Take 500 mg by mouth.      Zinc 50 MG capsule Take 1 capsule by mouth Daily.         PROCEDURES  Critical Care    Performed by: Faisal Watson II, MD  Authorized by: Faisal Watson II, MD    Critical care provider statement:     Critical care time (minutes):  35    Critical care was necessary to treat or prevent imminent or life-threatening deterioration of the following conditions:  Sepsis    Critical care was time spent personally by me on the following activities:  Ordering and performing treatments and interventions, ordering and review of laboratory studies, ordering and review of radiographic studies, pulse oximetry, re-evaluation of patient's condition, discussions with consultants, evaluation of patient's response to treatment and obtaining history from patient or surrogate            MEDICAL DECISION MAKING, PROGRESS, and CONSULTS    Discussion below represents my analysis of pertinent findings related to patient's condition, differential diagnosis, treatment plan and final disposition.              Differential diagnosis:    Differential diagnosis includes but not limited to CHF, acute coronary syndrome, pulmonary embolism, pneumothorax, pneumonia, asthma/COPD, pulmonary hypertension, deconditioning, anemia, other hematologic etiologies such as CO poisoning, anxiety.         Regarding the patient's knee pain, differential includes osteoarthritis, fracture, septic joint, DVT.                 Independent interpretation of labs, radiology studies, and discussions with consultants:  ED Course as of 08/18/24 1955   Sat Aug 17, 2024   0843 WBC(!): 16.65 [TD]   0853 EKG independently interpreted by myself.  Time 8:51 AM.  Sinus rhythm.  Heart rate 72.  Normal axis.  Minimal ST depression in lead I and aVL as well as in leads V4 through V6. [TD]   0902 Creatinine(!): 1.60 [TD]   0902 Sodium(!): 127 [TD]   0902 CO2(!): 20.0 [TD]   0932 Chest  x-ray independently interpreted by myself.  No evidence of pneumonia. [TD]   0933 proBNP(!): 2,851.0 [TD]   0933 HS Troponin T(!): 29 [TD]   0933 CT imaging of the chest independently interpreted by myself.  No evidence of pulmonary embolism.  He does have cholecystitis. [TD]   0934 I discussed the case with Dr. Jenkins, CT evidence of cholecystitis. [TD]   1014 I discussed the case with Dr. Vieira, general surgery.  She will follow along in consultation.  She requested cardiology evaluation prior to surgery. [TD]   1015 I discussed the case with Dr. Moreland, hospitalist.  He will admit. [TD]      ED Course User Index  [TD] Faisal Watson II, MD               DIAGNOSIS  Final diagnoses:   Acute cholecystitis   Hyponatremia   TOM (acute kidney injury)         DISPOSITION  Admit      Latest Documented Vital Signs:  As of 08:32 EDT  BP- 121/45 HR- 78 Temp- 98 °F (36.7 °C) O2 sat- 95%      --    Please note that portions of this were completed with a voice recognition program.       Note Disclaimer: At [facility], we believe that sharing information builds trust and better relationships. You are receiving this note because you are receiving care at [facility] or recently visited. It is possible you will see health information before a provider has talked with you about it. This kind of information can be easy to misunderstand. To help you fully understand what it means for your health, we urge you to discuss this note with your provider.         Faisal Watson II, MD  08/18/24 1956

## 2024-08-17 NOTE — ED NOTES
"Pt arrives via ems from home for right knee \"giving out.\" Pt denies pain to his right knee but reports he gets weak and goes to the floor. Pt reports episodes of his knee giving out are occurring more frequently. Pt states he uses a walker and cane to ambulate at home. Pt also reports soa with exertion. Son is at bedside. Falls band placed on pt  "

## 2024-08-17 NOTE — PROGRESS NOTES
[facility] Clinical Pharmacy Services: Piperacillin-Tazobactam Consult    Pt Name: Shaheen Saini   : 1935    Indication: Intra-Abdominal Infection    Relevant clinical data and objective history reviewed:    Past Medical History:   Diagnosis Date    CAD (coronary artery disease)     Diabetes mellitus     GERD (gastroesophageal reflux disease)     History of cardiac cath     12 - LVEF 65%. 1+ MV insufficiency. Three vessel CAD. Three of four bypass grafts remain patent. The vein graft to the third marginal branch is a previously documented occlusion. The distal circumflex is unchanged from angiogram 09. 09 - LVEF 65%.Three vessel CAD s/p CABG. One fraft to marginal branch is occluded, the rest of the grafts are patent. The marginal branch is diffusely dis    History of echocardiogram     18 - LA mildly dilated. Normal LV function. Mild MR. Mild-moderate TR. Mild AR. 2012 - EF 60-65%. Moderate MI, TI, and PI. Mild AI.    Hyperlipidemia     Hypertension     Paroxysmal atrial fibrillation      Creatinine   Date Value Ref Range Status   2024 1.60 (H) 0.76 - 1.27 mg/dL Final   2023 1.07 0.73 - 1.18 mg/dL Final   10/21/2022 1.07 0.76 - 1.27 mg/dL Final   10/20/2022 1.08 0.76 - 1.27 mg/dL Final   05/10/2022 0.97 0.73 - 1.18 mg/dL Final   05/10/2022 43.7 15 - 500 mg/dL Final   2021 1.0 0.7 - 1.5 mg/dL Final     BUN   Date Value Ref Range Status   2024 34 (H) 8 - 23 mg/dL Final   2023 22 8 - 26 mg/dL Final     Estimated Creatinine Clearance: 36.7 mL/min (A) (by C-G formula based on SCr of 1.6 mg/dL (H)).    Lab Results   Component Value Date    WBC 16.65 (H) 2024     Temp Readings from Last 3 Encounters:   24 97.9 °F (36.6 °C) (Oral)   10/21/22 98.8 °F (37.1 °C) (Oral)   20 97.1 °F (36.2 °C) (Tympanic)      Assessment/Plan  Estimated CrCl >20 mL/min at this time; BMI 29.58 kg/m2  Will start piperacillin-tazobactam 3.375 g IV every 8 hours      Pharmacy will continue to follow daily while on piperacillin-tazobactam and adjust as needed. Thank you for this consult.    Evelyn Esqueda, [facility]  Clinical Pharmacist

## 2024-08-17 NOTE — H&P
"    Patient Name:  Shaheen Saini  YOB: 1935  MRN:  [id number]  Admit Date:  8/17/2024  Patient Care Team:  Pal Lawrence MD as PCP - General (Family Medicine)  Tommy Ellis MD as Consulting Physician (Cardiology)      Subjective   History Present Illness     Chief Complaint   Patient presents with    Knee Pain       Mr. Saini is a 89 y.o. male with CAD status post CABG, HTN, DM2, PAF presenting with dyspnea and abdominal pain.  History obtained from both patient and family at bedside.  For the past couple days he has been complaining of his right knee \"giving out.\"  A few days ago he started having some abdominal pain in the lower part of his abdomen which eventually radiated to his right upper quadrant.  He is also had increasing shortness of breath over the past few days.  Denies chest pain, diaphoresis, jaw pain, arm pain.  He has had some nausea though no emesis.      Review of Systems   Constitutional:  Negative for activity change, appetite change and fever.   HENT:  Negative for hearing loss, rhinorrhea and sinus pressure.    Eyes:  Negative for pain and discharge.   Respiratory:  Positive for shortness of breath. Negative for cough, chest tightness and wheezing.    Cardiovascular:  Negative for chest pain and leg swelling.   Gastrointestinal:  Positive for abdominal pain and nausea. Negative for abdominal distention, blood in stool and vomiting.   Endocrine: Negative for cold intolerance and heat intolerance.   Genitourinary:  Negative for dysuria, frequency and urgency.   Musculoskeletal:  Negative for arthralgias and back pain.   Skin:  Negative for rash and wound.   Neurological:  Positive for weakness. Negative for dizziness, speech difficulty and headaches.   Hematological:  Negative for adenopathy.   Psychiatric/Behavioral:  Negative for hallucinations and suicidal ideas.         Personal History     Past Medical History:   Diagnosis Date    CAD (coronary artery disease)     " Diabetes mellitus     GERD (gastroesophageal reflux disease)     History of cardiac cath     7/23/12 - LVEF 65%. 1+ MV insufficiency. Three vessel CAD. Three of four bypass grafts remain patent. The vein graft to the third marginal branch is a previously documented occlusion. The distal circumflex is unchanged from angiogram 5/4/09. 5/4/09 - LVEF 65%.Three vessel CAD s/p CABG. One fraft to marginal branch is occluded, the rest of the grafts are patent. The marginal branch is diffusely dis    History of echocardiogram     1/9/18 - LA mildly dilated. Normal LV function. Mild MR. Mild-moderate TR. Mild AR. 7/22/2012 - EF 60-65%. Moderate MI, TI, and PI. Mild AI.    Hyperlipidemia     Hypertension     Paroxysmal atrial fibrillation      Past Surgical History:   Procedure Laterality Date    CORONARY ARTERY BYPASS GRAFT  09/06/2005    CABG x4. Internal mammary artery to LAD, vein graft to diagonal artery, vein graft to OM, vein graft to posterior descending artery.     Family History   Problem Relation Age of Onset    Heart disease Mother      Social History     Tobacco Use    Smoking status: Never    Smokeless tobacco: Never   Vaping Use    Vaping status: Never Used   Substance Use Topics    Alcohol use: No    Drug use: Never     No current facility-administered medications on file prior to encounter.     Current Outpatient Medications on File Prior to Encounter   Medication Sig Dispense Refill    ACCU-CHEK SOFTCLIX LANCETS lancets 1 each by Other route Daily.      aspirin 81 MG EC tablet Take 1 tablet by mouth Daily.      atenolol (TENORMIN) 25 MG tablet Take 0.5 tablets by mouth Daily.      atorvastatin (LIPITOR) 20 MG tablet Take 1 tablet by mouth Daily.      Blood Glucose Monitoring Suppl (ACCU-CHEK GUIDE) w/Device kit 1 Device Daily.      Emollient (PETROLATUM 42 % EX OINT WRAPPER) Apply 1 application topically to the appropriate area as directed Every 12 (Twelve) Hours.      fexofenadine-pseudoephedrine (ALLEGRA-D)   MG per 12 hr tablet Take 1 tablet by mouth 2 (Two) Times a Day.      glipizide (GLUCOTROL XL) 5 MG ER tablet Take 1 tablet by mouth Daily.      glucose blood test strip 1 each by Other route Daily.      Jardiance 10 MG tablet tablet Take 2.5 tablets by mouth Daily.      lisinopril (PRINIVIL,ZESTRIL) 5 MG tablet Take 1 tablet by mouth Daily.      multivitamin with minerals (MULTIVITAMIN ADULT PO) Take 1 tablet by mouth Daily.      oxazepam (SERAX) 15 MG capsule Take 1 capsule by mouth Daily.      PARoxetine (PAXIL) 10 MG tablet Take 1 tablet by mouth Every Morning.      ranolazine (RANEXA) 500 MG 12 hr tablet Take 1 tablet by mouth 2 (Two) Times a Day. 180 tablet 3    vitamin C (ASCORBIC ACID) 500 MG tablet Take 1 tablet by mouth.      Zinc 50 MG capsule Take 1 capsule by mouth Daily.      [DISCONTINUED] esomeprazole (nexIUM) 20 MG capsule Take 1 capsule by mouth Every Morning Before Breakfast.      nitroglycerin (NITROSTAT) 0.4 MG SL tablet Place 1 tablet under the tongue Every 5 (Five) Minutes As Needed for Chest Pain. Take no more than 3 doses in 15 minutes.      [DISCONTINUED] cetirizine (zyrTEC) 10 MG tablet Take 1 tablet by mouth Daily.       No Known Allergies    Objective    Objective     Vital Signs  Temp:  [97.9 °F (36.6 °C)-98.1 °F (36.7 °C)] 97.9 °F (36.6 °C)  Heart Rate:  [70-82] 74  Resp:  [18-20] 20  BP: (120-140)/(44-70) 134/59  SpO2:  [89 %-99 %] 99 %  on  Flow (L/min):  [2-6] 2;   Device (Oxygen Therapy): nasal cannula  Body mass index is 29.58 kg/m².    Physical Exam  Constitutional:       General: He is not in acute distress.     Appearance: He is obese. He is ill-appearing.      Comments: Hard of hearing   Cardiovascular:      Rate and Rhythm: Normal rate and regular rhythm.   Pulmonary:      Effort: Pulmonary effort is normal. No respiratory distress.      Breath sounds: No stridor. No wheezing, rhonchi or rales.   Abdominal:      General: There is no distension.      Palpations: There is  no mass.      Tenderness: There is abdominal tenderness.   Musculoskeletal:      Right lower leg: No edema.      Left lower leg: No edema.      Comments: Knees nontender to palpation   Neurological:      Mental Status: He is alert.         Results Review:    I have personally reviewed:  [x]  Laboratory  [x]  Old records  [x]  Radiology   [x]  EKG/Telemetry   []  Microbiology    [x]  Cardiology/Vascular   []  Pathology     Lab Results (last 24 hours)       Procedure Component Value Units Date/Time    CBC & Differential [[id number]]  (Abnormal) Collected: 08/17/24 0821    Specimen: Blood Updated: 08/17/24 0839    Narrative:      The following orders were created for panel order CBC & Differential.  Procedure                               Abnormality         Status                     ---------                               -----------         ------                     CBC Auto Differential[[id number]]        Abnormal            Final result                 Please view results for these tests on the individual orders.    Comprehensive Metabolic Panel [[id number]]  (Abnormal) Collected: 08/17/24 0821    Specimen: Blood Updated: 08/17/24 0859     Glucose 137 mg/dL      BUN 34 mg/dL      Creatinine 1.60 mg/dL      Sodium 127 mmol/L      Potassium 4.7 mmol/L      Comment: Slight hemolysis detected by analyzer. Result may be falsely elevated.        Chloride 94 mmol/L      CO2 20.0 mmol/L      Calcium 8.9 mg/dL      Total Protein 6.5 g/dL      Albumin 3.6 g/dL      ALT (SGPT) 47 U/L      AST (SGOT) 64 U/L      Alkaline Phosphatase 80 U/L      Total Bilirubin 0.9 mg/dL      Globulin 2.9 gm/dL      A/G Ratio 1.2 g/dL      BUN/Creatinine Ratio 21.3     Anion Gap 13.0 mmol/L      eGFR 41.2 mL/min/1.73     Narrative:      GFR Normal >60  Chronic Kidney Disease <60  Kidney Failure <15    The GFR formula is only valid for adults with stable renal function between ages 18 and 70.    CBC Auto Differential [[id number]]  (Abnormal)  Collected: 08/17/24 0821    Specimen: Blood Updated: 08/17/24 0839     WBC 16.65 10*3/mm3      RBC 4.29 10*6/mm3      Hemoglobin 14.1 g/dL      Hematocrit 41.1 %      MCV 95.8 fL      MCH 32.9 pg      MCHC 34.3 g/dL      RDW 12.1 %      RDW-SD 42.7 fl      MPV 10.7 fL      Platelets 161 10*3/mm3      Neutrophil % 88.4 %      Lymphocyte % 2.8 %      Monocyte % 7.4 %      Eosinophil % 0.0 %      Basophil % 0.1 %      Immature Grans % 1.3 %      Neutrophils, Absolute 14.71 10*3/mm3      Lymphocytes, Absolute 0.46 10*3/mm3      Monocytes, Absolute 1.24 10*3/mm3      Eosinophils, Absolute 0.00 10*3/mm3      Basophils, Absolute 0.02 10*3/mm3      Immature Grans, Absolute 0.22 10*3/mm3      nRBC 0.0 /100 WBC     BNP [[id number]]  (Abnormal) Collected: 08/17/24 0821    Specimen: Blood Updated: 08/17/24 0925     proBNP 2,851.0 pg/mL     Narrative:      This assay is used as an aid in the diagnosis of individuals suspected of having heart failure. It can be used as an aid in the diagnosis of acute decompensated heart failure (ADHF) in patients presenting with signs and symptoms of ADHF to the emergency department (ED). In addition, NT-proBNP of <300 pg/mL indicates ADHF is not likely.    Age Range Result Interpretation  NT-proBNP Concentration (pg/mL:      <50             Positive            >450                   Gray                 300-450                    Negative             <300    50-75           Positive            >900                  Gray                300-900                  Negative            <300      >75             Positive            >1800                  Gray                300-1800                  Negative            <300    High Sensitivity Troponin T [[id number]]  (Abnormal) Collected: 08/17/24 0821    Specimen: Blood Updated: 08/17/24 0925     HS Troponin T 29 ng/L     Narrative:      High Sensitive Troponin T Reference Range:  <14.0 ng/L- Negative Female for AMI  <22.0 ng/L- Negative Male for  AMI  >=14 - Abnormal Female indicating possible myocardial injury.  >=22 - Abnormal Male indicating possible myocardial injury.   Clinicians would have to utilize clinical acumen, EKG, Troponin, and serial changes to determine if it is an Acute Myocardial Infarction or myocardial injury due to an underlying chronic condition.         Respiratory Panel PCR w/COVID-19(SARS-CoV-2) VIKTOR/MICHELLE/GATO/PAD/COR/JANESSA In-House, NP Swab in UTM/VTM, 2 HR TAT - Swab, Nasopharynx [[id number]]  (Normal) Collected: 08/17/24 0846    Specimen: Swab from Nasopharynx Updated: 08/17/24 1022     ADENOVIRUS, PCR Not Detected     Coronavirus 229E Not Detected     Coronavirus HKU1 Not Detected     Coronavirus NL63 Not Detected     Coronavirus OC43 Not Detected     COVID19 Not Detected     Human Metapneumovirus Not Detected     Human Rhinovirus/Enterovirus Not Detected     Influenza A PCR Not Detected     Influenza B PCR Not Detected     Parainfluenza Virus 1 Not Detected     Parainfluenza Virus 2 Not Detected     Parainfluenza Virus 3 Not Detected     Parainfluenza Virus 4 Not Detected     RSV, PCR Not Detected     Bordetella pertussis pcr Not Detected     Bordetella parapertussis PCR Not Detected     Chlamydophila pneumoniae PCR Not Detected     Mycoplasma pneumo by PCR Not Detected    Narrative:      In the setting of a positive respiratory panel with a viral infection PLUS a negative procalcitonin without other underlying concern for bacterial infection, consider observing off antibiotics or discontinuation of antibiotics and continue supportive care. If the respiratory panel is positive for atypical bacterial infection (Bordetella pertussis, Chlamydophila pneumoniae, or Mycoplasma pneumoniae), consider antibiotic de-escalation to target atypical bacterial infection.    Lactic Acid, Plasma [[id number]]  (Normal) Collected: 08/17/24 0952    Specimen: Blood from Arm, Right Updated: 08/17/24 1017     Lactate 1.9 mmol/L     Blood Culture - Blood,  Arm, Right [[id number]] Collected: 08/17/24 0952    Specimen: Blood from Arm, Right Updated: 08/17/24 1000    Blood Culture - Blood, Arm, Left [[id number]] Collected: 08/17/24 0953    Specimen: Blood from Arm, Left Updated: 08/17/24 1000    High Sensitivity Troponin T 2Hr [[id number]]  (Abnormal) Collected: 08/17/24 1144    Specimen: Blood Updated: 08/17/24 1251     HS Troponin T 37 ng/L      Troponin T Delta 8 ng/L     Narrative:      High Sensitive Troponin T Reference Range:  <14.0 ng/L- Negative Female for AMI  <22.0 ng/L- Negative Male for AMI  >=14 - Abnormal Female indicating possible myocardial injury.  >=22 - Abnormal Male indicating possible myocardial injury.   Clinicians would have to utilize clinical acumen, EKG, Troponin, and serial changes to determine if it is an Acute Myocardial Infarction or myocardial injury due to an underlying chronic condition.         POC Glucose Once [[id number]]  (Normal) Collected: 08/17/24 1226    Specimen: Blood Updated: 08/17/24 1227     Glucose 112 mg/dL             Imaging Results (Last 24 Hours)       Procedure Component Value Units Date/Time    CT Angiogram Chest [[id number]] Collected: 08/17/24 0929     Updated: 08/17/24 0937    Narrative:      CT ANGIOGRAPHY OF THE CHEST WITH INTRAVENOUS CONTRAST AND 3D  RECONSTRUCTIONS     HISTORY: Shortness of breath. Upper abdominal pain.     The CT scan was performed as an emergency procedure with intravenous  contrast using CT angiography protocol and 3D reconstructions. The  following findings are present:     1. The pulmonary arteries are well-opacified and there is no evidence of  pulmonary embolus. The thoracic aorta shows no evidence of aneurysm or  dissection.     2. There is a very small right pleural effusion with adjacent  compressive atelectasis and pleural reaction. There is also some minimal  atelectasis at the left base. The lungs are otherwise clear.     3. There is no mediastinal or hilar or axillary adenopathy.  There is  prominent coronary artery calcification. There is no pericardial  effusion.     4. The CT images through the upper abdomen demonstrate multiple  gallstones within the gallbladder with surrounding haziness and  inflammatory change highly suspicious for acute cholecystitis. Further  evaluation with ultrasound may be helpful. The visualized liver and  spleen and both adrenal glands are unremarkable. There is a large right  peripelvic renal cyst measuring 5.5 cm.              Radiation dose reduction techniques were utilized, including automated  exposure control and exposure modulation based on body size.        This report was finalized on 8/17/2024 9:34 AM by Dr. Trevor Jenkins M.D  on Workstation: [id number]       XR Chest 1 View [[id number]] Collected: 08/17/24 0922     Updated: 08/17/24 0928    Narrative:      ONE-VIEW PORTABLE CHEST     HISTORY: Multiple falls. Chest pain.     FINDINGS: The lungs are moderately expanded and clear. The heart is top  normal in size with sternal wires from previous cardiac surgery. Other  than decreased lung volumes, there is no change from previous chest  x-ray dated 1/13/2020.     2 VIEW RIGHT KNEE     HISTORY: Fell. Knee pain.     FINDINGS: There is very prominent meniscal calcification without  significant joint space narrowing and this may relate to pseudogout and  clinical correlation is recommended. There is no evidence of fracture or  joint effusion.        This report was finalized on 8/17/2024 9:25 AM by Dr. Trevor Jenkins M.D  on Workstation: [id number]       XR Knee 1 or 2 View Right [[id number]] Collected: 08/17/24 0922     Updated: 08/17/24 0928    Narrative:      ONE-VIEW PORTABLE CHEST     HISTORY: Multiple falls. Chest pain.     FINDINGS: The lungs are moderately expanded and clear. The heart is top  normal in size with sternal wires from previous cardiac surgery. Other  than decreased lung volumes, there is no change from previous chest  x-ray dated  1/13/2020.     2 VIEW RIGHT KNEE     HISTORY: Fell. Knee pain.     FINDINGS: There is very prominent meniscal calcification without  significant joint space narrowing and this may relate to pseudogout and  clinical correlation is recommended. There is no evidence of fracture or  joint effusion.        This report was finalized on 8/17/2024 9:25 AM by Dr. Trevor Jenkins M.D  on Workstation: [id number]               Results for orders placed during the hospital encounter of 10/03/22    Adult Transthoracic Echo Complete W/ Color, Spectral and Contrast if Necessary Per Protocol    Interpretation Summary  · Calculated left ventricular EF = 62.2% Estimated left ventricular EF was in agreement with the calculated left ventricular EF. Left ventricular systolic function is normal.  · Left ventricular diastolic function is consistent with (grade I) impaired relaxation.  · There is calcification of the aortic valve mainly affecting the non-coronary, left coronary and right coronary cusp(s).  · The mean gradient across aortic valve is 7.7 mmHg  · Mild aortic valve regurgitation is present.  · Moderate tricuspid valve regurgitation is present.  · Estimated right ventricular systolic pressure from tricuspid regurgitation is mildly elevated (35-45 mmHg).  · Mild mitral valve regurgitation is present.      ECG 12 Lead Dyspnea   Final Result   HEART RATE=72  bpm   RR Gtpiovst=679  ms   NM Iiomoben=014  ms   P Horizontal Axis=16  deg   P Front Axis=38  deg   QRSD Ctkerbuv=749  ms   QT Rcngfsjn=768  ms   ZXaA=221  ms   QRS Axis=-12  deg   T Wave Axis=23  deg   - OTHERWISE NORMAL ECG -   Sinus rhythm   Minimal ST depression, lateral leads   No Prior Tracing for Comparison   Electronically Signed By: Mohamud Bowman ([facility]) 2024-08-17 11:00:43   Date and Time of Study:2024-08-17 08:51:18      Telemetry Scan   Final Result      Telemetry Scan   Final Result      Telemetry Scan   Final Result           Assessment/Plan     Active Hospital  Problems    Diagnosis  POA    **Acute cholecystitis [K81.0]  Yes    ARF (acute renal failure) [N17.9]  Yes    Hyponatremia [E87.1]  Yes    Leukocytosis [D72.829]  Yes    Type 2 diabetes mellitus, without long-term current use of insulin [E11.9]  Yes    PAF (paroxysmal atrial fibrillation) [I48.0]  Yes    History of coronary artery bypass surgery [Z95.1]  Not Applicable    Hyperlipidemia [E78.5]  Yes    Hypertension [I10]  Yes    CAD (coronary artery disease) [I25.10]  Yes      Resolved Hospital Problems   No resolved problems to display.       Mr. Saini is a 89 y.o. male with CAD status post CABG, HTN, DM2, PAF presenting with dyspnea and abdominal pain.    Acute cholecystitis  Leukocytosis  -CTA without PE; multiple gallstones with inflammatory change suspicious for acute cholecystitis  -General Surgery consult  -Empiric Zosyn    TOM  -Crt 1.6 OA (b/l ~1)  -likely prerenal   -Received fluids in ED, hold for now pending TTE    DM2 w/ hyperglycemia  -hold Jardiance  -SSI, monitor BG    Hyponatremia  -Na 127  -suspect hypovolemic, received fluids in ED, recheck in am    CAD s/p CABG  HTN  Dyspnea  Elevated troponin  -trop 29 > 37  -EKG w/out acute ischemia  -TTE pending  -Cardiology following    R knee 'giving out'  -X-ray shows possible pseudogout, he does not any tenderness and denies pain  -Reassess postop with PT    I discussed the patient's findings and my recommendations with patient and ED provider.    VTE Prophylaxis - SCDs.  Code Status - Full code.       Ryan Moreland MD  [facility]  08/17/24  15:29 EDT

## 2024-08-18 ENCOUNTER — ANESTHESIA (OUTPATIENT)
Dept: PERIOP | Facility: HOSPITAL | Age: 89
End: 2024-08-18
Payer: MEDICARE

## 2024-08-18 ENCOUNTER — ANESTHESIA EVENT (OUTPATIENT)
Dept: PERIOP | Facility: HOSPITAL | Age: 89
End: 2024-08-18
Payer: MEDICARE

## 2024-08-18 ENCOUNTER — APPOINTMENT (OUTPATIENT)
Dept: GENERAL RADIOLOGY | Facility: HOSPITAL | Age: 89
DRG: 417 | End: 2024-08-18
Payer: MEDICARE

## 2024-08-18 LAB
ALBUMIN SERPL-MCNC: 3.1 G/DL (ref 3.5–5.2)
ALBUMIN/GLOB SERPL: 1 G/DL
ALP SERPL-CCNC: 92 U/L (ref 39–117)
ALT SERPL W P-5'-P-CCNC: 47 U/L (ref 1–41)
ANION GAP SERPL CALCULATED.3IONS-SCNC: 14 MMOL/L (ref 5–15)
AST SERPL-CCNC: 73 U/L (ref 1–40)
BASOPHILS # BLD AUTO: 0.03 10*3/MM3 (ref 0–0.2)
BASOPHILS NFR BLD AUTO: 0.2 % (ref 0–1.5)
BILIRUB SERPL-MCNC: 0.9 MG/DL (ref 0–1.2)
BUN SERPL-MCNC: 32 MG/DL (ref 8–23)
BUN/CREAT SERPL: 25.4 (ref 7–25)
CALCIUM SPEC-SCNC: 8.8 MG/DL (ref 8.6–10.5)
CHLORIDE SERPL-SCNC: 97 MMOL/L (ref 98–107)
CO2 SERPL-SCNC: 18 MMOL/L (ref 22–29)
CREAT SERPL-MCNC: 1.26 MG/DL (ref 0.76–1.27)
DEPRECATED RDW RBC AUTO: 43.8 FL (ref 37–54)
EGFRCR SERPLBLD CKD-EPI 2021: 54.9 ML/MIN/1.73
EOSINOPHIL # BLD AUTO: 0 10*3/MM3 (ref 0–0.4)
EOSINOPHIL NFR BLD AUTO: 0 % (ref 0.3–6.2)
ERYTHROCYTE [DISTWIDTH] IN BLOOD BY AUTOMATED COUNT: 12.4 % (ref 12.3–15.4)
GLOBULIN UR ELPH-MCNC: 3.1 GM/DL
GLUCOSE BLDC GLUCOMTR-MCNC: 103 MG/DL (ref 70–130)
GLUCOSE BLDC GLUCOMTR-MCNC: 118 MG/DL (ref 70–130)
GLUCOSE BLDC GLUCOMTR-MCNC: 164 MG/DL (ref 70–130)
GLUCOSE BLDC GLUCOMTR-MCNC: 227 MG/DL (ref 70–130)
GLUCOSE SERPL-MCNC: 75 MG/DL (ref 65–99)
HBA1C MFR BLD: 6.7 % (ref 4.8–5.6)
HCT VFR BLD AUTO: 43.8 % (ref 37.5–51)
HGB BLD-MCNC: 14.9 G/DL (ref 13–17.7)
IMM GRANULOCYTES # BLD AUTO: 0.15 10*3/MM3 (ref 0–0.05)
IMM GRANULOCYTES NFR BLD AUTO: 1.1 % (ref 0–0.5)
LYMPHOCYTES # BLD AUTO: 0.45 10*3/MM3 (ref 0.7–3.1)
LYMPHOCYTES NFR BLD AUTO: 3.2 % (ref 19.6–45.3)
MAGNESIUM SERPL-MCNC: 2.5 MG/DL (ref 1.6–2.4)
MCH RBC QN AUTO: 33.1 PG (ref 26.6–33)
MCHC RBC AUTO-ENTMCNC: 34 G/DL (ref 31.5–35.7)
MCV RBC AUTO: 97.3 FL (ref 79–97)
MONOCYTES # BLD AUTO: 1.62 10*3/MM3 (ref 0.1–0.9)
MONOCYTES NFR BLD AUTO: 11.4 % (ref 5–12)
NEUTROPHILS NFR BLD AUTO: 11.95 10*3/MM3 (ref 1.7–7)
NEUTROPHILS NFR BLD AUTO: 84.1 % (ref 42.7–76)
NRBC BLD AUTO-RTO: 0 /100 WBC (ref 0–0.2)
PHOSPHATE SERPL-MCNC: 2.8 MG/DL (ref 2.5–4.5)
PLATELET # BLD AUTO: 165 10*3/MM3 (ref 140–450)
PMV BLD AUTO: 10.6 FL (ref 6–12)
POTASSIUM SERPL-SCNC: 4 MMOL/L (ref 3.5–5.2)
PROT SERPL-MCNC: 6.2 G/DL (ref 6–8.5)
RBC # BLD AUTO: 4.5 10*6/MM3 (ref 4.14–5.8)
SODIUM SERPL-SCNC: 129 MMOL/L (ref 136–145)
WBC NRBC COR # BLD AUTO: 14.2 10*3/MM3 (ref 3.4–10.8)

## 2024-08-18 PROCEDURE — 25010000002 SUGAMMADEX 200 MG/2ML SOLUTION: Performed by: NURSE ANESTHETIST, CERTIFIED REGISTERED

## 2024-08-18 PROCEDURE — 0FT44ZZ RESECTION OF GALLBLADDER, PERCUTANEOUS ENDOSCOPIC APPROACH: ICD-10-PCS | Performed by: STUDENT IN AN ORGANIZED HEALTH CARE EDUCATION/TRAINING PROGRAM

## 2024-08-18 PROCEDURE — 25010000002 LABETALOL 5 MG/ML SOLUTION: Performed by: NURSE ANESTHETIST, CERTIFIED REGISTERED

## 2024-08-18 PROCEDURE — 25010000002 PROPOFOL 10 MG/ML EMULSION: Performed by: NURSE ANESTHETIST, CERTIFIED REGISTERED

## 2024-08-18 PROCEDURE — BF131ZZ FLUOROSCOPY OF GALLBLADDER AND BILE DUCTS USING LOW OSMOLAR CONTRAST: ICD-10-PCS | Performed by: STUDENT IN AN ORGANIZED HEALTH CARE EDUCATION/TRAINING PROGRAM

## 2024-08-18 PROCEDURE — 82948 REAGENT STRIP/BLOOD GLUCOSE: CPT

## 2024-08-18 PROCEDURE — 25810000003 SODIUM CHLORIDE PER 500 ML: Performed by: STUDENT IN AN ORGANIZED HEALTH CARE EDUCATION/TRAINING PROGRAM

## 2024-08-18 PROCEDURE — 99233 SBSQ HOSP IP/OBS HIGH 50: CPT | Performed by: STUDENT IN AN ORGANIZED HEALTH CARE EDUCATION/TRAINING PROGRAM

## 2024-08-18 PROCEDURE — 83036 HEMOGLOBIN GLYCOSYLATED A1C: CPT | Performed by: STUDENT IN AN ORGANIZED HEALTH CARE EDUCATION/TRAINING PROGRAM

## 2024-08-18 PROCEDURE — 25010000002 PIPERACILLIN SOD-TAZOBACTAM PER 1 G: Performed by: STUDENT IN AN ORGANIZED HEALTH CARE EDUCATION/TRAINING PROGRAM

## 2024-08-18 PROCEDURE — 80053 COMPREHEN METABOLIC PANEL: CPT | Performed by: STUDENT IN AN ORGANIZED HEALTH CARE EDUCATION/TRAINING PROGRAM

## 2024-08-18 PROCEDURE — 47563 LAPARO CHOLECYSTECTOMY/GRAPH: CPT | Performed by: STUDENT IN AN ORGANIZED HEALTH CARE EDUCATION/TRAINING PROGRAM

## 2024-08-18 PROCEDURE — 25010000002 ONDANSETRON PER 1 MG: Performed by: STUDENT IN AN ORGANIZED HEALTH CARE EDUCATION/TRAINING PROGRAM

## 2024-08-18 PROCEDURE — 74300 X-RAY BILE DUCTS/PANCREAS: CPT

## 2024-08-18 PROCEDURE — 88304 TISSUE EXAM BY PATHOLOGIST: CPT | Performed by: STUDENT IN AN ORGANIZED HEALTH CARE EDUCATION/TRAINING PROGRAM

## 2024-08-18 PROCEDURE — 63710000001 INSULIN LISPRO (HUMAN) PER 5 UNITS: Performed by: STUDENT IN AN ORGANIZED HEALTH CARE EDUCATION/TRAINING PROGRAM

## 2024-08-18 PROCEDURE — 25810000003 LACTATED RINGERS PER 1000 ML: Performed by: STUDENT IN AN ORGANIZED HEALTH CARE EDUCATION/TRAINING PROGRAM

## 2024-08-18 PROCEDURE — 0 IOTHALAMATE 60 % SOLUTION: Performed by: STUDENT IN AN ORGANIZED HEALTH CARE EDUCATION/TRAINING PROGRAM

## 2024-08-18 PROCEDURE — 83735 ASSAY OF MAGNESIUM: CPT | Performed by: STUDENT IN AN ORGANIZED HEALTH CARE EDUCATION/TRAINING PROGRAM

## 2024-08-18 PROCEDURE — 85025 COMPLETE CBC W/AUTO DIFF WBC: CPT | Performed by: STUDENT IN AN ORGANIZED HEALTH CARE EDUCATION/TRAINING PROGRAM

## 2024-08-18 PROCEDURE — 25010000002 DEXAMETHASONE PER 1 MG: Performed by: NURSE ANESTHETIST, CERTIFIED REGISTERED

## 2024-08-18 PROCEDURE — 84100 ASSAY OF PHOSPHORUS: CPT | Performed by: STUDENT IN AN ORGANIZED HEALTH CARE EDUCATION/TRAINING PROGRAM

## 2024-08-18 PROCEDURE — 25010000002 FENTANYL CITRATE (PF) 50 MCG/ML SOLUTION: Performed by: STUDENT IN AN ORGANIZED HEALTH CARE EDUCATION/TRAINING PROGRAM

## 2024-08-18 PROCEDURE — 99232 SBSQ HOSP IP/OBS MODERATE 35: CPT | Performed by: INTERNAL MEDICINE

## 2024-08-18 DEVICE — ARISTA AH ABSORBABLE HEMOSTATIC PARTICLES
Type: IMPLANTABLE DEVICE | Site: ABDOMEN | Status: FUNCTIONAL
Brand: ARISTA™ AH

## 2024-08-18 DEVICE — ABSORBABLE HEMOSTAT (OXIDIZED REGENERATED CELLULOSE, U.S.P.)
Type: IMPLANTABLE DEVICE | Site: ABDOMEN | Status: FUNCTIONAL
Brand: SURGICEL

## 2024-08-18 DEVICE — ABSORBABLE HEMOSTAT (OXIDIZED REGENERATED CELLULOSE)
Type: IMPLANTABLE DEVICE | Site: ABDOMEN | Status: FUNCTIONAL
Brand: SURGICEL

## 2024-08-18 RX ORDER — SODIUM CHLORIDE 0.9 % (FLUSH) 0.9 %
3-10 SYRINGE (ML) INJECTION AS NEEDED
Status: DISCONTINUED | OUTPATIENT
Start: 2024-08-18 | End: 2024-08-18 | Stop reason: HOSPADM

## 2024-08-18 RX ORDER — MAGNESIUM HYDROXIDE 1200 MG/15ML
LIQUID ORAL AS NEEDED
Status: DISCONTINUED | OUTPATIENT
Start: 2024-08-18 | End: 2024-08-18 | Stop reason: HOSPADM

## 2024-08-18 RX ORDER — SODIUM CHLORIDE 0.9 % (FLUSH) 0.9 %
3 SYRINGE (ML) INJECTION EVERY 12 HOURS SCHEDULED
Status: DISCONTINUED | OUTPATIENT
Start: 2024-08-18 | End: 2024-08-18 | Stop reason: HOSPADM

## 2024-08-18 RX ORDER — PROMETHAZINE HYDROCHLORIDE 25 MG/1
25 TABLET ORAL ONCE AS NEEDED
Status: DISCONTINUED | OUTPATIENT
Start: 2024-08-18 | End: 2024-08-18 | Stop reason: HOSPADM

## 2024-08-18 RX ORDER — LABETALOL HYDROCHLORIDE 5 MG/ML
INJECTION, SOLUTION INTRAVENOUS AS NEEDED
Status: DISCONTINUED | OUTPATIENT
Start: 2024-08-18 | End: 2024-08-18 | Stop reason: SURG

## 2024-08-18 RX ORDER — FAMOTIDINE 10 MG/ML
20 INJECTION, SOLUTION INTRAVENOUS ONCE
Status: COMPLETED | OUTPATIENT
Start: 2024-08-18 | End: 2024-08-18

## 2024-08-18 RX ORDER — HYDROCODONE BITARTRATE AND ACETAMINOPHEN 7.5; 325 MG/1; MG/1
1 TABLET ORAL EVERY 4 HOURS PRN
Status: DISCONTINUED | OUTPATIENT
Start: 2024-08-18 | End: 2024-08-18 | Stop reason: HOSPADM

## 2024-08-18 RX ORDER — BUPIVACAINE HYDROCHLORIDE AND EPINEPHRINE 2.5; 5 MG/ML; UG/ML
INJECTION, SOLUTION INFILTRATION; PERINEURAL AS NEEDED
Status: DISCONTINUED | OUTPATIENT
Start: 2024-08-18 | End: 2024-08-18 | Stop reason: HOSPADM

## 2024-08-18 RX ORDER — SODIUM CHLORIDE, SODIUM LACTATE, POTASSIUM CHLORIDE, CALCIUM CHLORIDE 600; 310; 30; 20 MG/100ML; MG/100ML; MG/100ML; MG/100ML
9 INJECTION, SOLUTION INTRAVENOUS CONTINUOUS
Status: DISCONTINUED | OUTPATIENT
Start: 2024-08-18 | End: 2024-08-18

## 2024-08-18 RX ORDER — EPHEDRINE SULFATE 50 MG/ML
5 INJECTION, SOLUTION INTRAVENOUS ONCE AS NEEDED
Status: DISCONTINUED | OUTPATIENT
Start: 2024-08-18 | End: 2024-08-18 | Stop reason: HOSPADM

## 2024-08-18 RX ORDER — LABETALOL HYDROCHLORIDE 5 MG/ML
5 INJECTION, SOLUTION INTRAVENOUS
Status: DISCONTINUED | OUTPATIENT
Start: 2024-08-18 | End: 2024-08-18 | Stop reason: HOSPADM

## 2024-08-18 RX ORDER — ONDANSETRON 2 MG/ML
4 INJECTION INTRAMUSCULAR; INTRAVENOUS ONCE AS NEEDED
Status: DISCONTINUED | OUTPATIENT
Start: 2024-08-18 | End: 2024-08-18 | Stop reason: HOSPADM

## 2024-08-18 RX ORDER — DIPHENHYDRAMINE HYDROCHLORIDE 50 MG/ML
12.5 INJECTION INTRAMUSCULAR; INTRAVENOUS
Status: DISCONTINUED | OUTPATIENT
Start: 2024-08-18 | End: 2024-08-18 | Stop reason: HOSPADM

## 2024-08-18 RX ORDER — ROCURONIUM BROMIDE 10 MG/ML
INJECTION, SOLUTION INTRAVENOUS AS NEEDED
Status: DISCONTINUED | OUTPATIENT
Start: 2024-08-18 | End: 2024-08-18 | Stop reason: SURG

## 2024-08-18 RX ORDER — NALOXONE HCL 0.4 MG/ML
0.2 VIAL (ML) INJECTION AS NEEDED
Status: DISCONTINUED | OUTPATIENT
Start: 2024-08-18 | End: 2024-08-18 | Stop reason: HOSPADM

## 2024-08-18 RX ORDER — PROPOFOL 10 MG/ML
VIAL (ML) INTRAVENOUS AS NEEDED
Status: DISCONTINUED | OUTPATIENT
Start: 2024-08-18 | End: 2024-08-18 | Stop reason: SURG

## 2024-08-18 RX ORDER — PROMETHAZINE HYDROCHLORIDE 25 MG/1
25 SUPPOSITORY RECTAL ONCE AS NEEDED
Status: DISCONTINUED | OUTPATIENT
Start: 2024-08-18 | End: 2024-08-18 | Stop reason: HOSPADM

## 2024-08-18 RX ORDER — LIDOCAINE HYDROCHLORIDE 20 MG/ML
INJECTION, SOLUTION INFILTRATION; PERINEURAL AS NEEDED
Status: DISCONTINUED | OUTPATIENT
Start: 2024-08-18 | End: 2024-08-18 | Stop reason: SURG

## 2024-08-18 RX ORDER — HYDROCODONE BITARTRATE AND ACETAMINOPHEN 5; 325 MG/1; MG/1
1 TABLET ORAL ONCE AS NEEDED
Status: DISCONTINUED | OUTPATIENT
Start: 2024-08-18 | End: 2024-08-18 | Stop reason: HOSPADM

## 2024-08-18 RX ORDER — HYDROCODONE BITARTRATE AND ACETAMINOPHEN 5; 325 MG/1; MG/1
1 TABLET ORAL EVERY 4 HOURS PRN
Status: DISCONTINUED | OUTPATIENT
Start: 2024-08-18 | End: 2024-08-21 | Stop reason: HOSPADM

## 2024-08-18 RX ORDER — HYDROMORPHONE HYDROCHLORIDE 1 MG/ML
0.25 INJECTION, SOLUTION INTRAMUSCULAR; INTRAVENOUS; SUBCUTANEOUS
Status: DISCONTINUED | OUTPATIENT
Start: 2024-08-18 | End: 2024-08-18 | Stop reason: HOSPADM

## 2024-08-18 RX ORDER — SODIUM CHLORIDE 9 MG/ML
INJECTION, SOLUTION INTRAVENOUS AS NEEDED
Status: DISCONTINUED | OUTPATIENT
Start: 2024-08-18 | End: 2024-08-18 | Stop reason: HOSPADM

## 2024-08-18 RX ORDER — FLUMAZENIL 0.1 MG/ML
0.2 INJECTION INTRAVENOUS AS NEEDED
Status: DISCONTINUED | OUTPATIENT
Start: 2024-08-18 | End: 2024-08-18 | Stop reason: HOSPADM

## 2024-08-18 RX ORDER — DROPERIDOL 2.5 MG/ML
0.62 INJECTION, SOLUTION INTRAMUSCULAR; INTRAVENOUS
Status: DISCONTINUED | OUTPATIENT
Start: 2024-08-18 | End: 2024-08-18 | Stop reason: HOSPADM

## 2024-08-18 RX ORDER — FENTANYL CITRATE 50 UG/ML
25 INJECTION, SOLUTION INTRAMUSCULAR; INTRAVENOUS
Status: DISCONTINUED | OUTPATIENT
Start: 2024-08-18 | End: 2024-08-18 | Stop reason: HOSPADM

## 2024-08-18 RX ORDER — IPRATROPIUM BROMIDE AND ALBUTEROL SULFATE 2.5; .5 MG/3ML; MG/3ML
3 SOLUTION RESPIRATORY (INHALATION) ONCE AS NEEDED
Status: DISCONTINUED | OUTPATIENT
Start: 2024-08-18 | End: 2024-08-18 | Stop reason: HOSPADM

## 2024-08-18 RX ORDER — LIDOCAINE HYDROCHLORIDE 10 MG/ML
0.5 INJECTION, SOLUTION INFILTRATION; PERINEURAL ONCE AS NEEDED
Status: DISCONTINUED | OUTPATIENT
Start: 2024-08-18 | End: 2024-08-18 | Stop reason: HOSPADM

## 2024-08-18 RX ORDER — DEXAMETHASONE SODIUM PHOSPHATE 4 MG/ML
INJECTION, SOLUTION INTRA-ARTICULAR; INTRALESIONAL; INTRAMUSCULAR; INTRAVENOUS; SOFT TISSUE AS NEEDED
Status: DISCONTINUED | OUTPATIENT
Start: 2024-08-18 | End: 2024-08-18 | Stop reason: SURG

## 2024-08-18 RX ORDER — FENTANYL CITRATE 50 UG/ML
50 INJECTION, SOLUTION INTRAMUSCULAR; INTRAVENOUS ONCE AS NEEDED
Status: COMPLETED | OUTPATIENT
Start: 2024-08-18 | End: 2024-08-18

## 2024-08-18 RX ORDER — HYDRALAZINE HYDROCHLORIDE 20 MG/ML
5 INJECTION INTRAMUSCULAR; INTRAVENOUS
Status: DISCONTINUED | OUTPATIENT
Start: 2024-08-18 | End: 2024-08-18 | Stop reason: HOSPADM

## 2024-08-18 RX ADMIN — PIPERACILLIN AND TAZOBACTAM 3.38 G: 3; .375 INJECTION, POWDER, FOR SOLUTION INTRAVENOUS at 03:23

## 2024-08-18 RX ADMIN — RANOLAZINE 500 MG: 500 TABLET, FILM COATED, EXTENDED RELEASE ORAL at 08:34

## 2024-08-18 RX ADMIN — Medication 1 TABLET: at 08:34

## 2024-08-18 RX ADMIN — FAMOTIDINE 20 MG: 10 INJECTION INTRAVENOUS at 10:16

## 2024-08-18 RX ADMIN — SODIUM CHLORIDE, POTASSIUM CHLORIDE, SODIUM LACTATE AND CALCIUM CHLORIDE 9 ML/HR: 600; 310; 30; 20 INJECTION, SOLUTION INTRAVENOUS at 10:16

## 2024-08-18 RX ADMIN — RANOLAZINE 500 MG: 500 TABLET, FILM COATED, EXTENDED RELEASE ORAL at 21:56

## 2024-08-18 RX ADMIN — LIDOCAINE HYDROCHLORIDE 100 MG: 20 INJECTION, SOLUTION INFILTRATION; PERINEURAL at 10:31

## 2024-08-18 RX ADMIN — FENTANYL CITRATE 50 MCG: 50 INJECTION, SOLUTION INTRAMUSCULAR; INTRAVENOUS at 11:05

## 2024-08-18 RX ADMIN — INSULIN LISPRO 4 UNITS: 100 INJECTION, SOLUTION INTRAVENOUS; SUBCUTANEOUS at 21:56

## 2024-08-18 RX ADMIN — ONDANSETRON 4 MG: 2 INJECTION INTRAMUSCULAR; INTRAVENOUS at 12:04

## 2024-08-18 RX ADMIN — PIPERACILLIN AND TAZOBACTAM 3.38 G: 3; .375 INJECTION, POWDER, FOR SOLUTION INTRAVENOUS at 10:43

## 2024-08-18 RX ADMIN — FENTANYL CITRATE 50 MCG: 50 INJECTION, SOLUTION INTRAMUSCULAR; INTRAVENOUS at 10:51

## 2024-08-18 RX ADMIN — ATENOLOL 12.5 MG: 25 TABLET ORAL at 08:34

## 2024-08-18 RX ADMIN — LABETALOL HYDROCHLORIDE 5 MG: 5 INJECTION, SOLUTION INTRAVENOUS at 11:20

## 2024-08-18 RX ADMIN — ROCURONIUM BROMIDE 40 MG: 10 INJECTION, SOLUTION INTRAVENOUS at 10:33

## 2024-08-18 RX ADMIN — SUGAMMADEX 200 MG: 100 INJECTION, SOLUTION INTRAVENOUS at 12:15

## 2024-08-18 RX ADMIN — PROPOFOL 100 MG: 10 INJECTION, EMULSION INTRAVENOUS at 10:31

## 2024-08-18 RX ADMIN — PIPERACILLIN AND TAZOBACTAM 3.38 G: 3; .375 INJECTION, POWDER, FOR SOLUTION INTRAVENOUS at 17:21

## 2024-08-18 RX ADMIN — OXAZEPAM 15 MG: 15 CAPSULE, GELATIN COATED ORAL at 08:34

## 2024-08-18 RX ADMIN — INSULIN LISPRO 2 UNITS: 100 INJECTION, SOLUTION INTRAVENOUS; SUBCUTANEOUS at 17:21

## 2024-08-18 RX ADMIN — ROCURONIUM BROMIDE 10 MG: 10 INJECTION, SOLUTION INTRAVENOUS at 10:52

## 2024-08-18 RX ADMIN — OXYCODONE HYDROCHLORIDE AND ACETAMINOPHEN 500 MG: 500 TABLET ORAL at 08:34

## 2024-08-18 RX ADMIN — DEXAMETHASONE SODIUM PHOSPHATE 10 MG: 4 INJECTION, SOLUTION INTRA-ARTICULAR; INTRALESIONAL; INTRAMUSCULAR; INTRAVENOUS; SOFT TISSUE at 10:37

## 2024-08-18 NOTE — PROGRESS NOTES
"General Surgery Progress Note    CC: Acute cholecystitis    S: Patient states his abdominal pain is better today.  He also feels like he is breathing better.  He says he thinks his medications are working.  He denies nausea or vomiting.    O:/56 (BP Location: Left arm, Patient Position: Lying)   Pulse 79   Temp 98 °F (36.7 °C) (Oral)   Resp 18   Ht 177 cm (69.69\")   Wt 92.7 kg (204 lb 4.8 oz)   SpO2 100%   BMI 29.58 kg/m²     Intake & Output (last day)         08/17 0701 08/18 0700 08/18 0701 08/19 0700    P.O. 240     Total Intake(mL/kg) 240 (2.6)     Urine (mL/kg/hr) 1150     Total Output 1150     Net -910           Urine Unmeasured Occurrence 0 x             GENERAL: awake, alert, interactive, cooperative  HEENT: EOMI, clear sclera, moist mucus membranes   CHEST: Unlabored breathing   CARDIAC: well perfused, trace bilateral pedal edema  GI: Abd soft, protuberant, mildly tender in the right upper quadrant, no rebound or guarding  : No Zuleta  EXTREMITIES: RIOS, no cyanosis     SKIN: Warm and dry, no rash    LABS  Results from last 7 days   Lab Units 08/18/24  0255 08/17/24  0821   WBC 10*3/mm3 14.20* 16.65*   HEMOGLOBIN g/dL 14.9 14.1   HEMATOCRIT % 43.8 41.1   PLATELETS 10*3/mm3 165 161     Results from last 7 days   Lab Units 08/18/24  0255 08/17/24  1717 08/17/24  0821   SODIUM mmol/L 129* 130* 127*   POTASSIUM mmol/L 4.0 4.3 4.7   CHLORIDE mmol/L 97* 96* 94*   CO2 mmol/L 18.0* 22.0 20.0*   BUN mg/dL 32* 33* 34*   CREATININE mg/dL 1.26 1.51* 1.60*   CALCIUM mg/dL 8.8 9.1 8.9   BILIRUBIN mg/dL 0.9  --  0.9   ALK PHOS U/L 92  --  80   ALT (SGPT) U/L 47*  --  47*   AST (SGOT) U/L 73*  --  64*   GLUCOSE mg/dL 75 88 137*           IMAGING:  Echo 8/17/24 report reviewed   :   Left ventricular systolic function is normal. Left ventricular ejection fraction appears to be 61 - 65%.    Left ventricular diastolic function was normal.    Mild aortic valve stenosis is present.  Mild aortic regurgitation.    " Peak velocity of the flow distal to the aortic valve is 241 cm/s. Aortic valve maximum pressure gradient is 23 mmHg. Aortic valve mean pressure gradient is 13 mmHg. Aortic valve dimensionless index is 0.5 .    Estimated right ventricular systolic pressure from tricuspid regurgitation is mildly elevated (35-45 mmHg). Calculated right ventricular systolic pressure from tricuspid regurgitation is 41 mmHg.       A/P: 89 y.o. male with  type 2 diabetes mellitus, hypertension, hyperlipidemia, GERD, paroxysmal A-fib, CAD status post CABG x 4 on aspirin 81 mg and beta-blocker, who presents with acute cholecystitis with acute renal insufficiency.     AVSS. Abdominal pain and exam improved since yesterday. Patient breathing less labored as well.  Renal function stable.  Plan for laparoscopic possible open cholecystectomy with IOC today pending Cardiology repeat eval.  Continue NPO, IV abx.    Addendum: Discussed with Cardiology. Patient acceptable risk from a cardiac standpoint to proceed with surgery today.    Inocencia Vieira MD  General, Robotic and Endoscopic Surgery  [facility]    [address], [address]  [place], [place], [address]  P: [phone]  F: [phone]

## 2024-08-18 NOTE — OP NOTE
Operative Note :  Inocencia Vieira MD    Patient Name and :  Shaheen Saini  1935    Procedure Date:   24    Pre-op Diagnosis:  Acute cholecystitis [K81.0]    Post-Operative Diagnosis:  Acute gangrenous cholecystitis     Procedure:   Laparoscopic cholecystectomy with intraoperative cholangiogram  Right upper quadrant 19 Icelandic round Richar drain placement in the gallbladder fossa    Surgeon:   Inocencia Vieira MD    Assistant:   Assistant: Genesis Franklin CSA was responsible for performing the following activities: Retraction, Suturing, Closing, Placing Dressing, and Held/Positioned Camera and their skilled assistance was necessary for the success of this case.    Anesthesia:    General (general endotracheal tube)    Estimated Blood Loss:   150cc    Specimens:   Gallbladder    Complications:   None     Findings:   Severe inflammation in the right upper quadrant with peritonitis, thick omental adhesions to the fundus of the gallbladder, gangrenous gallbladder with perforation with pus contained with abscess cavity  Critical view obtained with single cystic duct and single cystic artery entering the gallbladder with the bottom third of the cystic plate cleared prior to cholangiogram  Intraoperative cholangiogram demonstrating adequate flow of contrast through the cystic duct, into the common duct, bilateral hepatic ducts, and duodenum without filling defect or delay; a small stone was removed from the cystic duct in order tissue the cholangiogram  No evidence of bleeding or leak from cystic artery stump or cystic duct stump following division of these structures  2 pieces of Surgicel and Shailesh placed within the cystic plate following removal of the gallbladder with adequate hemostasis noted prior to abdominal closure  Right upper quadrant 19 Icelandic round Richar drain placement in the gallbladder fossa    Description of procedure:  After informed consent was obtained, the patient was brought to the  operating room and placed supine on the operating table.  The abdomen was prepped and draped in usual sterile fashion.  A timeout was performed.      The abdomen was entered using a direct optical view technique with a 5 mm trocar in the right upper quadrant.  Local anesthetic was injected and the 11 blade was used to make a 5 mm incision through which the trocar was advanced.  All layers of the abdominal wall were visualized as the trocar was advanced into the peritoneal cavity.  The abdomen was insufflated and inspected and there was no evidence of injury from entry.  Subsequently a 12 mm subxiphoid port and two 5 mm right subcostal ports were placed under direct visualization following local anesthetic injection.    There was noted to be severe inflammation in the right upper quadrant with peritonitis of the abdominal wall and diaphragm, there were thick omental adhesions to the fundus of the gallbladder which were peeled off carefully to reveal a gangrenous gallbladder with a perforation of the fundus with pus contained within an abscess cavity.  The assistant carefully retracted the omentum down throughout the case to allow infundibular dissection.    The gallbladder was grasped with an atraumatic grasper and lifted over the dome of the liver.  The infudibular dissection was performed using a combination of bovie electrocautery and suction  gentle blunt dissection to reveal a critical view including a single cystic duct and single cystic artery, with the bottom 1/3 of the cystic plate cleared prior to intraoperative cholangiogram.      The infundibulum was clipped with a large clip and the scissors used to partially transect the cystic duct.  A small black stone was noted to be impacting the cystic duct.  It was removed..  The cholangiocatheter was advanced through the abdominal wall and into the cystic duct and clipped in place with a second large clip.    An intraoperative cholangiogram was performed  and demonstrated adequate filling of the bilateral hepatic ducts, common bile duct, and duodenum without filling defects.        Following cholangiogram, the cystic duct and cystic artery were clipped and divided with scissors.  Then the gallbladder was removed from the cystic plate using Bovie electrocautery.  The tissues were extremely friable and despite very gentle retraction the gallbladder essentially disintegrated off the cystic plate revealing an oozing liver bed.  The gallbladder was placed within an Endo Catch bag and removed from the abdomen via the subxiphoid incision and sent to pathology as specimen.  The liver bed was inspected and suctioned and 2 pieces of Surgicel and pressure were used to control bleeding along with Bovie electrocautery.  Then 3 g of Shailesh powder was applied..  Suction  was used to suction bile and blood from the field.  A 19 Kenyan round Richar drain was placed through the right lateral trocar site and directed within the gallbladder fossa.  The clips were inspected and appeared in good position with no bleeding or leak from the cystic artery stump or cystic duct stump.  The ports were removed under direct visualization.  The subxiphoid port site was closed with 0 Vicryl using M close device and then all skin incisions were closed using 4-0 Monocryl.  The operative field was cleaned and dried and exofin dressing placed.  All counts were correct at the end of the case.  The patient was awoken and transferred to PACU in stable condition.    Inocencia Vieira MD  General Surgery  [facility]    [address], [address]  [place] [address]  P: [phone]  F: [phone]

## 2024-08-18 NOTE — ANESTHESIA POSTPROCEDURE EVALUATION
Patient: Shaheen Saini    Procedure Summary       Date: 08/18/24 Room / Location: [facility] OR [address] [place] [address] MAIN OR    Anesthesia Start: 1020 Anesthesia Stop: 1229    Procedure: CHOLECYSTECTOMY LAPAROSCOPIC INTRAOPERATIVE CHOLANGIOGRAM (Abdomen) Diagnosis:       Acute cholecystitis      (Acute cholecystitis [K81.0])    Surgeons: Inocencia Vieira MD Provider: Sima Kline MD    Anesthesia Type: general ASA Status: 3            Anesthesia Type: general    Vitals  Vitals Value Taken Time   /57 08/18/24 1315   Temp 36.7 °C (98 °F) 08/18/24 1226   Pulse 68 08/18/24 1325   Resp 15 08/18/24 1315   SpO2 95 % 08/18/24 1325   Vitals shown include unfiled device data.        Post Anesthesia Care and Evaluation    Patient location during evaluation: PACU  Patient participation: complete - patient participated  Level of consciousness: awake  Pain management: adequate    Airway patency: patent  Anesthetic complications: No anesthetic complications  PONV Status: none  Cardiovascular status: stable  Respiratory status: acceptable  Hydration status: acceptable

## 2024-08-18 NOTE — ADDENDUM NOTE
Addendum  created 08/18/24 1348 by Sima Kline MD    Attestation recorded in Intraprocedure, Intraprocedure Attestations filed       No

## 2024-08-18 NOTE — PLAN OF CARE
Goal Outcome Evaluation:           Progress: improving  Outcome Evaluation: pt alert and oriented. went down for lap dinah today, 3 lap sites c/d/i. Aroldo drain intact with 50ml dark bloody output this shift. No c/o pain or discomfort. abx cont. Call light in reach.

## 2024-08-18 NOTE — PROGRESS NOTES
Name: Shaheen Saini ADMIT: 2024   : 1935  PCP: Pal Lawrence MD    MRN: [id number] LOS: 1 days   AGE/SEX: 89 y.o. male  ROOM: [facility]     Subjective   Subjective   Sitting up in bed this morning.  He feels his breathing is much improved.  Abdominal pain is also better.  Multiple family members at bedside.    Objective   Objective   Vital Signs  Temp:  [97.5 °F (36.4 °C)-99.3 °F (37.4 °C)] 97.5 °F (36.4 °C)  Heart Rate:  [69-80] 76  Resp:  [15-20] 16  BP: (124-140)/(54-68) 134/61  SpO2:  [95 %-100 %] 97 %  on  Flow (L/min):  [2] 2;   Device (Oxygen Therapy): room air  Body mass index is 29.58 kg/m².  Physical Exam  Constitutional:       Appearance: He is ill-appearing.      Comments: Hard of hearing   Pulmonary:      Effort: Pulmonary effort is normal. No respiratory distress.      Breath sounds: No stridor.   Abdominal:      General: There is no distension.      Tenderness: There is no abdominal tenderness.   Skin:     Coloration: Skin is not pale.   Neurological:      General: No focal deficit present.      Mental Status: He is alert.         Results Review     I reviewed the patient's new clinical results.  Results from last 7 days   Lab Units 24  0255 24  0821   WBC 10*3/mm3 14.20* 16.65*   HEMOGLOBIN g/dL 14.9 14.1   PLATELETS 10*3/mm3 165 161     Results from last 7 days   Lab Units 24  0255 24  1717 24  0821   SODIUM mmol/L 129* 130* 127*   POTASSIUM mmol/L 4.0 4.3 4.7   CHLORIDE mmol/L 97* 96* 94*   CO2 mmol/L 18.0* 22.0 20.0*   BUN mg/dL 32* 33* 34*   CREATININE mg/dL 1.26 1.51* 1.60*   GLUCOSE mg/dL 75 88 137*   EGFR mL/min/1.73 54.9* 44.1* 41.2*     Results from last 7 days   Lab Units 24  0255 24  0821   ALBUMIN g/dL 3.1* 3.6   BILIRUBIN mg/dL 0.9 0.9   ALK PHOS U/L 92 80   AST (SGOT) U/L 73* 64*   ALT (SGPT) U/L 47* 47*     Results from last 7 days   Lab Units 24  0255 24  1717 24  0821   CALCIUM mg/dL 8.8 9.1 8.9    ALBUMIN g/dL 3.1*  --  3.6   MAGNESIUM mg/dL 2.5*  --   --    PHOSPHORUS mg/dL 2.8  --   --      Results from last 7 days   Lab Units 08/17/24  0952   LACTATE mmol/L 1.9     Hemoglobin A1C   Date/Time Value Ref Range Status   08/18/2024 0656 6.70 (H) 4.80 - 5.60 % Final     Glucose   Date/Time Value Ref Range Status   08/18/2024 1231 118 70 - 130 mg/dL Final   08/18/2024 0739 103 70 - 130 mg/dL Final   08/17/2024 2020 85 70 - 130 mg/dL Final   08/17/2024 1603 93 70 - 130 mg/dL Final   08/17/2024 1226 112 70 - 130 mg/dL Final       No radiology results for the last day  Scheduled Medications  vitamin C, 500 mg, Oral, Daily  [Held by provider] aspirin, 81 mg, Oral, Daily  atenolol, 12.5 mg, Oral, Daily  insulin lispro, 2-9 Units, Subcutaneous, 4x Daily AC & at Bedtime  [Held by provider] lisinopril, 5 mg, Oral, Daily  multivitamin with minerals, 1 tablet, Oral, Daily  oxazepam, 15 mg, Oral, Daily  piperacillin-tazobactam, 3.375 g, Intravenous, Q8H  ranolazine, 500 mg, Oral, BID    Infusions   Diet  Diet: Liquid; Full Liquid; Fluid Consistency: Thin (IDDSI 0)       Assessment/Plan     Active Hospital Problems    Diagnosis  POA    **Gangrenous cholecystitis [K81.0]  Yes    ARF (acute renal failure) [N17.9]  Yes    Hyponatremia [E87.1]  Yes    Leukocytosis [D72.829]  Yes    Type 2 diabetes mellitus, without long-term current use of insulin [E11.9]  Yes    PAF (paroxysmal atrial fibrillation) [I48.0]  Yes    History of coronary artery bypass surgery [Z95.1]  Not Applicable    Hyperlipidemia [E78.5]  Yes    Hypertension [I10]  Yes    CAD (coronary artery disease) [I25.10]  Yes      Resolved Hospital Problems   No resolved problems to display.       89 y.o. male admitted with Gangrenous cholecystitis.      08/18/24  Laparoscopic cholecystectomy planned for today.    Acute cholecystitis  Leukocytosis  -CTA without PE; multiple gallstones with inflammatory change suspicious for acute cholecystitis  -General Surgery  following- lap dinah planned 8/18  -Empiric Zosyn     TOM, improved  -Crt 1.6 OA (b/l ~1) > 1.26  -likely prerenal   -monitor     DM2 w/ hyperglycemia  -hold Jardiance  -SSI, monitor BG     Hyponatremia  -Na 127 > 130   -improved with IVF in ED  -pt also on Paxil- risk of SIADH  -monitor     CAD s/p CABG  HTN  Dyspnea  Elevated troponin  -trop 29 > 37  -EKG w/out acute ischemia  -TTE normal EF, RVSP 41 mmHg  -Cardiology evaluated-intermediate risk for surgery, no preop testing recommended     R knee 'giving out'  -X-ray shows possible pseudogout, he does not any tenderness and denies pain  -Reassess postop with PT    Flow (L/min):  [2] 2    DVT prophylaxis: SCDs  Discussed with patient and family.  Anticipated discharge Pending PT and/or OT eval.,  postop            Ryan Moreland MD  [facility]  08/18/24  14:23 EDT

## 2024-08-18 NOTE — ANESTHESIA PREPROCEDURE EVALUATION
Anesthesia Evaluation     Patient summary reviewed and Nursing notes reviewed   no history of anesthetic complications:   NPO Solid Status: > 8 hours  NPO Liquid Status: > 2 hours           Airway   Mallampati: I  TM distance: <3 FB  Neck ROM: full  Dental      Comment: Dental repair, crowns      Pulmonary    (-) COPD, asthma  Cardiovascular     ECG reviewed  Patient on routine beta blocker  Rhythm: regular    (+) hypertension, CAD, CABG, dysrhythmias Paroxysmal Atrial Fib, hyperlipidemia  (-) past MI, angina, cardiac stents    ROS comment: Echo: ·  Left ventricular systolic function is normal. Left ventricular ejection fraction appears to be 61 - 65%.  ·  Left ventricular diastolic function was normal.  ·  Mild aortic valve stenosis is present.  Mild aortic regurgitation.  ·  Peak velocity of the flow distal to the aortic valve is 241 cm/s. Aortic valve maximum pressure gradient is 23 mmHg. Aortic valve mean pressure gradient is 13 mmHg. Aortic valve dimensionless index is 0.5 .  ·  Estimated right ventricular systolic pressure from tricuspid regurgitation is mildly elevated (35-45 mmHg). Calculated right ventricular systolic pressure from tricuspid regurgitation is 41 mmHg.         Neuro/Psych  (-) seizures, CVA  GI/Hepatic/Renal/Endo    (+) GERD well controlled, renal disease- ARF, diabetes mellitus type 2  (-) liver disease    Musculoskeletal     Abdominal    Substance History - negative use     OB/GYN          Other - negative ROS                         Anesthesia Plan    ASA 3     general     (I have reviewed the patient's history and chart with the patient, including all pertinent laboratory results and imaging. I have explained the risks of anesthesia including but not limited to dental or airway injury, nausea, cardio-pulmonary complications, such as aspiration, MI, stroke, or death.     VITALS:  /61 (BP Location: Right arm, Patient Position: Lying)   Pulse 73   Temp 37 °C (98.6 °F) (Oral)   Resp  "18   Ht 177 cm (69.69\")   Wt 92.7 kg (204 lb 4.8 oz)   SpO2 97%   BMI 29.58 kg/m² )  intravenous induction     Anesthetic plan, risks, benefits, and alternatives have been provided, discussed and informed consent has been obtained with: patient.  Pre-procedure education provided      CODE STATUS:    Level Of Support Discussed With: Patient  Code Status (Patient has no pulse and is not breathing): CPR (Attempt to Resuscitate)  Medical Interventions (Patient has pulse or is breathing): Full      "

## 2024-08-18 NOTE — PROGRESS NOTES
"[facility]    Patient Name: Shaheen Saini  :1935  89 y.o.  LOS: 1  Encounter Provider: Dada Rojo Jr, MD      Patient Care Team:  aPl Lawrence MD as PCP - General (Family Medicine)  Tommy Ellis MD as Consulting Physician (Cardiology)    Chief Complaint: Follow-up abdominal pain, acute cholecystitis, dyspnea, CAD with history of surgical revascularization    Interval History: Normal right and left ventricular size and function on echocardiogram.  Dyspnea is resolved after treatment for abdominal pain and initiation of antibiotics.  Case details discussed with Dr. Vieira.       Objective   Vital Signs  Temp:  [97.9 °F (36.6 °C)-99.3 °F (37.4 °C)] 98.6 °F (37 °C)  Heart Rate:  [73-80] 73  Resp:  [18-20] 18  BP: (120-138)/(44-68) 138/61    Intake/Output Summary (Last 24 hours) at 2024 1033  Last data filed at 2024 0315  Gross per 24 hour   Intake 240 ml   Output 1150 ml   Net -910 ml     Flowsheet Rows      Flowsheet Row First Filed Value   Admission Height 177.8 cm (70\") Documented at 2024 0822   Admission Weight 92.1 kg (203 lb) Documented at 2024 0822              Physical Exam  Vitals reviewed.   Constitutional:       Appearance: Healthy appearance. Not in distress.   Neck:      Vascular: JVD normal.   Pulmonary:      Effort: Pulmonary effort is normal.      Breath sounds: Normal breath sounds. No wheezing. No rhonchi. No rales.   Chest:      Chest wall: Not tender to palpatation.   Cardiovascular:      PMI at left midclavicular line. Normal rate. Regular rhythm. Normal S1. Normal S2.       Murmurs: There is no murmur.      No gallop.  No click. No rub.   Pulses:     Intact distal pulses.   Edema:     Peripheral edema absent.   Abdominal:      General: There is distension.      Tenderness: There is abdominal tenderness.   Musculoskeletal: Normal range of motion.         General: No tenderness. Skin:     General: Skin is warm and dry. " "  Neurological:      General: No focal deficit present.      Mental Status: Alert and oriented to person, place and time.    Physical exam was reviewed, updated and amended when necessary.    Pertinent Test Results:  Results from last 7 days   Lab Units 08/18/24  0255 08/17/24  1717 08/17/24  0821   SODIUM mmol/L 129* 130* 127*   POTASSIUM mmol/L 4.0 4.3 4.7   CHLORIDE mmol/L 97* 96* 94*   CO2 mmol/L 18.0* 22.0 20.0*   BUN mg/dL 32* 33* 34*   CREATININE mg/dL 1.26 1.51* 1.60*   GLUCOSE mg/dL 75 88 137*   CALCIUM mg/dL 8.8 9.1 8.9   AST (SGOT) U/L 73*  --  64*   ALT (SGPT) U/L 47*  --  47*     Results from last 7 days   Lab Units 08/17/24  1144 08/17/24  0821   HSTROP T ng/L 37* 29*     Results from last 7 days   Lab Units 08/18/24  0255 08/17/24  0821   WBC 10*3/mm3 14.20* 16.65*   HEMOGLOBIN g/dL 14.9 14.1   HEMATOCRIT % 43.8 41.1   PLATELETS 10*3/mm3 165 161         Results from last 7 days   Lab Units 08/18/24  0255   MAGNESIUM mg/dL 2.5*           Invalid input(s): \"LDLCALC\"  Results from last 7 days   Lab Units 08/17/24  0821   PROBNP pg/mL 2,851.0*               Medication Review:   [Transfer Hold] vitamin C, 500 mg, Oral, Daily  [Held by provider] aspirin, 81 mg, Oral, Daily  atenolol, 12.5 mg, Oral, Daily  [Transfer Hold] insulin lispro, 2-9 Units, Subcutaneous, 4x Daily AC & at Bedtime  [Held by provider] lisinopril, 5 mg, Oral, Daily  [Transfer Hold] multivitamin with minerals, 1 tablet, Oral, Daily  [Transfer Hold] oxazepam, 15 mg, Oral, Daily  piperacillin-tazobactam, 3.375 g, Intravenous, Q8H  [Transfer Hold] ranolazine, 500 mg, Oral, BID  sodium chloride, 3 mL, Intravenous, Q12H         lactated ringers, 9 mL/hr, Last Rate: 9 mL/hr (08/18/24 1020)        Assessment & Plan     Active Hospital Problems    Diagnosis  POA    **Acute cholecystitis [K81.0]  Yes    ARF (acute renal failure) [N17.9]  Yes    Hyponatremia [E87.1]  Yes    Leukocytosis [D72.829]  Yes    Type 2 diabetes mellitus, without long-term " current use of insulin [E11.9]  Yes    PAF (paroxysmal atrial fibrillation) [I48.0]  Yes    History of coronary artery bypass surgery [Z95.1]  Not Applicable    Hyperlipidemia [E78.5]  Yes    Hypertension [I10]  Yes    CAD (coronary artery disease) [I25.10]  Yes      Resolved Hospital Problems   No resolved problems to display.        Dyspnea -resolved after treatment for pain with acute cholecystitis and antibiotics overnight.  We held off on diuretics and he does not look volume overloaded.  Echocardiogram shows normal LV and RV size and function. Serum creatinine improved after he was given fluids back.  Hyponatremia is at baseline.  Clinical scenario not consistent with heart failure.  Patient has intermediate risk for perioperative MACE but no further cardiac testing is indicated and his risk is nonmodifiable.  Preoperative beta-blocker.  CAD -no angina.  Minimally elevated troponin is noted.  No acute ischemic changes on EKG.  No indication for ischemic workup.    Hypertension -mildly elevated but is adequate.  PAF -sinus rhythm currently.  It does not appear that he is on anticoagulation as outpatient.  Recommend continuation of preoperative beta-blocker.  TOM -he was given fluids in the emergency room and creatinine is improved.  Hyponatremia -1 28-1 30 which appears close to his baseline.  We will monitor closely.  Leukocytosis -improved with antibiotics           Dada Rojo Jr, MD  [facility]   [facility]  [address] [place] [address] - [address]  [place] [address]  Office: [phone]    08/18/24  10:33 EDT

## 2024-08-18 NOTE — ANESTHESIA PROCEDURE NOTES
Airway  Urgency: elective    Date/Time: 8/18/2024 10:35 AM  Airway not difficult    General Information and Staff    Patient location during procedure: OR  CRNA/CAA: Travis Marion, CHANELL    Indications and Patient Condition  Indications for airway management: airway protection    Preoxygenated: yes  MILS maintained throughout  Mask difficulty assessment: 1 - vent by mask    Final Airway Details  Final airway type: endotracheal airway      Successful airway: ETT  Cuffed: yes   Successful intubation technique: direct laryngoscopy  Facilitating devices/methods: intubating stylet  Endotracheal tube insertion site: oral  Blade: Nancy  Blade size: 3  ETT size (mm): 7.5  Cormack-Lehane Classification: grade III - view of epiglottis only  Placement verified by: chest auscultation and capnometry   Measured from: gums  ETT/EBT to gums (cm): 24  Number of attempts at approach: 1  Assessment: lips, teeth, and gum same as pre-op and atraumatic intubation             Problem: Patient Care Overview  Goal: Plan of Care/Patient Progress Review  Outcome: Adequate for Discharge Date Met: 09/02/18  Discharge instruction given at 10:34 AM 09/02/18.  Patient verbalizes understanding of pain management and following up with MD in 4-6 weeks. No questions at this time.  at bed side.  Getting dressed up. Will escort to door #6 as she is ready.    Escorted to door #6 around 10.50 am.

## 2024-08-19 LAB
ALBUMIN SERPL-MCNC: 3.2 G/DL (ref 3.5–5.2)
ALBUMIN/GLOB SERPL: 0.9 G/DL
ALP SERPL-CCNC: 98 U/L (ref 39–117)
ALT SERPL W P-5'-P-CCNC: 62 U/L (ref 1–41)
ANION GAP SERPL CALCULATED.3IONS-SCNC: 14.6 MMOL/L (ref 5–15)
AST SERPL-CCNC: 83 U/L (ref 1–40)
BASOPHILS # BLD AUTO: 0.03 10*3/MM3 (ref 0–0.2)
BASOPHILS NFR BLD AUTO: 0.2 % (ref 0–1.5)
BILIRUB SERPL-MCNC: 0.9 MG/DL (ref 0–1.2)
BUN SERPL-MCNC: 43 MG/DL (ref 8–23)
BUN/CREAT SERPL: 33.1 (ref 7–25)
CALCIUM SPEC-SCNC: 9.3 MG/DL (ref 8.6–10.5)
CHLORIDE SERPL-SCNC: 96 MMOL/L (ref 98–107)
CO2 SERPL-SCNC: 17.4 MMOL/L (ref 22–29)
CREAT SERPL-MCNC: 1.3 MG/DL (ref 0.76–1.27)
DEPRECATED RDW RBC AUTO: 43.7 FL (ref 37–54)
EGFRCR SERPLBLD CKD-EPI 2021: 52.8 ML/MIN/1.73
EOSINOPHIL # BLD AUTO: 0 10*3/MM3 (ref 0–0.4)
EOSINOPHIL NFR BLD AUTO: 0 % (ref 0.3–6.2)
ERYTHROCYTE [DISTWIDTH] IN BLOOD BY AUTOMATED COUNT: 12.4 % (ref 12.3–15.4)
GLOBULIN UR ELPH-MCNC: 3.6 GM/DL
GLUCOSE BLDC GLUCOMTR-MCNC: 199 MG/DL (ref 70–130)
GLUCOSE BLDC GLUCOMTR-MCNC: 219 MG/DL (ref 70–130)
GLUCOSE BLDC GLUCOMTR-MCNC: 243 MG/DL (ref 70–130)
GLUCOSE BLDC GLUCOMTR-MCNC: 279 MG/DL (ref 70–130)
GLUCOSE SERPL-MCNC: 202 MG/DL (ref 65–99)
HCT VFR BLD AUTO: 40.2 % (ref 37.5–51)
HGB BLD-MCNC: 13.9 G/DL (ref 13–17.7)
IMM GRANULOCYTES # BLD AUTO: 0.1 10*3/MM3 (ref 0–0.05)
IMM GRANULOCYTES NFR BLD AUTO: 0.7 % (ref 0–0.5)
LYMPHOCYTES # BLD AUTO: 0.7 10*3/MM3 (ref 0.7–3.1)
LYMPHOCYTES NFR BLD AUTO: 4.9 % (ref 19.6–45.3)
MAGNESIUM SERPL-MCNC: 2.9 MG/DL (ref 1.6–2.4)
MCH RBC QN AUTO: 33 PG (ref 26.6–33)
MCHC RBC AUTO-ENTMCNC: 34.6 G/DL (ref 31.5–35.7)
MCV RBC AUTO: 95.5 FL (ref 79–97)
MONOCYTES # BLD AUTO: 0.83 10*3/MM3 (ref 0.1–0.9)
MONOCYTES NFR BLD AUTO: 5.8 % (ref 5–12)
NEUTROPHILS NFR BLD AUTO: 12.74 10*3/MM3 (ref 1.7–7)
NEUTROPHILS NFR BLD AUTO: 88.4 % (ref 42.7–76)
NRBC BLD AUTO-RTO: 0 /100 WBC (ref 0–0.2)
PHOSPHATE SERPL-MCNC: 3.6 MG/DL (ref 2.5–4.5)
PLATELET # BLD AUTO: 185 10*3/MM3 (ref 140–450)
PMV BLD AUTO: 10.2 FL (ref 6–12)
POTASSIUM SERPL-SCNC: 3.9 MMOL/L (ref 3.5–5.2)
PROT SERPL-MCNC: 6.8 G/DL (ref 6–8.5)
RBC # BLD AUTO: 4.21 10*6/MM3 (ref 4.14–5.8)
SODIUM SERPL-SCNC: 128 MMOL/L (ref 136–145)
WBC NRBC COR # BLD AUTO: 14.4 10*3/MM3 (ref 3.4–10.8)

## 2024-08-19 PROCEDURE — 25010000002 PIPERACILLIN SOD-TAZOBACTAM PER 1 G: Performed by: STUDENT IN AN ORGANIZED HEALTH CARE EDUCATION/TRAINING PROGRAM

## 2024-08-19 PROCEDURE — 85025 COMPLETE CBC W/AUTO DIFF WBC: CPT | Performed by: STUDENT IN AN ORGANIZED HEALTH CARE EDUCATION/TRAINING PROGRAM

## 2024-08-19 PROCEDURE — 97530 THERAPEUTIC ACTIVITIES: CPT

## 2024-08-19 PROCEDURE — 84100 ASSAY OF PHOSPHORUS: CPT | Performed by: STUDENT IN AN ORGANIZED HEALTH CARE EDUCATION/TRAINING PROGRAM

## 2024-08-19 PROCEDURE — 99024 POSTOP FOLLOW-UP VISIT: CPT | Performed by: STUDENT IN AN ORGANIZED HEALTH CARE EDUCATION/TRAINING PROGRAM

## 2024-08-19 PROCEDURE — 99232 SBSQ HOSP IP/OBS MODERATE 35: CPT | Performed by: INTERNAL MEDICINE

## 2024-08-19 PROCEDURE — 97162 PT EVAL MOD COMPLEX 30 MIN: CPT

## 2024-08-19 PROCEDURE — 63710000001 INSULIN LISPRO (HUMAN) PER 5 UNITS: Performed by: STUDENT IN AN ORGANIZED HEALTH CARE EDUCATION/TRAINING PROGRAM

## 2024-08-19 PROCEDURE — 82948 REAGENT STRIP/BLOOD GLUCOSE: CPT

## 2024-08-19 PROCEDURE — 83735 ASSAY OF MAGNESIUM: CPT | Performed by: STUDENT IN AN ORGANIZED HEALTH CARE EDUCATION/TRAINING PROGRAM

## 2024-08-19 PROCEDURE — 36415 COLL VENOUS BLD VENIPUNCTURE: CPT | Performed by: STUDENT IN AN ORGANIZED HEALTH CARE EDUCATION/TRAINING PROGRAM

## 2024-08-19 PROCEDURE — 80053 COMPREHEN METABOLIC PANEL: CPT | Performed by: STUDENT IN AN ORGANIZED HEALTH CARE EDUCATION/TRAINING PROGRAM

## 2024-08-19 RX ORDER — AMOXICILLIN 250 MG
1 CAPSULE ORAL 2 TIMES DAILY
Status: DISCONTINUED | OUTPATIENT
Start: 2024-08-19 | End: 2024-08-21 | Stop reason: HOSPADM

## 2024-08-19 RX ORDER — POLYETHYLENE GLYCOL 3350 17 G/17G
17 POWDER, FOR SOLUTION ORAL DAILY
Status: DISCONTINUED | OUTPATIENT
Start: 2024-08-19 | End: 2024-08-21 | Stop reason: HOSPADM

## 2024-08-19 RX ADMIN — PIPERACILLIN AND TAZOBACTAM 3.38 G: 3; .375 INJECTION, POWDER, FOR SOLUTION INTRAVENOUS at 17:18

## 2024-08-19 RX ADMIN — PIPERACILLIN AND TAZOBACTAM 3.38 G: 3; .375 INJECTION, POWDER, FOR SOLUTION INTRAVENOUS at 02:03

## 2024-08-19 RX ADMIN — OXYCODONE HYDROCHLORIDE AND ACETAMINOPHEN 500 MG: 500 TABLET ORAL at 08:31

## 2024-08-19 RX ADMIN — RANOLAZINE 500 MG: 500 TABLET, FILM COATED, EXTENDED RELEASE ORAL at 20:55

## 2024-08-19 RX ADMIN — INSULIN LISPRO 6 UNITS: 100 INJECTION, SOLUTION INTRAVENOUS; SUBCUTANEOUS at 17:18

## 2024-08-19 RX ADMIN — PIPERACILLIN AND TAZOBACTAM 3.38 G: 3; .375 INJECTION, POWDER, FOR SOLUTION INTRAVENOUS at 11:55

## 2024-08-19 RX ADMIN — RANOLAZINE 500 MG: 500 TABLET, FILM COATED, EXTENDED RELEASE ORAL at 08:32

## 2024-08-19 RX ADMIN — POLYETHYLENE GLYCOL 3350 17 G: 17 POWDER, FOR SOLUTION ORAL at 11:55

## 2024-08-19 RX ADMIN — INSULIN LISPRO 2 UNITS: 100 INJECTION, SOLUTION INTRAVENOUS; SUBCUTANEOUS at 08:32

## 2024-08-19 RX ADMIN — INSULIN LISPRO 4 UNITS: 100 INJECTION, SOLUTION INTRAVENOUS; SUBCUTANEOUS at 20:56

## 2024-08-19 RX ADMIN — ATENOLOL 12.5 MG: 25 TABLET ORAL at 08:31

## 2024-08-19 RX ADMIN — SENNOSIDES AND DOCUSATE SODIUM 1 TABLET: 50; 8.6 TABLET ORAL at 20:55

## 2024-08-19 RX ADMIN — SENNOSIDES AND DOCUSATE SODIUM 1 TABLET: 50; 8.6 TABLET ORAL at 11:55

## 2024-08-19 RX ADMIN — Medication 1 TABLET: at 08:31

## 2024-08-19 RX ADMIN — INSULIN LISPRO 4 UNITS: 100 INJECTION, SOLUTION INTRAVENOUS; SUBCUTANEOUS at 11:55

## 2024-08-19 NOTE — PLAN OF CARE
Goal Outcome Evaluation:              Outcome Evaluation: VSS. 30 CC FROM DREA DRAIN.  RESTED WELL IN BETWEEN CARE WITHOUT C/O'S.                                Post-Care Instructions: I reviewed with the patient in detail post-care instructions. Patient is to wear sunprotection, and avoid picking at any of the treated lesions. Pt may apply Vaseline to crusted or scabbing areas. Detail Level: Zone Anesthesia Volume In Cc: 0.5 Total Number Of Lesions Treated: 15 Consent: The patient's consent was obtained including but not limited to risks of crusting, scabbing, blistering, scarring, darker or lighter pigmentary change, recurrence, incomplete removal and infection.

## 2024-08-19 NOTE — PLAN OF CARE
Goal Outcome Evaluation:              Outcome Evaluation: Pt up in chair majority of shift, worked with PT, stand by assist w/ walker. Pt often ask about people and items in room that are not actually there. Pt is disoriented to place and situation. Pt needs frequent reorientating. Zosyn admin. Pt denies any pain. Pt expresses no other needs at this time. call light within reach.

## 2024-08-19 NOTE — PROGRESS NOTES
"General Surgery Progress Note    CC: gangrenous cholecystitis    S: Patient denies abdominal pain, nausea, or vomiting.  Tolerating full liquids.  Has been passing gas but not had a bowel movement.  Worked with PT earlier.  Per his daughter, he has had some hallucinations, regarding the television, as well as confusion about where he is and who his doctors are.  Daughter also states patient struggles with chronic constipation at home.    O:/85 (BP Location: Left arm, Patient Position: Lying)   Pulse 69   Temp 97.7 °F (36.5 °C) (Oral)   Resp 18   Ht 177 cm (69.69\")   Wt 94 kg (207 lb 3.7 oz)   SpO2 96%   BMI 30.00 kg/m²     Intake & Output (last day)         08/18 0701 08/19 0700 08/19 0701 08/20 0700    P.O. 210     I.V. (mL/kg) 650 (6.9)     Total Intake(mL/kg) 860 (9.1)     Urine (mL/kg/hr) 0 (0)     Drains 80     Total Output 80     Net +780           Urine Unmeasured Occurrence 3 x             GENERAL: awake, alert, interactive, cooperative, appears to remember me and details of her prior conversations today during exam  HEENT: EOMI, clear sclera, moist mucus membranes   CHEST: normal work of breathing  CARDIAC: well perfused  GI: Abd soft, mildly distended, appropriately tender around incisions which are clean and dry with skin glue in place, right upper quadrant drain with approximately 50 cc of dark serosanguinous output  EXTREMITIES: RIOS, no cyanosis    SKIN: Warm and dry, no rash    LABS  Results from last 7 days   Lab Units 08/19/24  0338 08/18/24  0255 08/17/24  0821   WBC 10*3/mm3 14.40* 14.20* 16.65*   HEMOGLOBIN g/dL 13.9 14.9 14.1   HEMATOCRIT % 40.2 43.8 41.1   PLATELETS 10*3/mm3 185 165 161     Results from last 7 days   Lab Units 08/19/24  0338 08/18/24  0255 08/17/24  1717 08/17/24  0821   SODIUM mmol/L 128* 129* 130* 127*   POTASSIUM mmol/L 3.9 4.0 4.3 4.7   CHLORIDE mmol/L 96* 97* 96* 94*   CO2 mmol/L 17.4* 18.0* 22.0 20.0*   BUN mg/dL 43* 32* 33* 34*   CREATININE mg/dL 1.30* 1.26 " 1.51* 1.60*   CALCIUM mg/dL 9.3 8.8 9.1 8.9   BILIRUBIN mg/dL 0.9 0.9  --  0.9   ALK PHOS U/L 98 92  --  80   ALT (SGPT) U/L 62* 47*  --  47*   AST (SGOT) U/L 83* 73*  --  64*   GLUCOSE mg/dL 202* 75 88 137*           IMAGING:  IOC report reviewed along with images, there is a small filling defect within the distal common bile duct throughout the exam suspicious for possible choledocholithiasis, further evaluation with MRI and MRCP of the abdomen was recommended    A/P: 89 y.o. male with cholelithiasis with acute gangrenous cholecystitis and possible choledocholithiasis.    Postop day 1 from laparoscopic cholecystectomy with intraoperative cholangiogram and right upper quadrant drain placement  AVSS.  Abdominal exam appropriate postop.  Tolerating advancement of diet.  Okay for regular diet today.  Transaminases mildly elevated with no T. bili elevation.  Expected postop given the amount of inflammation he had at his liver bed.  IOC results reviewed with patient and family, at this point I do not have suspicion for an obstructing distal CBD stone and will hold off further imaging and trend his LFTs.  If they increase, would recommend consulting GI for possible ERCP.  Schedule miralax and docusate senna.  Continue RUQ drain. Monitor strict I/O.  Encourage IS, out of bed with assistance, PT.  Delirium precautions.  OK for DVT ppx.      Inocencia Vieira MD  General, Robotic and Endoscopic Surgery  [facility]    [address], [address]  [place], [place], [address]  P: [phone]  F: [phone]

## 2024-08-19 NOTE — PROGRESS NOTES
Hospital Follow Up    LOS: 2  Patient Name: Shaheen Saini  Age/Sex: 89 y.o. male  : 1935  MRN: [id number]    Day of Service: 24   Length of Stay: 2  Encounter Provider: Georgi Pro MD  Place of Service: [facility] CARDIOLOGY  Patient Care Team:  Pal Lawrence MD as PCP - General (Family Medicine)  Tommy Ellis MD as Consulting Physician (Cardiology)    Subjective:     Chief Complaint: Coronary artery disease/perioperative management    Interval History: Patient was confused today.  He had no complaints.    Objective:     Objective:  Temp:  [97.5 °F (36.4 °C)-97.9 °F (36.6 °C)] 97.7 °F (36.5 °C)  Heart Rate:  [68-76] 69  Resp:  [16-18] 18  BP: (112-160)/(54-85) 160/85     Intake/Output Summary (Last 24 hours) at 2024 1315  Last data filed at 2024 0910  Gross per 24 hour   Intake 450 ml   Output 80 ml   Net 370 ml     Body mass index is 30 kg/m².      24  1651 24  0515 24  0539   Weight: 93 kg (205 lb 0.4 oz) 92.7 kg (204 lb 4.8 oz) 94 kg (207 lb 3.7 oz)     Weight change: 1.92 kg (4 lb 3.7 oz)      Physical Exam:   General :  Alert, cooperative, in no acute distress.  Neuro:   Alert,cooperative and oriented.  Lungs:  CTAB. Normal respiratory effort and rate.  CV:  Regular rate and rhythm, normal S1 and S2, no murmurs, gallops or rubs.   ABD:  Soft, nontender, nondistended. Positive bowel sounds.  Extr:  No edema or cyanosis, moves all extremities.    Lab Review:   Results from last 7 days   Lab Units 24  0338 24  0255   SODIUM mmol/L 128* 129*   POTASSIUM mmol/L 3.9 4.0   CHLORIDE mmol/L 96* 97*   CO2 mmol/L 17.4* 18.0*   BUN mg/dL 43* 32*   CREATININE mg/dL 1.30* 1.26   GLUCOSE mg/dL 202* 75   CALCIUM mg/dL 9.3 8.8   AST (SGOT) U/L 83* 73*   ALT (SGPT) U/L 62* 47*     Results from last 7 days   Lab Units 24  1144 24  0821   HSTROP T ng/L 37* 29*     Results from last 7 days   Lab Units  "08/19/24  0338 08/18/24  0255   WBC 10*3/mm3 14.40* 14.20*   HEMOGLOBIN g/dL 13.9 14.9   HEMATOCRIT % 40.2 43.8   PLATELETS 10*3/mm3 185 165         Results from last 7 days   Lab Units 08/19/24  0338 08/18/24  0255   MAGNESIUM mg/dL 2.9* 2.5*           Invalid input(s): \"LDLCALC\"  Results from last 7 days   Lab Units 08/17/24  0821   PROBNP pg/mL 2,851.0*           Current Medications:   Scheduled Meds:vitamin C, 500 mg, Oral, Daily  [Held by provider] aspirin, 81 mg, Oral, Daily  atenolol, 12.5 mg, Oral, Daily  insulin lispro, 2-9 Units, Subcutaneous, 4x Daily AC & at Bedtime  [Held by provider] lisinopril, 5 mg, Oral, Daily  multivitamin with minerals, 1 tablet, Oral, Daily  oxazepam, 15 mg, Oral, Daily  piperacillin-tazobactam, 3.375 g, Intravenous, Q8H  polyethylene glycol, 17 g, Oral, Daily  ranolazine, 500 mg, Oral, BID  senna-docusate sodium, 1 tablet, Oral, BID      Continuous Infusions:     Allergies:  No Known Allergies    Assessment:       Gangrenous cholecystitis    Hyperlipidemia    Hypertension    CAD (coronary artery disease)    History of coronary artery bypass surgery    Type 2 diabetes mellitus, without long-term current use of insulin    PAF (paroxysmal atrial fibrillation)    ARF (acute renal failure)    Hyponatremia    Leukocytosis        Plan:     1.  Dyspnea patient said he is breathing fine.  2.  Coronary artery disease denied chest discomforts.  3.  Status post cholecystectomy  4.  History of paroxysmal atrial fibrillation.  5.  Confusion patient was alert but clearly was confused.  6.  Hyponatremia  7.  At this point continue supportive care will follow rhythm from a cardiovascular standpoint reassess tomorrow.  Georgi Pro MD  08/19/24  13:15 EDT      "

## 2024-08-19 NOTE — THERAPY EVALUATION
Patient Name: Shaheen Saini  : 1935    MRN: [id number]                              Today's Date: 2024       Admit Date: 2024    Visit Dx:     ICD-10-CM ICD-9-CM   1. Acute cholecystitis  K81.0 575.0   2. Hyponatremia  E87.1 276.1   3. TOM (acute kidney injury)  N17.9 584.9     Patient Active Problem List   Diagnosis    Diabetes mellitus    Hyperlipidemia    Hypertension    GERD (gastroesophageal reflux disease)    CAD (coronary artery disease)    History of echocardiogram    History of cardiac cath    History of coronary artery bypass surgery    Mild aortic insufficiency    Cellulitis of right lower extremity    Type 2 diabetes mellitus, without long-term current use of insulin    PAF (paroxysmal atrial fibrillation)    Gangrenous cholecystitis    ARF (acute renal failure)    Hyponatremia    Leukocytosis     Past Medical History:   Diagnosis Date    CAD (coronary artery disease)     Diabetes mellitus     GERD (gastroesophageal reflux disease)     History of cardiac cath     12 - LVEF 65%. 1+ MV insufficiency. Three vessel CAD. Three of four bypass grafts remain patent. The vein graft to the third marginal branch is a previously documented occlusion. The distal circumflex is unchanged from angiogram 09. 09 - LVEF 65%.Three vessel CAD s/p CABG. One fraft to marginal branch is occluded, the rest of the grafts are patent. The marginal branch is diffusely dis    History of echocardiogram     18 - LA mildly dilated. Normal LV function. Mild MR. Mild-moderate TR. Mild AR. 2012 - EF 60-65%. Moderate MI, TI, and PI. Mild AI.    Hyperlipidemia     Hypertension     Paroxysmal atrial fibrillation      Past Surgical History:   Procedure Laterality Date    CORONARY ARTERY BYPASS GRAFT  2005    CABG x4. Internal mammary artery to LAD, vein graft to diagonal artery, vein graft to OM, vein graft to posterior descending artery.      General Information       Row Name 24 8801           Physical Therapy Time and Intention    Document Type evaluation  Pt. is s/p Cholecystectomy  -MS     Mode of Treatment physical therapy;individual therapy  -MS       Row Name 08/19/24 1108          General Information    Patient Profile Reviewed yes  -MS     Prior Level of Function independent:  Use of a cane for ambulation  -MS     Existing Precautions/Restrictions fall  Exit alarm;  J.P. Drain x 1  -MS     Barriers to Rehab none identified  -MS       Row Name 08/19/24 1108          Cognition    Orientation Status (Cognition) oriented to;place;person  -MS       Row Name 08/19/24 1108          Safety Issues, Functional Mobility    Comment, Safety Issues/Impairments (Mobility) Gait belt used for safety.  -MS               User Key  (r) = Recorded By, (t) = Taken By, (c) = Cosigned By      Initials Name Provider Type    Bill Tamayo, PT Physical Therapist                   Mobility       Row Name 08/19/24 1109          Bed Mobility    Bed Mobility supine-sit;sit-supine  -MS     Supine-Sit [place] (Bed Mobility) standby assist  -MS     Sit-Supine [place] (Bed Mobility) standby assist  -MS       Row Name 08/19/24 1109          Sit-Stand Transfer    Sit-Stand [place] (Transfers) contact guard  -MS     Assistive Device (Sit-Stand Transfers) walker, front-wheeled  -MS       Row Name 08/19/24 1109          Gait/Stairs (Locomotion)    [place] Level (Gait) contact guard  -MS     Assistive Device (Gait) walker, front-wheeled  -MS     Distance in Feet (Gait) 40  -MS     Deviations/Abnormal Patterns (Gait) santhosh decreased  -MS     Bilateral Gait Deviations forward flexed posture  -MS     Comment, (Gait/Stairs) Verbal/tactile cues given for posture correction.  -MS               User Key  (r) = Recorded By, (t) = Taken By, (c) = Cosigned By      Initials Name Provider Type    Bill Tamayo, PT Physical Therapist                   Obj/Interventions       Row Name 08/19/24 1109          Range  of Motion Comprehensive    Comment, General Range of Motion BUE/LE (WFL's)  -MS       Row Name 08/19/24 1109          Strength Comprehensive (MMT)    Comment, General Manual Muscle Testing (MMT) Assessment BUE/LE (4-/5)  -MS               User Key  (r) = Recorded By, (t) = Taken By, (c) = Cosigned By      Initials Name Provider Type    MS Ye Bill SAUNDERS, PT Physical Therapist                   Goals/Plan       Row Name 08/19/24 1110          Bed Mobility Goal 1 (PT)    Activity/Assistive Device (Bed Mobility Goal 1, PT) bed mobility activities, all  -MS     [place] Level/Cues Needed (Bed Mobility Goal 1, PT) independent  -MS     Time Frame (Bed Mobility Goal 1, PT) long term goal (LTG);1 week  -MS       Row Name 08/19/24 1110          Transfer Goal 1 (PT)    Activity/Assistive Device (Transfer Goal 1, PT) transfers, all;walker, rolling  -MS     [place] Level/Cues Needed (Transfer Goal 1, PT) standby assist  -MS     Time Frame (Transfer Goal 1, PT) long term goal (LTG);1 week  -MS       Row Name 08/19/24 1110          Gait Training Goal 1 (PT)    Activity/Assistive Device (Gait Training Goal 1, PT) gait (walking locomotion);walker, rolling  -MS     [place] Level (Gait Training Goal 1, PT) standby assist  -MS     Distance (Gait Training Goal 1, PT) 100 feet  -MS     Time Frame (Gait Training Goal 1, PT) long term goal (LTG);1 week  -MS       Row Name 08/19/24 1110          Therapy Assessment/Plan (PT)    Planned Therapy Interventions (PT) balance training;bed mobility training;gait training;home exercise program;patient/family education;postural re-education;transfer training;strengthening  -MS               User Key  (r) = Recorded By, (t) = Taken By, (c) = Cosigned By      Initials Name Provider Type    MS YeBill, PT Physical Therapist                   Clinical Impression       Row Name 08/19/24 1109          Pain    Pretreatment Pain Rating 1/10  -MS     Posttreatment Pain Rating 1/10   "-MS     Pain Location - abdomen  -MS     Pre/Posttreatment Pain Comment Pt. reports his abdomen is \"sore\"  -MS       Row Name 08/19/24 1109          Plan of Care Review    Plan of Care Reviewed With patient;family  -MS       Row Name 08/19/24 1109          Therapy Assessment/Plan (PT)    Rehab Potential (PT) good, to achieve stated therapy goals  -MS     Criteria for Skilled Interventions Met (PT) meets criteria  -MS     Therapy Frequency (PT) 6 times/wk  -MS       Row Name 08/19/24 1109          Positioning and Restraints    Pre-Treatment Position in bed  -MS     Post Treatment Position bed  -MS     In Bed notified nsg;supine;call light within reach;encouraged to call for assist;exit alarm on;with family/caregiver  -MS               User Key  (r) = Recorded By, (t) = Taken By, (c) = Cosigned By      Initials Name Provider Type    Bill Tamayo, PT Physical Therapist                   Outcome Measures       Row Name 08/19/24 1111          How much help from another person do you currently need...    Turning from your back to your side while in flat bed without using bedrails? 3  -MS     Moving from lying on back to sitting on the side of a flat bed without bedrails? 3  -MS     Moving to and from a bed to a chair (including a wheelchair)? 3  -MS     Standing up from a chair using your arms (e.g., wheelchair, bedside chair)? 3  -MS     Climbing 3-5 steps with a railing? 3  -MS     To walk in hospital room? 3  -MS     AM-PAC 6 Clicks Score (PT) 18  -MS     Highest Level of Mobility Goal 6 --> Walk 10 steps or more  -MS       Row Name 08/19/24 1111          Functional Assessment    Outcome Measure Options AM-PAC 6 Clicks Basic Mobility (PT)  -MS               User Key  (r) = Recorded By, (t) = Taken By, (c) = Cosigned By      Initials Name Provider Type    Bill Tamayo, PT Physical Therapist                                 Physical Therapy Education       Title: PT OT SLP Therapies (In Progress)       " Topic: Physical Therapy (In Progress)       Point: Mobility training (Done)       Learning Progress Summary             Patient Acceptance, E,D, VU,NR by MS at 8/19/2024 1111                         Point: Home exercise program (Not Started)       Learner Progress:  Not documented in this visit.              Point: Body mechanics (Done)       Learning Progress Summary             Patient Acceptance, E,D, VU,NR by MS at 8/19/2024 1111                         Point: Precautions (Done)       Learning Progress Summary             Patient Acceptance, E,D, VU,NR by MS at 8/19/2024 1111                                         User Key       Initials Effective Dates Name Provider Type Discipline    MS 06/16/21 -  Bill Ye, PT Physical Therapist PT                  PT Recommendation and Plan  Planned Therapy Interventions (PT): balance training, bed mobility training, gait training, home exercise program, patient/family education, postural re-education, transfer training, strengthening  Plan of Care Reviewed With: patient  Outcome Evaluation: Pt. is an 89 year old Male who is s/p Cholecystectomy.  Pt. reports that prior to admission he was using a cane for ambulation.  Pt. currently presents with decreased strength, decreased balance, and decreased tolerance to functional activity.  This AM, pt. able to ambulate 40 feet, CGA x 1, with use of Rwx.  Pt. requires SBA x 1 for bed mobility and CGA x 1 for sit <-> stand transfers. Verbal/tactile cues given during ambulation for posture correction.  Pt. will benefit from skilled inpt. P.T. to address his functional deficits and to assist pt. in regaining his maximum level of independence with functional mobility.     Time Calculation:         PT Charges       Row Name 08/19/24 1115             Time Calculation    Start Time 0955  -MS      Stop Time 1010  -MS      Time Calculation (min) 15 min  -MS      PT Received On 08/19/24  -MS      PT - Next Appointment 08/20/24  -MS       PT Goal Re-Cert Due Date 08/26/24  -MS         Time Calculation- PT    Total Timed Code Minutes- PT 14 minute(s)  -MS                User Key  (r) = Recorded By, (t) = Taken By, (c) = Cosigned By      Initials Name Provider Type    Bill Tamayo, PT Physical Therapist                  Therapy Charges for Today       Code Description Service Date Service Provider Modifiers Qty    74601245301 HC PT EVAL MOD COMPLEXITY 2 8/19/2024 Bill Ye, PT GP 1    11942791517  PT THERAPEUTIC ACT EA 15 MIN 8/19/2024 Bill Ye, PT GP 1            PT G-Codes  Outcome Measure Options: AM-PAC 6 Clicks Basic Mobility (PT)  AM-PAC 6 Clicks Score (PT): 18  PT Discharge Summary  Anticipated Discharge Disposition (PT): home with assist, home with outpatient therapy services    Bill Ye, PT  8/19/2024

## 2024-08-19 NOTE — CASE MANAGEMENT/SOCIAL WORK
Discharge Planning Assessment  [facility]     Patient Name: Shaheen Saini  MRN: [id number]  Today's Date: 8/19/2024    Admit Date: 8/17/2024    Plan: Home with family support, family to transport   Discharge Needs Assessment       Row Name 08/19/24 1314       Living Environment    People in Home spouse;child(wei), adult    Name(s) of People in Home Flor Saini [phone], Tarah saini [phone]    Current Living Arrangements home    Potentially Unsafe Housing Conditions none    In the past 12 months has the electric, gas, oil, or water company threatened to shut off services in your home? No    Primary Care Provided by self    Provides Primary Care For no one    Family Caregiver if Needed child(wei), adult    Family Caregiver Names Traah Saini    Quality of Family Relationships helpful;involved;supportive    Able to Return to Prior Arrangements yes       Resource/Environmental Concerns    Resource/Environmental Concerns none    Transportation Concerns none       Transportation Needs    In the past 12 months, has lack of transportation kept you from medical appointments or from getting medications? no    In the past 12 months, has lack of transportation kept you from meetings, work, or from getting things needed for daily living? No       Food Insecurity    Within the past 12 months, you worried that your food would run out before you got the money to buy more. Never true    Within the past 12 months, the food you bought just didn't last and you didn't have money to get more. Never true       Transition Planning    Patient/Family Anticipates Transition to home;home with family    Patient/Family Anticipated Services at Transition none    Transportation Anticipated family or friend will provide       Discharge Needs Assessment    Readmission Within the Last 30 Days no previous admission in last 30 days    Equipment Currently Used at Home cane, straight;walker, rolling    Concerns to be Addressed no discharge  needs identified;denies needs/concerns at this time    Anticipated Changes Related to Illness none    Equipment Needed After Discharge none                   Discharge Plan       Row Name 08/19/24 6306       Plan    Plan Home with family support, family to transport    Plan Comments Met with pt at bedside. Permission obtained to speak to pt in front of family. Introduced self and explained role of . Face sheet verified, PCP is Pal Lawrence. Pt denies any difficulty paying for medications, and he obtains his medications from [facility]. Pt lives with his spouse, and his daughter, Nereida, who provides any care needs that may arise. Pt participates in his ADL's, but does require some assistance. Pt is mobile with a cane/walker. Pt has never had home health, and he has never been to SNF/Rehab, He has had outpt PT at [facility]. Pt/daughter does not anticipate requiring either service upon discharge. Explained that CCP would follow to assess for discharge needs.                  Continued Care and Services - Admitted Since 8/17/2024    No active coordination exists for this encounter.       Expected Discharge Date and Time       Expected Discharge Date Expected Discharge Time    Aug 21, 2024            Demographic Summary    No documentation.                  Functional Status    No documentation.                  Psychosocial    No documentation.                  Abuse/Neglect    No documentation.                  Legal    No documentation.                  Substance Abuse    No documentation.                  Patient Forms    No documentation.                     Kathy Saldaña RN

## 2024-08-19 NOTE — PROGRESS NOTES
Name: Shaheen Saini ADMIT: 2024   : 1935  PCP: Pal Lawrence MD    MRN: [id number] LOS: 2 days   AGE/SEX: 89 y.o. male  ROOM: [facility]     Subjective   Subjective   Patient is seen at bedside, no new complaints.       Objective   Objective   Vital Signs  Temp:  [97.7 °F (36.5 °C)-98.4 °F (36.9 °C)] 98.4 °F (36.9 °C)  Heart Rate:  [68-73] 69  Resp:  [16-18] 18  BP: (124-160)/(57-85) 145/62  SpO2:  [96 %-97 %] 97 %  on   ;   Device (Oxygen Therapy): room air  Body mass index is 30 kg/m².  Physical Exam  General, awake and alert.  Mildly confused  Head and ENT, normocephalic and atraumatic.  Lungs, symmetric expansion, equal air entry bilaterally.  Heart, regular rate and rhythm.  Abdomen, soft and nontender.  Extremities, no clubbing or cyanosis.  Neuro, no focal deficits.  Skin: Warm and no rash.  Psych, normal mood and affect.  Musculoskeletal, joint examination is grossly normal.    Copied text material from yesterday's note has been reviewed for appropriate changes and remains accurate as of 24.      Results Review     I reviewed the patient's new clinical results.  Results from last 7 days   Lab Units 24  0338 24  0255 24  0821   WBC 10*3/mm3 14.40* 14.20* 16.65*   HEMOGLOBIN g/dL 13.9 14.9 14.1   PLATELETS 10*3/mm3 185 165 161     Results from last 7 days   Lab Units 24  0338 24  0255 24  1717 24  0821   SODIUM mmol/L 128* 129* 130* 127*   POTASSIUM mmol/L 3.9 4.0 4.3 4.7   CHLORIDE mmol/L 96* 97* 96* 94*   CO2 mmol/L 17.4* 18.0* 22.0 20.0*   BUN mg/dL 43* 32* 33* 34*   CREATININE mg/dL 1.30* 1.26 1.51* 1.60*   GLUCOSE mg/dL 202* 75 88 137*   EGFR mL/min/1.73 52.8* 54.9* 44.1* 41.2*     Results from last 7 days   Lab Units 24  0338 24  0255 24  0821   ALBUMIN g/dL 3.2* 3.1* 3.6   BILIRUBIN mg/dL 0.9 0.9 0.9   ALK PHOS U/L 98 92 80   AST (SGOT) U/L 83* 73* 64*   ALT (SGPT) U/L 62* 47* 47*     Results from last 7 days   Lab  Units 08/19/24  0338 08/18/24  0255 08/17/24  1717 08/17/24  0821   CALCIUM mg/dL 9.3 8.8 9.1 8.9   ALBUMIN g/dL 3.2* 3.1*  --  3.6   MAGNESIUM mg/dL 2.9* 2.5*  --   --    PHOSPHORUS mg/dL 3.6 2.8  --   --      Results from last 7 days   Lab Units 08/17/24  0952   LACTATE mmol/L 1.9     Hemoglobin A1C   Date/Time Value Ref Range Status   08/18/2024 0656 6.70 (H) 4.80 - 5.60 % Final     Glucose   Date/Time Value Ref Range Status   08/19/2024 1613 279 (H) 70 - 130 mg/dL Final   08/19/2024 1142 219 (H) 70 - 130 mg/dL Final   08/19/2024 0754 199 (H) 70 - 130 mg/dL Final   08/18/2024 2120 227 (H) 70 - 130 mg/dL Final   08/18/2024 1615 164 (H) 70 - 130 mg/dL Final   08/18/2024 1231 118 70 - 130 mg/dL Final   08/18/2024 0739 103 70 - 130 mg/dL Final       No radiology results for the last day    I have personally reviewed all medications:  Scheduled Medications  vitamin C, 500 mg, Oral, Daily  [Held by provider] aspirin, 81 mg, Oral, Daily  atenolol, 12.5 mg, Oral, Daily  insulin lispro, 2-9 Units, Subcutaneous, 4x Daily AC & at Bedtime  [Held by provider] lisinopril, 5 mg, Oral, Daily  multivitamin with minerals, 1 tablet, Oral, Daily  oxazepam, 15 mg, Oral, Daily  piperacillin-tazobactam, 3.375 g, Intravenous, Q8H  polyethylene glycol, 17 g, Oral, Daily  ranolazine, 500 mg, Oral, BID  senna-docusate sodium, 1 tablet, Oral, BID    Infusions   Diet  Diet: Regular/House; Fluid Consistency: Thin (IDDSI 0)    I have personally reviewed:  [x]  Laboratory   [x]  Microbiology   [x]  Radiology   [x]  EKG/Telemetry  [x]  Cardiology/Vascular   []  Pathology    []  Records       Assessment/Plan     Active Hospital Problems    Diagnosis  POA    **Gangrenous cholecystitis [K81.0]  Yes    ARF (acute renal failure) [N17.9]  Yes    Hyponatremia [E87.1]  Yes    Leukocytosis [D72.829]  Yes    Type 2 diabetes mellitus, without long-term current use of insulin [E11.9]  Yes    PAF (paroxysmal atrial fibrillation) [I48.0]  Yes    History of  coronary artery bypass surgery [Z95.1]  Not Applicable    Hyperlipidemia [E78.5]  Yes    Hypertension [I10]  Yes    CAD (coronary artery disease) [I25.10]  Yes      Resolved Hospital Problems   No resolved problems to display.       89 y.o. male admitted with Gangrenous cholecystitis.    Acute cholecystitis  Leukocytosis  -CTA without PE; multiple gallstones with inflammatory change suspicious for acute cholecystitis  -General Surgery following- s/p lap dinah planned 8/18  -Empiric Zosyn     TOM, improved  -Crt 1.6 OA (b/l ~1) > 1.26  -likely prerenal   -monitor     DM2 w/ hyperglycemia  -hold Jardiance  -SSI, monitor BG     Hyponatremia  -Na 127 > 128  -improved with IVF in ED  -pt also on Paxil- risk of SIADH  -monitor     CAD s/p CABG  HTN  Dyspnea  Elevated troponin  -trop 29 > 37  -EKG w/out acute ischemia  -TTE normal EF, RVSP 41 mmHg  -Cardiology evaluated-intermediate risk for surgery, no preop testing recommended     R knee 'giving out'  -X-ray shows possible pseudogout, he does not any tenderness and denies pain  -Reassess postop with PT     Flow (L/min):  [2] 2     DVT prophylaxis: SCDs  Discussed with patient and family.  Anticipated discharge Pending PT and/or OT eval.,  postop      Chaparro Quiroz MD  [facility]  08/19/24  17:50 EDT

## 2024-08-19 NOTE — PLAN OF CARE
Goal Outcome Evaluation:  Plan of Care Reviewed With: patient           Outcome Evaluation: Pt. is an 89 year old Male who is s/p Cholecystectomy.  Pt. reports that prior to admission he was using a cane for ambulation.  Pt. currently presents with decreased strength, decreased balance, and decreased tolerance to functional activity.  This AM, pt. able to ambulate 40 feet, CGA x 1, with use of Rwx.  Pt. requires SBA x 1 for bed mobility and CGA x 1 for sit <-> stand transfers. Verbal/tactile cues given during ambulation for posture correction.  Pt. will benefit from skilled inpt. P.T. to address his functional deficits and to assist pt. in regaining his maximum level of independence with functional mobility.      Anticipated Discharge Disposition (PT): home with assist, home with outpatient therapy services

## 2024-08-20 LAB
ALBUMIN SERPL-MCNC: 3 G/DL (ref 3.5–5.2)
ALBUMIN/GLOB SERPL: 1.1 G/DL
ALP SERPL-CCNC: 106 U/L (ref 39–117)
ALT SERPL W P-5'-P-CCNC: 67 U/L (ref 1–41)
ANION GAP SERPL CALCULATED.3IONS-SCNC: 10 MMOL/L (ref 5–15)
AST SERPL-CCNC: 89 U/L (ref 1–40)
BASOPHILS # BLD AUTO: 0.03 10*3/MM3 (ref 0–0.2)
BASOPHILS NFR BLD AUTO: 0.2 % (ref 0–1.5)
BILIRUB SERPL-MCNC: 0.6 MG/DL (ref 0–1.2)
BUN SERPL-MCNC: 57 MG/DL (ref 8–23)
BUN/CREAT SERPL: 41.9 (ref 7–25)
CALCIUM SPEC-SCNC: 8.8 MG/DL (ref 8.6–10.5)
CHLORIDE SERPL-SCNC: 101 MMOL/L (ref 98–107)
CO2 SERPL-SCNC: 23 MMOL/L (ref 22–29)
CREAT SERPL-MCNC: 1.36 MG/DL (ref 0.76–1.27)
CYTO UR: NORMAL
DEPRECATED RDW RBC AUTO: 44.1 FL (ref 37–54)
EGFRCR SERPLBLD CKD-EPI 2021: 50.1 ML/MIN/1.73
EOSINOPHIL # BLD AUTO: 0 10*3/MM3 (ref 0–0.4)
EOSINOPHIL NFR BLD AUTO: 0 % (ref 0.3–6.2)
ERYTHROCYTE [DISTWIDTH] IN BLOOD BY AUTOMATED COUNT: 12.4 % (ref 12.3–15.4)
GLOBULIN UR ELPH-MCNC: 2.8 GM/DL
GLUCOSE BLDC GLUCOMTR-MCNC: 182 MG/DL (ref 70–130)
GLUCOSE BLDC GLUCOMTR-MCNC: 191 MG/DL (ref 70–130)
GLUCOSE BLDC GLUCOMTR-MCNC: 200 MG/DL (ref 70–130)
GLUCOSE BLDC GLUCOMTR-MCNC: 251 MG/DL (ref 70–130)
GLUCOSE SERPL-MCNC: 202 MG/DL (ref 65–99)
HCT VFR BLD AUTO: 35.5 % (ref 37.5–51)
HGB BLD-MCNC: 11.9 G/DL (ref 13–17.7)
IMM GRANULOCYTES # BLD AUTO: 0.18 10*3/MM3 (ref 0–0.05)
IMM GRANULOCYTES NFR BLD AUTO: 1.3 % (ref 0–0.5)
LAB AP CASE REPORT: NORMAL
LYMPHOCYTES # BLD AUTO: 0.48 10*3/MM3 (ref 0.7–3.1)
LYMPHOCYTES NFR BLD AUTO: 3.4 % (ref 19.6–45.3)
MAGNESIUM SERPL-MCNC: 2.7 MG/DL (ref 1.6–2.4)
MCH RBC QN AUTO: 32.6 PG (ref 26.6–33)
MCHC RBC AUTO-ENTMCNC: 33.5 G/DL (ref 31.5–35.7)
MCV RBC AUTO: 97.3 FL (ref 79–97)
MONOCYTES # BLD AUTO: 1.2 10*3/MM3 (ref 0.1–0.9)
MONOCYTES NFR BLD AUTO: 8.5 % (ref 5–12)
NEUTROPHILS NFR BLD AUTO: 12.16 10*3/MM3 (ref 1.7–7)
NEUTROPHILS NFR BLD AUTO: 86.6 % (ref 42.7–76)
NRBC BLD AUTO-RTO: 0 /100 WBC (ref 0–0.2)
PATH REPORT.FINAL DX SPEC: NORMAL
PATH REPORT.GROSS SPEC: NORMAL
PHOSPHATE SERPL-MCNC: 2.6 MG/DL (ref 2.5–4.5)
PLATELET # BLD AUTO: 198 10*3/MM3 (ref 140–450)
PMV BLD AUTO: 10.3 FL (ref 6–12)
POTASSIUM SERPL-SCNC: 4.3 MMOL/L (ref 3.5–5.2)
PROT SERPL-MCNC: 5.8 G/DL (ref 6–8.5)
RBC # BLD AUTO: 3.65 10*6/MM3 (ref 4.14–5.8)
SODIUM SERPL-SCNC: 134 MMOL/L (ref 136–145)
WBC NRBC COR # BLD AUTO: 14.05 10*3/MM3 (ref 3.4–10.8)

## 2024-08-20 PROCEDURE — 99232 SBSQ HOSP IP/OBS MODERATE 35: CPT | Performed by: STUDENT IN AN ORGANIZED HEALTH CARE EDUCATION/TRAINING PROGRAM

## 2024-08-20 PROCEDURE — 85025 COMPLETE CBC W/AUTO DIFF WBC: CPT | Performed by: STUDENT IN AN ORGANIZED HEALTH CARE EDUCATION/TRAINING PROGRAM

## 2024-08-20 PROCEDURE — 63710000001 INSULIN LISPRO (HUMAN) PER 5 UNITS: Performed by: STUDENT IN AN ORGANIZED HEALTH CARE EDUCATION/TRAINING PROGRAM

## 2024-08-20 PROCEDURE — 80053 COMPREHEN METABOLIC PANEL: CPT | Performed by: STUDENT IN AN ORGANIZED HEALTH CARE EDUCATION/TRAINING PROGRAM

## 2024-08-20 PROCEDURE — 25010000002 PIPERACILLIN SOD-TAZOBACTAM PER 1 G: Performed by: STUDENT IN AN ORGANIZED HEALTH CARE EDUCATION/TRAINING PROGRAM

## 2024-08-20 PROCEDURE — 97116 GAIT TRAINING THERAPY: CPT

## 2024-08-20 PROCEDURE — 99024 POSTOP FOLLOW-UP VISIT: CPT | Performed by: STUDENT IN AN ORGANIZED HEALTH CARE EDUCATION/TRAINING PROGRAM

## 2024-08-20 PROCEDURE — 83735 ASSAY OF MAGNESIUM: CPT | Performed by: STUDENT IN AN ORGANIZED HEALTH CARE EDUCATION/TRAINING PROGRAM

## 2024-08-20 PROCEDURE — 84100 ASSAY OF PHOSPHORUS: CPT | Performed by: STUDENT IN AN ORGANIZED HEALTH CARE EDUCATION/TRAINING PROGRAM

## 2024-08-20 PROCEDURE — 82948 REAGENT STRIP/BLOOD GLUCOSE: CPT

## 2024-08-20 RX ORDER — HALOPERIDOL 1 MG/1
1 TABLET ORAL 2 TIMES DAILY
Status: DISCONTINUED | OUTPATIENT
Start: 2024-08-20 | End: 2024-08-21 | Stop reason: HOSPADM

## 2024-08-20 RX ORDER — HALOPERIDOL 5 MG/ML
2 INJECTION INTRAMUSCULAR EVERY 6 HOURS PRN
Status: DISCONTINUED | OUTPATIENT
Start: 2024-08-20 | End: 2024-08-21 | Stop reason: HOSPADM

## 2024-08-20 RX ADMIN — PIPERACILLIN AND TAZOBACTAM 3.38 G: 3; .375 INJECTION, POWDER, FOR SOLUTION INTRAVENOUS at 09:57

## 2024-08-20 RX ADMIN — SENNOSIDES AND DOCUSATE SODIUM 1 TABLET: 50; 8.6 TABLET ORAL at 20:07

## 2024-08-20 RX ADMIN — INSULIN LISPRO 2 UNITS: 100 INJECTION, SOLUTION INTRAVENOUS; SUBCUTANEOUS at 19:04

## 2024-08-20 RX ADMIN — HALOPERIDOL 1 MG: 1 TABLET ORAL at 20:06

## 2024-08-20 RX ADMIN — INSULIN LISPRO 2 UNITS: 100 INJECTION, SOLUTION INTRAVENOUS; SUBCUTANEOUS at 09:57

## 2024-08-20 RX ADMIN — OXYCODONE HYDROCHLORIDE AND ACETAMINOPHEN 500 MG: 500 TABLET ORAL at 09:56

## 2024-08-20 RX ADMIN — PIPERACILLIN AND TAZOBACTAM 3.38 G: 3; .375 INJECTION, POWDER, FOR SOLUTION INTRAVENOUS at 02:05

## 2024-08-20 RX ADMIN — ATENOLOL 12.5 MG: 25 TABLET ORAL at 09:56

## 2024-08-20 RX ADMIN — INSULIN LISPRO 6 UNITS: 100 INJECTION, SOLUTION INTRAVENOUS; SUBCUTANEOUS at 12:46

## 2024-08-20 RX ADMIN — SENNOSIDES AND DOCUSATE SODIUM 1 TABLET: 50; 8.6 TABLET ORAL at 09:56

## 2024-08-20 RX ADMIN — POLYETHYLENE GLYCOL 3350 17 G: 17 POWDER, FOR SOLUTION ORAL at 09:57

## 2024-08-20 RX ADMIN — Medication 1 TABLET: at 09:56

## 2024-08-20 RX ADMIN — RANOLAZINE 500 MG: 500 TABLET, FILM COATED, EXTENDED RELEASE ORAL at 09:57

## 2024-08-20 RX ADMIN — RANOLAZINE 500 MG: 500 TABLET, FILM COATED, EXTENDED RELEASE ORAL at 20:06

## 2024-08-20 RX ADMIN — INSULIN LISPRO 4 UNITS: 100 INJECTION, SOLUTION INTRAVENOUS; SUBCUTANEOUS at 21:41

## 2024-08-20 NOTE — THERAPY TREATMENT NOTE
Patient Name: Shaheen Saini  : 1935    MRN: [id number]                              Today's Date: 2024       Admit Date: 2024    Visit Dx:     ICD-10-CM ICD-9-CM   1. Acute cholecystitis  K81.0 575.0   2. Hyponatremia  E87.1 276.1   3. TOM (acute kidney injury)  N17.9 584.9     Patient Active Problem List   Diagnosis    Diabetes mellitus    Hyperlipidemia    Hypertension    GERD (gastroesophageal reflux disease)    CAD (coronary artery disease)    History of echocardiogram    History of cardiac cath    History of coronary artery bypass surgery    Mild aortic insufficiency    Cellulitis of right lower extremity    Type 2 diabetes mellitus, without long-term current use of insulin    PAF (paroxysmal atrial fibrillation)    Gangrenous cholecystitis    ARF (acute renal failure)    Hyponatremia    Leukocytosis     Past Medical History:   Diagnosis Date    CAD (coronary artery disease)     Diabetes mellitus     GERD (gastroesophageal reflux disease)     History of cardiac cath     12 - LVEF 65%. 1+ MV insufficiency. Three vessel CAD. Three of four bypass grafts remain patent. The vein graft to the third marginal branch is a previously documented occlusion. The distal circumflex is unchanged from angiogram 09. 09 - LVEF 65%.Three vessel CAD s/p CABG. One fraft to marginal branch is occluded, the rest of the grafts are patent. The marginal branch is diffusely dis    History of echocardiogram     18 - LA mildly dilated. Normal LV function. Mild MR. Mild-moderate TR. Mild AR. 2012 - EF 60-65%. Moderate MI, TI, and PI. Mild AI.    Hyperlipidemia     Hypertension     Paroxysmal atrial fibrillation      Past Surgical History:   Procedure Laterality Date    CHOLECYSTECTOMY WITH INTRAOPERATIVE CHOLANGIOGRAM N/A 2024    Procedure: CHOLECYSTECTOMY LAPAROSCOPIC INTRAOPERATIVE CHOLANGIOGRAM;  Surgeon: Inocencia Vieira MD;  Location: [facility];  Service: General;  Laterality: N/A;     CORONARY ARTERY BYPASS GRAFT  09/06/2005    CABG x4. Internal mammary artery to LAD, vein graft to diagonal artery, vein graft to OM, vein graft to posterior descending artery.      General Information       Row Name 08/20/24 1609          Physical Therapy Time and Intention    Document Type therapy note (daily note)  -EB     Mode of Treatment physical therapy;individual therapy  -       Row Name 08/20/24 1609          General Information    Patient Profile Reviewed yes  -     Existing Precautions/Restrictions fall  J. P drain  -       Row Name 08/20/24 1609          Cognition    Orientation Status (Cognition) oriented to;person;place  -       Row Name 08/20/24 1609          Safety Issues, Functional Mobility    Impairments Affecting Function (Mobility) endurance/activity tolerance;strength  -               User Key  (r) = Recorded By, (t) = Taken By, (c) = Cosigned By      Initials Name Provider Type    EB Priya Menendez PTA Physical Therapist Assistant                   Mobility       [facility] Name 08/20/24 1609          Bed Mobility    Supine-Sit [place] (Bed Mobility) not tested  -     Sit-Supine [place] (Bed Mobility) standby assist  -EB     Comment, (Bed Mobility) pt in bathroom when PT arrived  -       Row Name 08/20/24 1609          Sit-Stand Transfer    Sit-Stand [place] (Transfers) contact guard  -     Assistive Device (Sit-Stand Transfers) walker, front-wheeled  -       Row Name 08/20/24 1609          Gait/Stairs (Locomotion)    [place] Level (Gait) contact guard  -EB     Assistive Device (Gait) walker, front-wheeled  -EB     Distance in Feet (Gait) 100  -EB     Deviations/Abnormal Patterns (Gait) santhosh decreased;stride length decreased  -EB     Bilateral Gait Deviations forward flexed posture  -EB     Comment, (Gait/Stairs) cues for upright posture. No unsteadiness or LOB noted.  -EB               User Key  (r) = Recorded By, (t) = Taken By, (c) = Cosigned By       Initials Name Provider Type    Priya Pearl PTA Physical Therapist Assistant                   Obj/Interventions    No documentation.                  Goals/Plan    No documentation.                  Clinical Impression       Row Name 08/20/24 1611          Pain    Pretreatment Pain Rating 0/10 - no pain  -EB     Posttreatment Pain Rating 0/10 - no pain  -EB       Row Name 08/20/24 1611          Plan of Care Review    Plan of Care Reviewed With patient  -EB     Progress improving  -EB     Outcome Evaluation Pt seen for PT tx this AM. Pt in bathroom when PT arrived. Pt required CGA with standing from commode. Pt able to increase gait distance with rwx to 100ft with CGA/SBA. Pt fairly steady and had no LOB. Pt returned to supine with SBA. Will contine to follow and progress pt as able.  -EB       Row Name 08/20/24 1611          Therapy Assessment/Plan (PT)    Therapy Frequency (PT) 6 times/wk  -EB       Row Name 08/20/24 1611          Positioning and Restraints    Pre-Treatment Position bathroom  -EB     Post Treatment Position bed  -EB     In Bed supine;call light within reach;encouraged to call for assist;exit alarm on  HOB elevated.  -EB               User Key  (r) = Recorded By, (t) = Taken By, (c) = Cosigned By      Initials Name Provider Type    Priya Pearl PTA Physical Therapist Assistant                   Outcome Measures       Row Name 08/20/24 1610          How much help from another person do you currently need...    Turning from your back to your side while in flat bed without using bedrails? 3  -EB     Moving from lying on back to sitting on the side of a flat bed without bedrails? 3  -EB     Moving to and from a bed to a chair (including a wheelchair)? 3  -EB     Standing up from a chair using your arms (e.g., wheelchair, bedside chair)? 3  -EB     Climbing 3-5 steps with a railing? 2  -EB     To walk in hospital room? 3  -EB     AM-PAC 6 Clicks Score (PT) 17  -EB     Highest Level of Mobility  Goal 5 --> Static standing  -EB               User Key  (r) = Recorded By, (t) = Taken By, (c) = Cosigned By      Initials Name Provider Type    Priya Pearl PTA Physical Therapist Assistant                                 Physical Therapy Education       Title: PT OT SLP Therapies (In Progress)       Topic: Physical Therapy (In Progress)       Point: Mobility training (Done)       Learning Progress Summary             Patient Acceptance, E,D, VU,NR by MS at 8/19/2024 1111                         Point: Home exercise program (Not Started)       Learner Progress:  Not documented in this visit.              Point: Body mechanics (Done)       Learning Progress Summary             Patient Acceptance, E,D, VU,NR by MS at 8/19/2024 1111                         Point: Precautions (Done)       Learning Progress Summary             Patient Acceptance, E,D, VU,NR by MS at 8/19/2024 1111                                         User Key       Initials Effective Dates Name Provider Type Discipline    MS 06/16/21 -  Bill Ye PT Physical Therapist PT                  PT Recommendation and Plan     Plan of Care Reviewed With: patient  Progress: improving  Outcome Evaluation: Pt seen for PT tx this AM. Pt in bathroom when PT arrived. Pt required CGA with standing from commode. Pt able to increase gait distance with rwx to 100ft with CGA/SBA. Pt fairly steady and had no LOB. Pt returned to supine with SBA. Will contine to follow and progress pt as able.     Time Calculation:         PT Charges       Row Name 08/20/24 1610             Time Calculation    Start Time 1508  -EB      Stop Time 1522  -EB      Time Calculation (min) 14 min  -EB      PT Received On 08/20/24  -EB      PT - Next Appointment 08/21/24  -EB         Time Calculation- PT    Total Timed Code Minutes- PT 14 minute(s)  -EB                User Key  (r) = Recorded By, (t) = Taken By, (c) = Cosigned By      Initials Name Provider Type    Priya Pearl  PTA Physical Therapist Assistant                  Therapy Charges for Today       Code Description Service Date Service Provider Modifiers Qty    [id number] HC GAIT TRAINING EA 15 MIN 8/20/2024 Priya Menendez, VEENA GP 1            PT G-Codes  Outcome Measure Options: AM-PAC 6 Clicks Basic Mobility (PT)  AM-PAC 6 Clicks Score (PT): 17  PT Discharge Summary  Anticipated Discharge Disposition (PT): home with assist, home with outpatient therapy services    Priya Menendez PTA  8/20/2024

## 2024-08-20 NOTE — PROGRESS NOTES
LOS: 3 days   Patient Care Team:  Pal Lawrence MD as PCP - General (Family Medicine)  Tommy Ellis MD as Consulting Physician (Cardiology)    Chief Complaint:  Following post cholecystectomy    Interval History:   Intermittently confused per family at bedside.  Otherwise no complaints.  No chest pain.  Pleasantly confused, he asked me if I was interested in purchasing land for the [facility].    Objective   Vital Signs  Temp:  [97.3 °F (36.3 °C)-98.4 °F (36.9 °C)] 97.3 °F (36.3 °C)  Heart Rate:  [50-69] 50  Resp:  [18] 18  BP: (121-160)/(50-85) 121/50    Intake/Output Summary (Last 24 hours) at 8/20/2024 0736  Last data filed at 8/19/2024 1744  Gross per 24 hour   Intake 1160 ml   Output 790 ml   Net 370 ml       Comfortable NAD, resting in bed, pleasantly confused  PERRL, conjunctivae clear  Neck supple, no JVD or thyromegaly appreciated  S1/S2 RRR, no m/r/g  Lungs CTA B, normal effort  Abdomen S/NT/ND (+) BS, no HSM appreciated  Extremities warm, no clubbing, cyanosis, trace lower extremity edema   No visible or palpable skin lesions  A/O x 1, mood and affect appropriate    Results Review:      Results from last 7 days   Lab Units 08/20/24  0245 08/19/24  0338 08/18/24  0255   SODIUM mmol/L 134* 128* 129*   POTASSIUM mmol/L 4.3 3.9 4.0   CHLORIDE mmol/L 101 96* 97*   CO2 mmol/L 23.0 17.4* 18.0*   BUN mg/dL 57* 43* 32*   CREATININE mg/dL 1.36* 1.30* 1.26   GLUCOSE mg/dL 202* 202* 75   CALCIUM mg/dL 8.8 9.3 8.8     Results from last 7 days   Lab Units 08/17/24  1144 08/17/24  0821   HSTROP T ng/L 37* 29*     Results from last 7 days   Lab Units 08/20/24  0245 08/19/24  0338 08/18/24  0255   WBC 10*3/mm3 14.05* 14.40* 14.20*   HEMOGLOBIN g/dL 11.9* 13.9 14.9   HEMATOCRIT % 35.5* 40.2 43.8   PLATELETS 10*3/mm3 198 185 165             Results from last 7 days   Lab Units 08/20/24  0245   MAGNESIUM mg/dL 2.7*           I reviewed the patient's new clinical results.  I personally viewed and  interpreted the patient's EKG/Telemetry data        Medication Review:   vitamin C, 500 mg, Oral, Daily  [Held by provider] aspirin, 81 mg, Oral, Daily  atenolol, 12.5 mg, Oral, Daily  insulin lispro, 2-9 Units, Subcutaneous, 4x Daily AC & at Bedtime  [Held by provider] lisinopril, 5 mg, Oral, Daily  multivitamin with minerals, 1 tablet, Oral, Daily  oxazepam, 15 mg, Oral, Daily  piperacillin-tazobactam, 3.375 g, Intravenous, Q8H  polyethylene glycol, 17 g, Oral, Daily  ranolazine, 500 mg, Oral, BID  senna-docusate sodium, 1 tablet, Oral, BID             Assessment & Plan       Gangrenous cholecystitis    Hyperlipidemia    Hypertension    CAD (coronary artery disease)    History of coronary artery bypass surgery    Type 2 diabetes mellitus, without long-term current use of insulin    PAF (paroxysmal atrial fibrillation)    ARF (acute renal failure)    Hyponatremia    Leukocytosis    Acute gangrenous cholecystitis status postcholecystectomy 8/18/2024  Surgery following  Coronary artery disease, stable.  Echo unremarkable with some mild pulmonary hypertension but otherwise unremarkable, perhaps that had worsened due to sepsis from his gallbladder issues.  Not actively contributing to his presentation.  History of paroxysmal atrial fibrillation.  It does not appear he is on anticoagulation as outpatient.  Continue discussions as outpatient.  He has been in sinus rhythm here on ECGs.,  No urgent need for anticoagulation right now  Hyponatremia.  Euvolemic.  Probably related to cholecystitis.  Medicine team following.  No signs of overload.  Echo unremarkable.    Cardiac status is stable, we will see as needed.  Please reach out any questions or concerns    Jus Grayson MD  08/20/24  07:36 EDT      Part of this note may be an electronic transcription/translation of spoken language to printed text using the Dragon Dictation System.

## 2024-08-20 NOTE — PROGRESS NOTES
Name: Shaheen Saini ADMIT: 2024   : 1935  PCP: Pal Lawrence MD    MRN: [id number] LOS: 3 days   AGE/SEX: 89 y.o. male  ROOM: [facility]     Subjective   Subjective   Patient is seen at bedside, he appears more confused today.       Objective   Objective   Vital Signs  Temp:  [97.3 °F (36.3 °C)-98.4 °F (36.9 °C)] 97.5 °F (36.4 °C)  Heart Rate:  [50-56] 50  Resp:  [18] 18  BP: (121-145)/(50-62) 121/53  SpO2:  [97 %-98 %] 98 %  on   ;   Device (Oxygen Therapy): room air  Body mass index is 30.23 kg/m².  Physical Exam  General, awake ,   Mildly confused  Head and ENT, normocephalic and atraumatic.  Lungs, symmetric expansion, equal air entry bilaterally.  Heart, regular rate and rhythm.  Abdomen, soft, surgical drain in place  Extremities, no clubbing or cyanosis.  Neuro, no focal deficits.  Skin: Warm and no rash.  Psych, has hallucinations  Musculoskeletal, joint examination is grossly normal.     Copied text material from yesterday's note has been reviewed for appropriate changes and remains accurate as of 24.         Results Review     I reviewed the patient's new clinical results.  Results from last 7 days   Lab Units 24  0245 24  0338 24  0255 24  0821   WBC 10*3/mm3 14.05* 14.40* 14.20* 16.65*   HEMOGLOBIN g/dL 11.9* 13.9 14.9 14.1   PLATELETS 10*3/mm3 198 185 165 161     Results from last 7 days   Lab Units 24  0245 24  0338 24  0255 24  1717   SODIUM mmol/L 134* 128* 129* 130*   POTASSIUM mmol/L 4.3 3.9 4.0 4.3   CHLORIDE mmol/L 101 96* 97* 96*   CO2 mmol/L 23.0 17.4* 18.0* 22.0   BUN mg/dL 57* 43* 32* 33*   CREATININE mg/dL 1.36* 1.30* 1.26 1.51*   GLUCOSE mg/dL 202* 202* 75 88   EGFR mL/min/1.73 50.1* 52.8* 54.9* 44.1*     Results from last 7 days   Lab Units 24  0245 24  0338 24  0255 24  0821   ALBUMIN g/dL 3.0* 3.2* 3.1* 3.6   BILIRUBIN mg/dL 0.6 0.9 0.9 0.9   ALK PHOS U/L 106 98 92 80   AST (SGOT) U/L 89*  83* 73* 64*   ALT (SGPT) U/L 67* 62* 47* 47*     Results from last 7 days   Lab Units 08/20/24  0245 08/19/24  0338 08/18/24  0255 08/17/24  1717 08/17/24  0821   CALCIUM mg/dL 8.8 9.3 8.8 9.1 8.9   ALBUMIN g/dL 3.0* 3.2* 3.1*  --  3.6   MAGNESIUM mg/dL 2.7* 2.9* 2.5*  --   --    PHOSPHORUS mg/dL 2.6 3.6 2.8  --   --      Results from last 7 days   Lab Units 08/17/24  0952   LACTATE mmol/L 1.9     Hemoglobin A1C   Date/Time Value Ref Range Status   08/18/2024 0656 6.70 (H) 4.80 - 5.60 % Final     Glucose   Date/Time Value Ref Range Status   08/20/2024 1207 251 (H) 70 - 130 mg/dL Final   08/20/2024 0746 191 (H) 70 - 130 mg/dL Final   08/19/2024 2048 243 (H) 70 - 130 mg/dL Final   08/19/2024 1613 279 (H) 70 - 130 mg/dL Final   08/19/2024 1142 219 (H) 70 - 130 mg/dL Final   08/19/2024 0754 199 (H) 70 - 130 mg/dL Final   08/18/2024 2120 227 (H) 70 - 130 mg/dL Final       No radiology results for the last day    I have personally reviewed all medications:  Scheduled Medications  vitamin C, 500 mg, Oral, Daily  [Held by provider] aspirin, 81 mg, Oral, Daily  atenolol, 12.5 mg, Oral, Daily  insulin lispro, 2-9 Units, Subcutaneous, 4x Daily AC & at Bedtime  [Held by provider] lisinopril, 5 mg, Oral, Daily  multivitamin with minerals, 1 tablet, Oral, Daily  oxazepam, 15 mg, Oral, Daily  polyethylene glycol, 17 g, Oral, Daily  ranolazine, 500 mg, Oral, BID  senna-docusate sodium, 1 tablet, Oral, BID    Infusions   Diet  Diet: Regular/House; Fluid Consistency: Thin (IDDSI 0)    I have personally reviewed:  [x]  Laboratory   [x]  Microbiology   [x]  Radiology   [x]  EKG/Telemetry  [x]  Cardiology/Vascular   []  Pathology    []  Records       Assessment/Plan     Active Hospital Problems    Diagnosis  POA    **Gangrenous cholecystitis [K81.0]  Yes    ARF (acute renal failure) [N17.9]  Yes    Hyponatremia [E87.1]  Yes    Leukocytosis [D72.829]  Yes    Type 2 diabetes mellitus, without long-term current use of insulin [E11.9]  Yes     PAF (paroxysmal atrial fibrillation) [I48.0]  Yes    History of coronary artery bypass surgery [Z95.1]  Not Applicable    Hyperlipidemia [E78.5]  Yes    Hypertension [I10]  Yes    CAD (coronary artery disease) [I25.10]  Yes      Resolved Hospital Problems   No resolved problems to display.       89 y.o. male admitted with Gangrenous cholecystitis.    Acute cholecystitis  Leukocytosis  -CTA without PE; multiple gallstones with inflammatory change suspicious for acute cholecystitis  -General Surgery following- s/p lap dinah , surgery following, follow management recommendations.  -Was on Zosyn which has been stopped today.     TOM, improved  -Renal function mildly worse, I suspect he has underlying chronic kidney disease, appears close to baseline.  Had hyponatremia on labs which has been improving.     DM2 w/ hyperglycemia  -Continue Accu-Cheks and sliding scale insulin coverage.     Hyponatremia  -Sodium levels have improved, repeat labs.     CAD s/p CABG  HTN  Dyspnea  Elevated troponin  -Chronic conditions, no acute chest pain reported.     R knee 'giving out'  -X-ray shows possible pseudogout, he does not any tenderness and denies pain  -Reassess postop with PT    Confusion and hallucinations, consult psychiatry.          DVT prophylaxis: SCDs  Discussed with patient and family.  Patient may likely require rehab placement upon medical stability.      Chaparro Quiroz MD  [facility]  08/20/24  12:21 EDT

## 2024-08-20 NOTE — PROGRESS NOTES
"General Surgery Progress Note    CC: gangrenous cholecystitis    S: Patient denies nausea, vomiting, abdominal pain.  Has been tolerating regular diet.  Has been out of bed with PT and is up in the chair.  Had a bowel movement.  Per nurse, he pulled out his drain this morning.  He has a psych consult pending due to intermittent confusion.  Per the patient's daughter, the patient had a conversation on the phone this morning that was very confused.    O:/53 (BP Location: Left arm, Patient Position: Lying)   Pulse 50   Temp 97.5 °F (36.4 °C) (Oral)   Resp 18   Ht 177 cm (69.69\")   Wt 94.7 kg (208 lb 12.4 oz)   SpO2 98%   BMI 30.23 kg/m²     Intake & Output (last day)         08/19 0701 08/20 0700 08/20 0701 08/21 0700    P.O. 1160 240    I.V. (mL/kg)      Total Intake(mL/kg) 1160 (12.2) 240 (2.5)    Urine (mL/kg/hr) 750 (0.3)     Drains 40     Total Output 790     Net +370 +240          Urine Unmeasured Occurrence 2 x 1 x    Stool Unmeasured Occurrence 1 x 1 x            GENERAL: awake, alert, interactive, cooperative  HEENT: EOMI, clear sclera, moist mucus membranes   CHEST: normal work of breathing  CARDIAC: well perfused  GI: Abd soft, moderately distended, appropriately tender around incisions which are clean and dry with skin glue in place, right upper quadrant drain has been completely dislodged and is on the floor containing a small amount of serosanguineous fluid, his drain site is dry without signs of infection  EXTREMITIES: RIOS, no cyanosis    SKIN: Warm and dry, no rash    LABS  Results from last 7 days   Lab Units 08/20/24  0245 08/19/24  0338 08/18/24  0255   WBC 10*3/mm3 14.05* 14.40* 14.20*   HEMOGLOBIN g/dL 11.9* 13.9 14.9   HEMATOCRIT % 35.5* 40.2 43.8   PLATELETS 10*3/mm3 198 185 165     Results from last 7 days   Lab Units 08/20/24  0245 08/19/24  0338 08/18/24  0255   SODIUM mmol/L 134* 128* 129*   POTASSIUM mmol/L 4.3 3.9 4.0   CHLORIDE mmol/L 101 96* 97*   CO2 mmol/L 23.0 17.4* " 18.0*   BUN mg/dL 57* 43* 32*   CREATININE mg/dL 1.36* 1.30* 1.26   CALCIUM mg/dL 8.8 9.3 8.8   BILIRUBIN mg/dL 0.6 0.9 0.9   ALK PHOS U/L 106 98 92   ALT (SGPT) U/L 67* 62* 47*   AST (SGOT) U/L 89* 83* 73*   GLUCOSE mg/dL 202* 202* 75           IMAGING:  IOC report reviewed along with images, there is a small filling defect within the distal common bile duct throughout the exam suspicious for possible choledocholithiasis, further evaluation with MRI and MRCP of the abdomen was recommended    A/P: 89 y.o. male with cholelithiasis with acute gangrenous cholecystitis and possible choledocholithiasis.    Postop day 2 from laparoscopic cholecystectomy with intraoperative cholangiogram and right upper quadrant drain placement  AVSS.  Abdominal exam appropriate postop.    Continue regular diet.  Transaminases bili mildly elevated with no T. bili elevation.  Expected postop given the amount of inflammation he had at his liver bed.  IOC results noted, at this point I do not have suspicion for an obstructing distal CBD stone and will hold off further imaging.  If clinical change or LFTs increase, would recommend consulting GI, but right now we can hold off.  Has completed greater than 24 hours of Zosyn postop.  Can discontinue this today.  Continue scheduled miralax and docusate senna.  Continue RUQ drain. Monitor strict I/O.  Encourage IS, out of bed with assistance, PT.  Delirium precautions.  Psych consulted per primary team for confusion.  SCDs ordered.  OK for DVT ppx and to restart home aspirin.      Inocencia Vieira MD  General, Robotic and Endoscopic Surgery  [facility]    [address], [address]  [place], [place], [address]  P: [phone]  F: [phone]

## 2024-08-20 NOTE — PLAN OF CARE
Goal Outcome Evaluation:  Plan of Care Reviewed With: patient        Progress: improving  Outcome Evaluation: Pt seen for PT tx this AM. Pt in bathroom when PT arrived. Pt required CGA with standing from commode. Pt able to increase gait distance with rwx to 100ft with CGA/SBA. Pt fairly steady and had no LOB. Pt returned to supine with SBA. Will contine to follow and progress pt as able.      Anticipated Discharge Disposition (PT): home with assist, home with outpatient therapy services

## 2024-08-20 NOTE — PLAN OF CARE
Goal Outcome Evaluation:  Plan of Care Reviewed With: patient         Pt very confused at times but cooperative and easily redirected. Heart rate has maintained between 35-45 all this shift, with no s/s of distress, safety maintained, continue plan of care.

## 2024-08-20 NOTE — PLAN OF CARE
Goal Outcome Evaluation:              Outcome Evaluation: Pt VSS but still remains lucinda today w/2 separate events of SVT. sat in chair most of the day, pt accidentally pulled his own DREA drain out today, Dr. Vieira saw and is ok with that. pt still somewhat confused today but very alert. daughter would like pt to go home with HH, PT following, safety maintained, will CTM cl

## 2024-08-20 NOTE — CONSULTS
IDENTIFYING INFORMATION: The patient is an 89-year-old white male admitted for treatment of gangrenous cholecystitis.  He is seen by psychiatry related to visual hallucinations and confusion    CHIEF COMPLAINT: None given    INFORMANT: Patient staff and chart    RELIABILITY: Fair    HISTORY OF PRESENT ILLNESS: The patient is an 89-year-old white male with no reported history of psychiatric treatment for dementia.  He was admitted on 8/17/2024 with gangrenous cholecystitis.  Postoperatively, the patient developed confusion..  During interview he is pleasant cooperative but continues to report visual hallucinations.  He is oriented to person only ..  The patient denies prior psychiatric contact and is on no prescribed psychotropic medications.  He denies recent changes in mood sleep or appetite and denies suicidal or homicidal ideation.  He denies abuse of any psychoactive substances.    PAST PSYCHIATRIC HISTORY: None reported    PAST MEDICAL HISTORY: Significant for gangrenous cholecystitis, acute renal failure, hyponatremia, leukocytosis, diabetes mellitus, paroxysmal atrial fibrillation, history of coronary artery bypass, hyperlipidemia, hypertension, coronary artery disease    MEDICATIONS:   Current Facility-Administered Medications   Medication Dose Route Frequency Provider Last Rate Last Admin    ascorbic acid (VITAMIN C) tablet 500 mg  500 mg Oral Daily Inocencia Vieira MD   500 mg at 08/20/24 0956    [Held by provider] aspirin EC tablet 81 mg  81 mg Oral Daily Inocencia Vieira MD        atenolol (TENORMIN) tablet 12.5 mg  12.5 mg Oral Daily Inocencia Vieira MD   12.5 mg at 08/20/24 0956    dextrose (D50W) (25 g/50 mL) IV injection 25 g  25 g Intravenous Q15 Min PRN Inocencia Vieira MD        dextrose (GLUTOSE) oral gel 15 g  15 g Oral Q15 Min PRN Inocencia Vieira MD        glucagon (GLUCAGEN) injection 1 mg  1 mg Intramuscular Q15 Min PRN Inocencia Vieira MD        haloperidol (HALDOL) tablet 1 mg  1 mg  Oral BID Dewayne Baker III, MD        HYDROcodone-acetaminophen (NORCO) 5-325 MG per tablet 1 tablet  1 tablet Oral Q4H PRN Inocencia Vieira MD        insulin lispro (HUMALOG/ADMELOG) injection 2-9 Units  2-9 Units Subcutaneous 4x Daily AC & at Bedtime Inocencia Vieira MD   6 Units at 08/20/24 1246    [Held by provider] lisinopril (PRINIVIL,ZESTRIL) tablet 5 mg  5 mg Oral Daily Ryan Moreland MD        melatonin tablet 5 mg  5 mg Oral Nightly PRN Inocencia Vieira MD   5 mg at 08/17/24 2248    multivitamin with minerals 1 tablet  1 tablet Oral Daily Inocencia Vieira MD   1 tablet at 08/20/24 0956    ondansetron ODT (ZOFRAN-ODT) disintegrating tablet 4 mg  4 mg Oral Q6H PRN Inocencia Vieira MD        Or    ondansetron (ZOFRAN) injection 4 mg  4 mg Intravenous Q6H PRN Inocencia Vieira MD   4 mg at 08/18/24 1204    oxazepam (SERAX) capsule 15 mg  15 mg Oral Daily Inocencia Vieira MD   15 mg at 08/18/24 0834    polyethylene glycol (MIRALAX) packet 17 g  17 g Oral Daily Inocencia Vieira MD   17 g at 08/20/24 0957    ranolazine (RANEXA) 12 hr tablet 500 mg  500 mg Oral BID Inocencia Vieira MD   500 mg at 08/20/24 0957    sennosides-docusate (PERICOLACE) 8.6-50 MG per tablet 1 tablet  1 tablet Oral BID Inocencia Vieira MD   1 tablet at 08/20/24 0956    sodium chloride 0.9 % flush 10 mL  10 mL Intravenous PRN Inocencia Vieira MD   10 mL at 08/17/24 0821         ALLERGIES: None    FAMILY HISTORY: Noncontributory    SOCIAL HISTORY: No reported use of alcohol tobacco or street drugs field    MENTAL STATUS EXAM: The patient is an elderly white male who is in no apparent physical distress.  He is awake alert and oriented to person only.  His mood is euthymic his affect full range.  Speech is generally relevant and coherent.  The patient does not comply with formal testing of memory and cognition.  He denies current suicidal or homicidal ideation.   he exhibits significant disorganization of  thought.  His judgment insight are significantly impaired.    IMP: Metabolic encephalopathy, most likely postoperative in etiology, medical problems as described previously  PLAN: I would expect the patient's mentation to return to baseline within the next 2 to 4 days as it appears as though he is having a postoperative delirium.  In the meantime I have ordered low-dose haloperidol scheduled and will also add a as needed dose should the patient experience any agitation.

## 2024-08-21 ENCOUNTER — READMISSION MANAGEMENT (OUTPATIENT)
Dept: CALL CENTER | Facility: HOSPITAL | Age: 89
End: 2024-08-21
Payer: MEDICARE

## 2024-08-21 VITALS
HEART RATE: 56 BPM | OXYGEN SATURATION: 98 % | HEIGHT: 70 IN | RESPIRATION RATE: 20 BRPM | TEMPERATURE: 98.2 F | BODY MASS INDEX: 29.07 KG/M2 | DIASTOLIC BLOOD PRESSURE: 56 MMHG | SYSTOLIC BLOOD PRESSURE: 139 MMHG | WEIGHT: 203.1 LBS

## 2024-08-21 PROBLEM — E87.1 HYPONATREMIA: Status: RESOLVED | Noted: 2024-08-17 | Resolved: 2024-08-21

## 2024-08-21 PROBLEM — D72.829 LEUKOCYTOSIS: Status: RESOLVED | Noted: 2024-08-17 | Resolved: 2024-08-21

## 2024-08-21 PROBLEM — N17.9 ARF (ACUTE RENAL FAILURE): Status: RESOLVED | Noted: 2024-08-17 | Resolved: 2024-08-21

## 2024-08-21 LAB
ALBUMIN SERPL-MCNC: 2.9 G/DL (ref 3.5–5.2)
ALBUMIN/GLOB SERPL: 1 G/DL
ALP SERPL-CCNC: 101 U/L (ref 39–117)
ALT SERPL W P-5'-P-CCNC: 96 U/L (ref 1–41)
ANION GAP SERPL CALCULATED.3IONS-SCNC: 10.3 MMOL/L (ref 5–15)
AST SERPL-CCNC: 95 U/L (ref 1–40)
BASOPHILS # BLD MANUAL: 0 10*3/MM3 (ref 0–0.2)
BASOPHILS NFR BLD MANUAL: 0 % (ref 0–1.5)
BILIRUB SERPL-MCNC: 0.8 MG/DL (ref 0–1.2)
BUN SERPL-MCNC: 50 MG/DL (ref 8–23)
BUN/CREAT SERPL: 37 (ref 7–25)
CALCIUM SPEC-SCNC: 8.7 MG/DL (ref 8.6–10.5)
CHLORIDE SERPL-SCNC: 103 MMOL/L (ref 98–107)
CO2 SERPL-SCNC: 22.7 MMOL/L (ref 22–29)
CREAT SERPL-MCNC: 1.35 MG/DL (ref 0.76–1.27)
DEPRECATED RDW RBC AUTO: 43.2 FL (ref 37–54)
EGFRCR SERPLBLD CKD-EPI 2021: 50.5 ML/MIN/1.73
EOSINOPHIL # BLD MANUAL: 0 10*3/MM3 (ref 0–0.4)
EOSINOPHIL NFR BLD MANUAL: 0 % (ref 0.3–6.2)
ERYTHROCYTE [DISTWIDTH] IN BLOOD BY AUTOMATED COUNT: 12.3 % (ref 12.3–15.4)
GLOBULIN UR ELPH-MCNC: 3 GM/DL
GLUCOSE BLDC GLUCOMTR-MCNC: 160 MG/DL (ref 70–130)
GLUCOSE BLDC GLUCOMTR-MCNC: 191 MG/DL (ref 70–130)
GLUCOSE SERPL-MCNC: 143 MG/DL (ref 65–99)
HCT VFR BLD AUTO: 36.1 % (ref 37.5–51)
HGB BLD-MCNC: 12.1 G/DL (ref 13–17.7)
LYMPHOCYTES # BLD MANUAL: 1.03 10*3/MM3 (ref 0.7–3.1)
LYMPHOCYTES NFR BLD MANUAL: 13 % (ref 5–12)
MAGNESIUM SERPL-MCNC: 2.5 MG/DL (ref 1.6–2.4)
MCH RBC QN AUTO: 32.3 PG (ref 26.6–33)
MCHC RBC AUTO-ENTMCNC: 33.5 G/DL (ref 31.5–35.7)
MCV RBC AUTO: 96.3 FL (ref 79–97)
MONOCYTES # BLD: 1.22 10*3/MM3 (ref 0.1–0.9)
NEUTROPHILS # BLD AUTO: 7.11 10*3/MM3 (ref 1.7–7)
NEUTROPHILS NFR BLD MANUAL: 76 % (ref 42.7–76)
NRBC BLD AUTO-RTO: 0.2 /100 WBC (ref 0–0.2)
PHOSPHATE SERPL-MCNC: 3.1 MG/DL (ref 2.5–4.5)
PLAT MORPH BLD: NORMAL
PLATELET # BLD AUTO: 232 10*3/MM3 (ref 140–450)
PMV BLD AUTO: 10.4 FL (ref 6–12)
POTASSIUM SERPL-SCNC: 4.3 MMOL/L (ref 3.5–5.2)
PROT SERPL-MCNC: 5.9 G/DL (ref 6–8.5)
RBC # BLD AUTO: 3.75 10*6/MM3 (ref 4.14–5.8)
RBC MORPH BLD: NORMAL
SODIUM SERPL-SCNC: 136 MMOL/L (ref 136–145)
VARIANT LYMPHS NFR BLD MANUAL: 11 % (ref 19.6–45.3)
WBC MORPH BLD: NORMAL
WBC NRBC COR # BLD AUTO: 9.36 10*3/MM3 (ref 3.4–10.8)

## 2024-08-21 PROCEDURE — 80053 COMPREHEN METABOLIC PANEL: CPT | Performed by: STUDENT IN AN ORGANIZED HEALTH CARE EDUCATION/TRAINING PROGRAM

## 2024-08-21 PROCEDURE — 97530 THERAPEUTIC ACTIVITIES: CPT

## 2024-08-21 PROCEDURE — 36415 COLL VENOUS BLD VENIPUNCTURE: CPT | Performed by: STUDENT IN AN ORGANIZED HEALTH CARE EDUCATION/TRAINING PROGRAM

## 2024-08-21 PROCEDURE — 83735 ASSAY OF MAGNESIUM: CPT | Performed by: STUDENT IN AN ORGANIZED HEALTH CARE EDUCATION/TRAINING PROGRAM

## 2024-08-21 PROCEDURE — 82948 REAGENT STRIP/BLOOD GLUCOSE: CPT

## 2024-08-21 PROCEDURE — 99024 POSTOP FOLLOW-UP VISIT: CPT | Performed by: STUDENT IN AN ORGANIZED HEALTH CARE EDUCATION/TRAINING PROGRAM

## 2024-08-21 PROCEDURE — 85025 COMPLETE CBC W/AUTO DIFF WBC: CPT | Performed by: STUDENT IN AN ORGANIZED HEALTH CARE EDUCATION/TRAINING PROGRAM

## 2024-08-21 PROCEDURE — 85007 BL SMEAR W/DIFF WBC COUNT: CPT | Performed by: STUDENT IN AN ORGANIZED HEALTH CARE EDUCATION/TRAINING PROGRAM

## 2024-08-21 PROCEDURE — 84100 ASSAY OF PHOSPHORUS: CPT | Performed by: STUDENT IN AN ORGANIZED HEALTH CARE EDUCATION/TRAINING PROGRAM

## 2024-08-21 RX ADMIN — OXYCODONE HYDROCHLORIDE AND ACETAMINOPHEN 500 MG: 500 TABLET ORAL at 10:42

## 2024-08-21 RX ADMIN — HALOPERIDOL 1 MG: 1 TABLET ORAL at 10:42

## 2024-08-21 RX ADMIN — Medication 1 TABLET: at 10:42

## 2024-08-21 RX ADMIN — SENNOSIDES AND DOCUSATE SODIUM 1 TABLET: 50; 8.6 TABLET ORAL at 10:42

## 2024-08-21 RX ADMIN — RANOLAZINE 500 MG: 500 TABLET, FILM COATED, EXTENDED RELEASE ORAL at 10:42

## 2024-08-21 RX ADMIN — ATENOLOL 12.5 MG: 25 TABLET ORAL at 10:42

## 2024-08-21 NOTE — PLAN OF CARE
Goal Outcome Evaluation:  Plan of Care Reviewed With: patient            Pt has rested quietly most of the night, No acute distress noted, safety maintained, continue plan of care

## 2024-08-21 NOTE — CASE MANAGEMENT/SOCIAL WORK
Case Management Discharge Note      Final Note: DC home with [facility] home health, family to transport         Selected Continued Care - Discharged on 8/21/2024 Admission date: 8/17/2024 - Discharge disposition: Home-Health Care Svc      Destination    No services have been selected for the patient.                Durable Medical Equipment    No services have been selected for the patient.                Dialysis/Infusion    No services have been selected for the patient.                Home Medical Care Coordination complete.      Service Provider Selected Services Address Phone Fax Patient Preferred    [facility] [address] [place] [address] [phone] --              Therapy    No services have been selected for the patient.                Community Resources    No services have been selected for the patient.                Community & DME    No services have been selected for the patient.                    Transportation Services  Private: Car    Final Discharge Disposition Code: 06 - home with home health care

## 2024-08-21 NOTE — THERAPY TREATMENT NOTE
Patient Name: Shaheen Saini  : 1935    MRN: [id number]                              Today's Date: 2024       Admit Date: 2024    Visit Dx:     ICD-10-CM ICD-9-CM   1. Acute cholecystitis  K81.0 575.0   2. Hyponatremia  E87.1 276.1   3. TOM (acute kidney injury)  N17.9 584.9     Patient Active Problem List   Diagnosis    Diabetes mellitus    Hyperlipidemia    Hypertension    GERD (gastroesophageal reflux disease)    CAD (coronary artery disease)    History of echocardiogram    History of cardiac cath    History of coronary artery bypass surgery    Mild aortic insufficiency    Cellulitis of right lower extremity    Type 2 diabetes mellitus, without long-term current use of insulin    PAF (paroxysmal atrial fibrillation)    Gangrenous cholecystitis     Past Medical History:   Diagnosis Date    CAD (coronary artery disease)     Diabetes mellitus     GERD (gastroesophageal reflux disease)     History of cardiac cath     12 - LVEF 65%. 1+ MV insufficiency. Three vessel CAD. Three of four bypass grafts remain patent. The vein graft to the third marginal branch is a previously documented occlusion. The distal circumflex is unchanged from angiogram 09. 09 - LVEF 65%.Three vessel CAD s/p CABG. One fraft to marginal branch is occluded, the rest of the grafts are patent. The marginal branch is diffusely dis    History of echocardiogram     18 - LA mildly dilated. Normal LV function. Mild MR. Mild-moderate TR. Mild AR. 2012 - EF 60-65%. Moderate MI, TI, and PI. Mild AI.    Hyperlipidemia     Hypertension     Paroxysmal atrial fibrillation      Past Surgical History:   Procedure Laterality Date    CHOLECYSTECTOMY WITH INTRAOPERATIVE CHOLANGIOGRAM N/A 2024    Procedure: CHOLECYSTECTOMY LAPAROSCOPIC INTRAOPERATIVE CHOLANGIOGRAM;  Surgeon: Inocencia Vieira MD;  [facility];  Service: General;  Laterality: N/A;    CORONARY ARTERY BYPASS GRAFT  2005    CABG x4. Internal  mammary artery to LAD, vein graft to diagonal artery, vein graft to OM, vein graft to posterior descending artery.      General Information       Row Name 08/21/24 1510          Physical Therapy Time and Intention    Document Type therapy note (daily note);discharge treatment  -MS     Mode of Treatment physical therapy;individual therapy  -MS       Row Name 08/21/24 1510          General Information    Patient Profile Reviewed yes  -MS     Existing Precautions/Restrictions --   Exit alarm  -MS     Barriers to Rehab none identified  -MS       Row Name 08/21/24 1510          Cognition    Orientation Status (Cognition) oriented to;person;place  -MS       Row Name 08/21/24 1510          Safety Issues, Functional Mobility    Comment, Safety Issues/Impairments (Mobility) Gait belt used for safety.  -MS               User Key  (r) = Recorded By, (t) = Taken By, (c) = Cosigned By      Initials Name Provider Type    MS YeBill, PT Physical Therapist                   Mobility       Row Name 08/21/24 1511          Bed Mobility    Comment, (Bed Mobility) Pt. up in chair this PM.  -MS       Row Name 08/21/24 1511          Sit-Stand Transfer    Sit-Stand [place] (Transfers) standby assist  -MS     Assistive Device (Sit-Stand Transfers) walker, 4-wheeled  -MS       Row Name 08/21/24 1511          Gait/Stairs (Locomotion)    [place] Level (Gait) standby assist  -MS     Assistive Device (Gait) walker, 4-wheeled  -MS     Distance in Feet (Gait) 200  -MS     Bilateral Gait Deviations forward flexed posture  -MS     [place] Level (Stairs) contact guard  -MS     Handrail Location (Stairs) left side (ascending)  -MS     Number of Steps (Stairs) 2  -MS     Ascending Technique (Stairs) step-to-step  -MS     Descending Technique (Stairs) step-to-step  -MS     Comment, (Gait/Stairs) Verbal/tactile cues given for posture correction.  -MS               User Key  (r) = Recorded By, (t) = Taken By, (c) = Cosigned By       Initials Name Provider Type    MS [place]Bill PT Physical Therapist                   Obj/Interventions    No documentation.                  Goals/Plan    No documentation.                  Clinical Impression       Row Name 08/21/24 1512          Pain    Pretreatment Pain Rating 0/10 - no pain  -MS     Posttreatment Pain Rating 0/10 - no pain  -MS       Row Name 08/21/24 1512          Positioning and Restraints    Pre-Treatment Position sitting in chair/recliner  -MS     Post Treatment Position chair  -MS     In Chair notified nsg;sitting;call light within reach;encouraged to call for assist;exit alarm on;with family/caregiver  -MS               User Key  (r) = Recorded By, (t) = Taken By, (c) = Cosigned By      Initials Name Provider Type    MS YeBill PT Physical Therapist                   Outcome Measures       Row Name 08/21/24 1512          How much help from another person do you currently need...    Turning from your back to your side while in flat bed without using bedrails? 4  -MS     Moving from lying on back to sitting on the side of a flat bed without bedrails? 3  -MS     Moving to and from a bed to a chair (including a wheelchair)? 3  -MS     Standing up from a chair using your arms (e.g., wheelchair, bedside chair)? 3  -MS     Climbing 3-5 steps with a railing? 3  -MS     To walk in hospital room? 3  -MS     AM-PAC 6 Clicks Score (PT) 19  -MS     Highest Level of Mobility Goal 6 --> Walk 10 steps or more  -MS       Row Name 08/21/24 1512          Functional Assessment    Outcome Measure Options AM-PAC 6 Clicks Basic Mobility (PT)  -MS               User Key  (r) = Recorded By, (t) = Taken By, (c) = Cosigned By      Initials Name Provider Type    MS YeBill PT Physical Therapist                                 Physical Therapy Education       Title: PT OT SLP Therapies (Resolved)       Topic: Physical Therapy (Resolved)       Point: Mobility training (Resolved)        Learning Progress Summary             Patient Acceptance, E,D, VU by MS at 8/21/2024 1512    Acceptance, E,D, VU,NR by MS at 8/19/2024 1111                         Point: Home exercise program (Resolved)       Learner Progress:  Not documented in this visit.              Point: Body mechanics (Resolved)       Learning Progress Summary             Patient Acceptance, E,D, VU by MS at 8/21/2024 1512    Acceptance, E,D, VU,NR by MS at 8/19/2024 1111                         Point: Precautions (Resolved)       Learning Progress Summary             Patient Acceptance, E,D, VU by MS at 8/21/2024 1512    Acceptance, E,D, VU,NR by MS at 8/19/2024 1111                                         User Key       Initials Effective Dates Name Provider Type Discipline    MS 06/16/21 -  Bill Ye, PT Physical Therapist PT                  PT Recommendation and Plan  Planned Therapy Interventions (PT): balance training, bed mobility training, gait training, home exercise program, patient/family education, postural re-education, transfer training, strengthening  Plan of Care Reviewed With: patient  Outcome Evaluation: Upon entering room, pt. sitting up in chair (reclined), awake/alert, and agreeable to work with P.T. this date.  This PM, pt. able to ambulate 200 feet, SBA x 1, with use of Rollator.  Pt. requires SBA x 1 for sit <-> stand transfers.  Pt. performed 2 stairs (with 1 HR), CGA x 1, and use of gait belt.  Pt.'s family present and educated on how to assist pt. up/down stairs.  Verbal/tactile cues given during ambulation for posture correction.  Pt. plans to d/c home with HHPT this date.  Will sign off.     Time Calculation:         PT Charges       Row Name 08/21/24 1515             Time Calculation    Start Time 1410  -MS      Stop Time 1425  -MS      Time Calculation (min) 15 min  -MS      PT Received On 08/21/24  -MS         Time Calculation- PT    Total Timed Code Minutes- PT 14 minute(s)  -MS                User  Key  (r) = Recorded By, (t) = Taken By, (c) = Cosigned By      Initials Name Provider Type    MS Bill Ye, PT Physical Therapist                  Therapy Charges for Today       Code Description Service Date Service Provider Modifiers Qty    05677369482 HC PT THERAPEUTIC ACT EA 15 MIN 8/21/2024 Bill Ye, PT GP 1            PT G-Codes  Outcome Measure Options: AM-PAC 6 Clicks Basic Mobility (PT)  AM-PAC 6 Clicks Score (PT): 19  PT Discharge Summary  Anticipated Discharge Disposition (PT): home with assist, home with outpatient therapy services  Reason for Discharge: Discharge from facility  Discharge Destination: Home with assist, Home with home health    Bill Ye, PT  8/21/2024

## 2024-08-21 NOTE — PROGRESS NOTES
"General Surgery Progress Note    CC: Acute necrotizing cholecystitis, cholelithiasis    S: Patient denies pain right now, states he had a little soreness at his drain site yesterday but this is better.  Denies any drainage from his incisions.  Has been tolerating regular diet and is eating dark chocolates in bed today.  Denies nausea or vomiting.  Had several bowel movements.  Per nurse and patient's daughter, his mentation is much better today.    O:/52 (BP Location: Left arm, Patient Position: Lying)   Pulse (!) 43   Temp 97.3 °F (36.3 °C) (Oral)   Resp 18   Ht 177 cm (69.69\")   Wt 92.1 kg (203 lb 1.6 oz)   SpO2 99%   BMI 29.41 kg/m²     Intake & Output (last day)         08/20 0701  08/21 0700 08/21 0701 08/22 0700    P.O. 1160     Total Intake(mL/kg) 1160 (12.6)     Urine (mL/kg/hr) 1050 (0.5)     Drains 20     Stool 0     Total Output 1070     Net +90           Urine Unmeasured Occurrence 3 x     Stool Unmeasured Occurrence 2 x             GENERAL: awake, alert, interactive, cooperative  HEENT: EOMI, clear sclera, moist mucus membranes   CHEST: normal work of breathing   CARDIAC: well perfused  GI: Abd protuberant but soft, appropriately tender around incisions which are clean and dry with skin glue in place, no signs of infection, right upper quadrant former drain site with no leak  EXTREMITIES: RIOS, no cyanosis    SKIN: Warm and dry, no rash    LABS  Results from last 7 days   Lab Units 08/21/24  0501 08/20/24  0245 08/19/24  0338   WBC 10*3/mm3 9.36 14.05* 14.40*   HEMOGLOBIN g/dL 12.1* 11.9* 13.9   HEMATOCRIT % 36.1* 35.5* 40.2   PLATELETS 10*3/mm3 232 198 185   MONOCYTES % % 13.0*  --   --    EOSINOPHIL % % 0.0*  --   --      Results from last 7 days   Lab Units 08/21/24  0501 08/20/24  0245 08/19/24  0338   SODIUM mmol/L 136 134* 128*   POTASSIUM mmol/L 4.3 4.3 3.9   CHLORIDE mmol/L 103 101 96*   CO2 mmol/L 22.7 23.0 17.4*   BUN mg/dL 50* 57* 43*   CREATININE mg/dL 1.35* 1.36* 1.30* "   CALCIUM mg/dL 8.7 8.8 9.3   BILIRUBIN mg/dL 0.8 0.6 0.9   ALK PHOS U/L 101 106 98   ALT (SGPT) U/L 96* 67* 62*   AST (SGOT) U/L 95* 89* 83*   GLUCOSE mg/dL 143* 202* 202*         Blood cultures negative at 3 days.    IMAGING:  No new imaging    A/P: 89 y.o. male with cholelithiasis with acute necrotizing cholecystitis. He is postop day 3 from laparoscopic cholecystectomy with intraoperative cholangiogram and right upper quadrant drain placement.  IOC noted postoperatively to have small nonobstructing filling defect possible stone.  LFTs remain relatively stable with normal T. bili, no concerning signs or symptoms of biliary obstruction. Drain dislodged yesterday, nonbilious output noted. Patient completed a 24h postop course of zosyn.    He appears to be improving overall postop.  AVSS. WBC normalized. Hgb stable with no signs of bleeding.  Abdominal exam appropriate postop.  Mentation improved.  Continue regular diet and bowel regimen.   I suspect his LFTs to be elevated in the setting of his severe inflammation due to his gallbladder infection. However due to filling defect on IOC, will ask GI opinion prior to dc.  Continue out of bed with assistance. PT recommending home with assist. Per RN, primary team working on home health prior to discharge.  If no GI intervention needed, patient may follow up with me in the office in 2 weeks for recheck. Discussed wound care, activity restrictions, diet recommendations. Discussed with patient and daughter signs/symptoms concerning for biliary obstruction and other postop complications and counseled them to call the office or return to the ER for severe abdominal pain, intractable nausea/vomiting, jaundice, fever greater than 101F, severe diarrhea/constipation, severe weakness, altered mental status, or other concerns. They voiced understanding and are agreeable with the recommendations.    Addendum: Discussed with Dr. Ponce with GI. Filling defect on IOC is tiny and as  patient does not demonstrate obstructive symptoms/biliary colic, reasonable to have him follow up and will recheck LFTs as indicated. Patient may be discharged from my standpoint with follow up in 2 weeks as above.    Inocencia Vieira MD  General, Robotic and Endoscopic Surgery  [facility] ([facility]    [address], [address]  [place], [place], [address]  P: [phone]  F: [phone]

## 2024-08-21 NOTE — DISCHARGE SUMMARY
Patient Name: Shaheen Saini  : 1935  MRN: [id number]    Date of Admission: 2024  Date of Discharge:  2024  Primary Care Physician: Pal Lawrence MD      Chief Complaint:   Knee Pain      Discharge Diagnoses     Active Hospital Problems    Diagnosis  POA    **Gangrenous cholecystitis [K81.0]  Yes    Type 2 diabetes mellitus, without long-term current use of insulin [E11.9]  Yes    PAF (paroxysmal atrial fibrillation) [I48.0]  Yes    History of coronary artery bypass surgery [Z95.1]  Not Applicable    Hyperlipidemia [E78.5]  Yes    Hypertension [I10]  Yes    CAD (coronary artery disease) [I25.10]  Yes      Resolved Hospital Problems    Diagnosis Date Resolved POA    ARF (acute renal failure) [N17.9] 2024 Yes    Hyponatremia [E87.1] 2024 Yes    Leukocytosis [D72.829] 2024 Yes        Hospital Course     Mr. Saini is a 89 y.o. male with a history of CAD status post CABG, DM2, HTN who presented to [facility] complaining of right knee pain.  He was also having some dyspnea.  See H&P for details.  Workup in ED was significant for leukocytosis along with some TOM and mild hyponatremia.  Knee imaging showed only meniscal calcification with no fracture or joint effusion.  Due to the dyspnea complaint a CT angiogram was performed which actually showed no PE or any significant lung abnormality but did reveal multiple gallstones with inflammation consistent with acute cholecystitis.  LFTs were initially mildly elevated and were more or less stable through the hospitalization.  He was seen in consultation by general surgery who recommended cholecystectomy.  Cardiology was asked to see him due to the dyspnea and abnormal BNP and troponins.  Echocardiogram was performed as detailed below but there was no obvious cardiac reason to delay surgery so he was taken to the operating room the following day.  From a surgical standpoint he tolerated the procedure well.  He did  have significant confusion postoperatively and eventually psychiatry was even consulted to assist with management.  This was felt to be due to metabolic encephalopathy or postoperative delirium.  They started him on a very low-dose haloperidol.  He has cleared significantly over the last 24 hours.  He is still a bit confused but more or less appropriate on my evaluation today.  He is not sleeping well in the hospital and I think will do better once he gets home.  From a surgical standpoint he has been cleared for discharge.  There was a questionable tiny filling defect on the intraoperative cholangiogram but Dr. Vieira has discussed his case with GI and they recommend outpatient follow-up with recheck of the LFTs and no urgent intervention.  Patient has been up and ambulatory with PT and appears to be stable for discharge home with home health.  I discussed all of this with his caregiver who is his daughter at bedside who ultimately is agreeable with discharge though concerned about his care at home.  She does not want him to go to rehab however so there is really no other option then to go home with home health.  He is walking over 100 feet so probably would not meet criteria to be admitted to skilled nursing facility.      Day of Discharge     Subjective:  No events.  Very much wants to go    Physical Exam:  Temp:  [97.3 °F (36.3 °C)-97.5 °F (36.4 °C)] 97.3 °F (36.3 °C)  Heart Rate:  [42-50] 43  Resp:  [18] 18  BP: (120-137)/(47-52) 137/52  Body mass index is 29.41 kg/m².  Physical Exam  Vitals reviewed.   Constitutional:       General: He is not in acute distress.     Appearance: He is not ill-appearing.   Cardiovascular:      Rate and Rhythm: Normal rate and regular rhythm.   Pulmonary:      Effort: Pulmonary effort is normal. No respiratory distress.   Abdominal:      Palpations: Abdomen is soft.      Tenderness: There is no abdominal tenderness.   Musculoskeletal:      Right lower leg: No edema.      Left lower  leg: No edema.   Skin:     General: Skin is warm and dry.   Neurological:      General: No focal deficit present.      Mental Status: He is alert.      Comments: Calm and appropriate though perhaps slightly confused   Psychiatric:         Mood and Affect: Mood normal.         Consultants     Consult Orders (all) (From admission, onward)       Start     Ordered    08/21/24 1153  Inpatient Gastroenterology Consult  Once        Specialty:  Gastroenterology  Provider:  Gera Guerrero MD    08/21/24 1153    08/20/24 1117  Inpatient Psychiatrist Consult  Once        Specialty:  Psychiatry  Provider:  Dewayne Baker III, MD    08/20/24 1117    08/17/24 1154  Inpatient Case Management  Consult  Once        Provider:  (Not yet assigned)    08/17/24 1157    08/17/24 1015  [facility] (on-call MD unless specified)  Once        Specialty:  Cardiology  Provider:  Dada Rojo Jr., MD    08/17/24 1014    08/17/24 0949  Surgery (on-call MD unless specified)  Once        Specialty:  General Surgery  Provider:  Inocencia Vieira MD    08/17/24 0948    08/17/24 0949  [facility] (on-call MD unless specified) Details  Once        Specialty:  Hospitalist  Provider:  Ryan Moreland MD    08/17/24 0948    Pending  Inpatient Gastroenterology Consult  Once        Specialty:  Gastroenterology  Provider:  (Not yet assigned)    Pending                  Procedures     CHOLECYSTECTOMY LAPAROSCOPIC INTRAOPERATIVE CHOLANGIOGRAM    Imaging Results (All)       Procedure Component Value Units Date/Time    FL Cholangiogram Operative [[id number]] Collected: 08/18/24 1213     Updated: 08/18/24 1218    Narrative:      INTRAOPERATIVE PORTABLE VIEW CHOLANGIOGRAM     CLINICAL INFORMATION: Post cholecystectomy     FINDINGS: Upon injection of the cystic duct with contrast, the proximal  intrahepatic, common hepatic and common bile ducts are opacified.  Contrast passes into the duodenum. A small filling defect with a  somewhat  rounded contour within the distal common bile duct is present  throughout the examination and choledocholithiasis cannot be excluded.  Further evaluation with MR and MRCP of the abdomen is recommended.     Dose: Ka,r 29mGy   .        This report was finalized on 8/18/2024 12:15 PM by Dr. Luis Lan M.D on Workstation: [id number]       CT Angiogram Chest [[id number]] Collected: 08/17/24 0929     Updated: 08/17/24 0937    Narrative:      CT ANGIOGRAPHY OF THE CHEST WITH INTRAVENOUS CONTRAST AND 3D  RECONSTRUCTIONS     HISTORY: Shortness of breath. Upper abdominal pain.     The CT scan was performed as an emergency procedure with intravenous  contrast using CT angiography protocol and 3D reconstructions. The  following findings are present:     1. The pulmonary arteries are well-opacified and there is no evidence of  pulmonary embolus. The thoracic aorta shows no evidence of aneurysm or  dissection.     2. There is a very small right pleural effusion with adjacent  compressive atelectasis and pleural reaction. There is also some minimal  atelectasis at the left base. The lungs are otherwise clear.     3. There is no mediastinal or hilar or axillary adenopathy. There is  prominent coronary artery calcification. There is no pericardial  effusion.     4. The CT images through the upper abdomen demonstrate multiple  gallstones within the gallbladder with surrounding haziness and  inflammatory change highly suspicious for acute cholecystitis. Further  evaluation with ultrasound may be helpful. The visualized liver and  spleen and both adrenal glands are unremarkable. There is a large right  peripelvic renal cyst measuring 5.5 cm.              Radiation dose reduction techniques were utilized, including automated  exposure control and exposure modulation based on body size.        This report was finalized on 8/17/2024 9:34 AM by Dr. Trevor Jenkins M.D  on Workstation: [id number]       XR Chest 1 View [[id number]]  Collected: 08/17/24 0922     Updated: 08/17/24 0928    Narrative:      ONE-VIEW PORTABLE CHEST     HISTORY: Multiple falls. Chest pain.     FINDINGS: The lungs are moderately expanded and clear. The heart is top  normal in size with sternal wires from previous cardiac surgery. Other  than decreased lung volumes, there is no change from previous chest  x-ray dated 1/13/2020.     2 VIEW RIGHT KNEE     HISTORY: Fell. Knee pain.     FINDINGS: There is very prominent meniscal calcification without  significant joint space narrowing and this may relate to pseudogout and  clinical correlation is recommended. There is no evidence of fracture or  joint effusion.        This report was finalized on 8/17/2024 9:25 AM by Dr. Trevor Jenkins M.D  on Workstation: [id number]       XR Knee 1 or 2 View Right [[id number]] Collected: 08/17/24 0922     Updated: 08/17/24 0928    Narrative:      ONE-VIEW PORTABLE CHEST     HISTORY: Multiple falls. Chest pain.     FINDINGS: The lungs are moderately expanded and clear. The heart is top  normal in size with sternal wires from previous cardiac surgery. Other  than decreased lung volumes, there is no change from previous chest  x-ray dated 1/13/2020.     2 VIEW RIGHT KNEE     HISTORY: Fell. Knee pain.     FINDINGS: There is very prominent meniscal calcification without  significant joint space narrowing and this may relate to pseudogout and  clinical correlation is recommended. There is no evidence of fracture or  joint effusion.        This report was finalized on 8/17/2024 9:25 AM by Dr. Trevor Jenkins M.D  on Workstation: [id number]               Results for orders placed during the hospital encounter of 08/17/24    Adult Transthoracic Echo Complete W/ Cont if Necessary Per Protocol    Interpretation Summary    Left ventricular systolic function is normal. Left ventricular ejection fraction appears to be 61 - 65%.    Left ventricular diastolic function was normal.    Mild aortic valve  "stenosis is present.  Mild aortic regurgitation.    Peak velocity of the flow distal to the aortic valve is 241 cm/s. Aortic valve maximum pressure gradient is 23 mmHg. Aortic valve mean pressure gradient is 13 mmHg. Aortic valve dimensionless index is 0.5 .    Estimated right ventricular systolic pressure from tricuspid regurgitation is mildly elevated (35-45 mmHg). Calculated right ventricular systolic pressure from tricuspid regurgitation is 41 mmHg.    Pertinent Labs     Results from last 7 days   Lab Units 08/21/24  0501 08/20/24  0245 08/19/24 0338 08/18/24  0255   WBC 10*3/mm3 9.36 14.05* 14.40* 14.20*   HEMOGLOBIN g/dL 12.1* 11.9* 13.9 14.9   PLATELETS 10*3/mm3 232 198 185 165     Results from last 7 days   Lab Units 08/21/24  0501 08/20/24  0245 08/19/24 0338 08/18/24  0255   SODIUM mmol/L 136 134* 128* 129*   POTASSIUM mmol/L 4.3 4.3 3.9 4.0   CHLORIDE mmol/L 103 101 96* 97*   CO2 mmol/L 22.7 23.0 17.4* 18.0*   BUN mg/dL 50* 57* 43* 32*   CREATININE mg/dL 1.35* 1.36* 1.30* 1.26   GLUCOSE mg/dL 143* 202* 202* 75   EGFR mL/min/1.73 50.5* 50.1* 52.8* 54.9*     Results from last 7 days   Lab Units 08/21/24  0501 08/20/24  0245 08/19/24 0338 08/18/24  0255   ALBUMIN g/dL 2.9* 3.0* 3.2* 3.1*   BILIRUBIN mg/dL 0.8 0.6 0.9 0.9   ALK PHOS U/L 101 106 98 92   AST (SGOT) U/L 95* 89* 83* 73*   ALT (SGPT) U/L 96* 67* 62* 47*     Results from last 7 days   Lab Units 08/21/24  0501 08/20/24  0245 08/19/24 0338 08/18/24  0255   CALCIUM mg/dL 8.7 8.8 9.3 8.8   ALBUMIN g/dL 2.9* 3.0* 3.2* 3.1*   MAGNESIUM mg/dL 2.5* 2.7* 2.9* 2.5*   PHOSPHORUS mg/dL 3.1 2.6 3.6 2.8       Results from last 7 days   Lab Units 08/17/24  1144 08/17/24  0821   HSTROP T ng/L 37* 29*   PROBNP pg/mL  --  2,851.0*           Invalid input(s): \"LDLCALC\"  Results from last 7 days   Lab Units 08/17/24  0953 08/17/24  0952   BLOODCX  No growth at 4 days No growth at 4 days     Results from last 7 days   Lab Units 08/17/24  0846   COVID19  Not " Detected       Test Results Pending at Discharge     Pending Labs       Order Current Status    Blood Culture - Blood, Arm, Left Preliminary result    Blood Culture - Blood, Arm, Right Preliminary result            Discharge Details        Discharge Medications        Continue These Medications        Instructions Start Date   Accu-Chek Guide w/Device kit   1 Device, Does not apply, Daily      Accu-Chek Softclix Lancets lancets   1 each, Other, Daily      aspirin 81 MG EC tablet   81 mg, Oral, Daily      atenolol 25 MG tablet  Commonly known as: TENORMIN   12.5 mg, Oral, Daily      atorvastatin 20 MG tablet  Commonly known as: LIPITOR   20 mg, Oral, Daily      fexofenadine-pseudoephedrine  MG per 12 hr tablet  Commonly known as: ALLEGRA-D   1 tablet, Oral, 2 Times Daily      glipizide 5 MG ER tablet  Commonly known as: GLUCOTROL XL   5 mg, Oral, Daily      glucose blood test strip   1 strip, Other, Daily      Jardiance 10 MG tablet tablet  Generic drug: empagliflozin   25 mg, Oral, Daily      multivitamin with minerals tablet tablet   1 tablet, Oral, Daily      nitroglycerin 0.4 MG SL tablet  Commonly known as: NITROSTAT   0.4 mg, Sublingual, Every 5 Minutes PRN, Take no more than 3 doses in 15 minutes.       oxazepam 15 MG capsule  Commonly known as: SERAX   15 mg, Oral, Daily      PARoxetine 10 MG tablet  Commonly known as: PAXIL   10 mg, Oral, Every Morning      PETROLATUM 42 % EX OINT WRAPPER   1 application , Topical, Every 12 Hours Scheduled      ranolazine 500 MG 12 hr tablet  Commonly known as: RANEXA   500 mg, Oral, 2 Times Daily      vitamin C 500 MG tablet  Commonly known as: ASCORBIC ACID   500 mg, Oral      Zinc 50 MG capsule   1 capsule, Oral, Daily             Stop These Medications      lisinopril 5 MG tablet  Commonly known as: PRINIVIL,ZESTRIL              No Known Allergies    Discharge Disposition:  Home-Health Care [facility]      Discharge Diet:  Diet Order   Procedures    Diet: Regular/House;  Fluid Consistency: Thin (IDDSI 0)       Discharge Activity:       CODE STATUS:    Code Status and Medical Interventions: CPR (Attempt to Resuscitate); Full   Ordered at: 08/17/24 1004     Level Of Support Discussed With:    Patient     Code Status (Patient has no pulse and is not breathing):    CPR (Attempt to Resuscitate)     Medical Interventions (Patient has pulse or is breathing):    Full       No future appointments.   Contact information for follow-up providers       Inocencia Vieira MD Follow up in 2 week(s).    Specialty: General Surgery  Why: Please call to confirm your follow up appointment.  Contact information:  [address] [facility] [address]  [place] [address]  [phone]               Pal Lawrence MD .    Specialty: Family Medicine  Contact information:  [address]  [place] [address]  [phone]                       Contact information for after-discharge care       Home Medical Care       [facility] .    Service: Home Health Services  Contact information:  [address] [facility] [address]  [place] [address]  [phone]                                   Time Spent on Discharge:  Greater than 30 minutes      Bill Garces MD  [facility]  08/21/24  13:00 EDT

## 2024-08-21 NOTE — PLAN OF CARE
Goal Outcome Evaluation:  Plan of Care Reviewed With: patient           Outcome Evaluation: Upon entering room, pt. sitting up in chair (reclined), awake/alert, and agreeable to work with P.T. this date.  This PM, pt. able to ambulate 200 feet, SBA x 1, with use of Rollator.  Pt. requires SBA x 1 for sit <-> stand transfers.  Pt. performed 2 stairs (with 1 HR), CGA x 1, and use of gait belt.  Pt.'s family present and educated on how to assist pt. up/down stairs.  Verbal/tactile cues given during ambulation for posture correction.  Pt. plans to d/c home with HHPT this date.  Will sign off.      Anticipated Discharge Disposition (PT): home with assist, home with outpatient therapy services

## 2024-08-21 NOTE — CASE MANAGEMENT/SOCIAL WORK
Continued Stay Note  [facility]     Patient Name: Shaheen Saini  MRN: [id number]  Today's Date: 8/21/2024    Admit Date: 8/17/2024    Plan: Home with [facility]. Family will transport home.   Discharge Plan       Row Name 08/21/24 1224       Plan    Plan Home with [facility]. Family will transport home.    Plan Comments Pt to be dc home with [facility], family will transport home.                   Discharge Codes    No documentation.                 Expected Discharge Date and Time       Expected Discharge Date Expected Discharge Time    Aug 21, 2024               Kathy Saldaña RN

## 2024-08-21 NOTE — DISCHARGE PLACEMENT REQUEST
"Shaheen Wu (89 y.o. Male)       Date of Birth   1935    Social Security Number       Address   [facility] [place] [id number]    Home Phone   [phone]    MRN   [id number]       Rastafari   Unknown    Marital Status                               Admission Date   8/17/24    Admission Type   Emergency    Admitting Provider   Ryan Moreland MD    Attending Provider   Bill Garces MD    Department, Room/Bed   [address], [address]       Discharge Date       Discharge Disposition       Discharge Destination                                 Attending Provider: Bill Garces MD    Allergies: No Known Allergies    Isolation: None   Infection: None   Code Status: CPR    Ht: 177 cm (69.69\")   Wt: 92.1 kg (203 lb 1.6 oz)    Admission Cmt: None   Principal Problem: Gangrenous cholecystitis [K81.0]                   Active Insurance as of 8/17/2024       Primary Coverage       Payor Plan Insurance Group Employer/Plan Group    MEDICARE MEDICARE A & B        Payor Plan Address Payor Plan Phone Number Payor Plan Fax Number Effective Dates    [address] [phone]  7/1/2001 - None Entered    [facility] [id number]         Subscriber Name Subscriber Birth Date Member ID       SHAHEEN WU 1935 [id number]               Secondary Coverage       Payor Plan Insurance Group Employer/Plan Group    [facility] SUPP [id number]       Payor Plan Address Payor Plan Phone Number Payor Plan Fax Number Effective Dates    [address]   12/1/2016 - None Entered    [facility] [id number]         Subscriber Name Subscriber Birth Date Member ID       SHAHEEN WU 1935 [id number]                     Emergency Contacts        (Rel.) Home Phone Work Phone Mobile Phone    JAZMINDIRK REEVES (Daughter) [phone] -- [phone]    JazminFlor (Spouse) [phone] -- [phone]    JAZMIN DANNY CONTRERAS (Son) -- [phone] " [phone]

## 2024-08-22 LAB
BACTERIA SPEC AEROBE CULT: NORMAL
BACTERIA SPEC AEROBE CULT: NORMAL

## 2024-08-22 NOTE — PROGRESS NOTES
"Enter Query Response Below      Query Response:   Patient had postop delirium           If applicable, please update the problem list.       Patient: Shaheen Saini        : 1935  Account: [id number]           Admit Date:         How to Respond to this query:       a. Click New Note     b. Answer query within the yellow box.                c. Update the Problem List, if applicable.      If you have any questions about this query contact me at: [email or web address]     Dr. Garces:      Nursing note states alert, oriented, went down for lap choley today.  Surgery progress note states, \"per his daughter he has had some hallucinations, as well as confusion\".  Hospitalist progress note states \"appears more confused today, confusion and hallucinations, consult psychiatry.\"  Psychiatry consult note states metabolic encephalopathy, most likely postoperative in etiology, mentation should return to baseline in next 2 to 4 days appears as though he is having poster operative delirium.     After study, was metabolic encephalopathy clinically supported during this admission?    Metabolic encephalopathy was supported with additional clinical indicators:____________  Metabolic encephalopathy was not supported, postoperative delirium only.   Other- specify_____________  Unable to determine.    By submitting this query, we are merely seeking further clarification of documentation to accurately reflect all conditions that you are monitoring, evaluating, treating or that extend the hospitalization or utilize additional resources of care. Please utilize your independent clinical judgment when addressing the question(s) above.     This query and your response, once completed, will be entered into the legal medical record.    Sincerely,  Kei SURESH RN BSN   Clinical Documentation Integrity Program   [email or web address]  "

## 2024-08-22 NOTE — OUTREACH NOTE
Prep Survey      Flowsheet Row Responses   [facility] patient discharged from? [place]   Is LACE score < 7 ? No   Eligibility Readm Mgmt   Discharge diagnosis Gangrenous cholecystitis, Lap dinah   Does the patient have one of the following disease processes/diagnoses(primary or secondary)? General Surgery   Does the patient have Home health ordered? Yes   [facility] is [facility]?  [facility]    Is there a DME ordered? No   Prep survey completed? Yes            Macie SURESH - Registered Nurse

## 2024-08-26 ENCOUNTER — READMISSION MANAGEMENT (OUTPATIENT)
Dept: CALL CENTER | Facility: HOSPITAL | Age: 89
End: 2024-08-26
Payer: MEDICARE

## 2024-08-26 NOTE — OUTREACH NOTE
General Surgery Week 1 Survey      Flowsheet Row Responses   [facility] patient discharged from? [place]   Does the patient have one of the following disease processes/diagnoses(primary or secondary)? General Surgery   Week 1 attempt successful? Yes   Call start time 0902   Call end time 0913   Discharge diagnosis Gangrenous cholecystitis, Lap dinah   Meds reviewed with patient/caregiver? Yes   Is the patient having any side effects they believe may be caused by any medication additions or changes? No   Does the patient have all medications related to this admission filled (includes all antibiotics, pain medications, etc.) Yes   Is the patient taking all medications as directed (includes completed medication regime)? Yes   Does the patient have a follow up appointment scheduled with their surgeon? Yes   Has the patient kept scheduled appointments due by today? N/A   Comments Has PCP appt on Wed and surgeon on 9/9   What is the Home health agency?  [facility]   Has home health visited the patient within 72 hours of discharge? Yes   Home health comments States they are not happy with home health agency.  States they don't show up when they say they are coming.  They may change agencies.  Advised to speak with PCP if they are not happy with their home health and they can send orders to another agency.   Psychosocial issues? No   Did the patient receive a copy of their discharge instructions? Yes   Nursing interventions Reviewed instructions with patient   What is the patient's perception of their health status since discharge? Improving   Nursing interventions Nurse provided patient education   Is the patient /caregiver able to teach back basic post-op care? Practice 'cough and deep breath', Keep incision areas clean,dry and protected, Lifting as instructed by MD in discharge instructions   Is the patient/caregiver able to teach back signs and symptoms of incisional infection? Increased redness, swelling or  pain at the incisonal site, Increased drainage or bleeding, Incisional warmth, Pus or odor from incision, Fever   Is the patient/caregiver able to teach back steps to recovery at home? Set small, achievable goals for return to baseline health, Rest and rebuild strength, gradually increase activity, Make a list of questions for surgeon's appointment, Weigh daily, Practice good oral hygiene   If the patient is a current smoker, are they able to teach back resources for cessation? Not a smoker   Is the patient/caregiver able to teach back the hierarchy of who to call/visit for symptoms/problems? PCP, Specialist, Home health nurse, Urgent Care, ED, 911 Yes   Additional teach back comments States he is sore but doing pretty good.  His blood sugars have been better since being home with a low sugar diet.  Daughter checks bp twice a day.  He uses a walker to ambulate.   Week 1 call completed? Yes   Wrap up additional comments They would like to continue to have follow up calls.   Call end time 0913            Yajaira WARE - Licensed Nurse

## 2024-09-03 ENCOUNTER — READMISSION MANAGEMENT (OUTPATIENT)
Dept: CALL CENTER | Facility: HOSPITAL | Age: 89
End: 2024-09-03
Payer: MEDICARE

## 2024-09-03 NOTE — OUTREACH NOTE
General Surgery Week 2 Survey      Flowsheet Row Responses   [facility] patient discharged from? [place]   Does the patient have one of the following disease processes/diagnoses(primary or secondary)? General Surgery   Week 2 attempt successful? Yes   Call start time 1611   Call end time 1612   Discharge diagnosis Gangrenous cholecystitis, Lap dinah   Meds reviewed with patient/caregiver? Yes   Is the patient taking all medications as directed (includes completed medication regime)? Yes   Does the patient have a follow up appointment scheduled with their surgeon? Yes   Has the patient kept scheduled appointments due by today? N/A   Comments Gen sx apt on 9/9/24   What [facility] health agency?  [facility]    Home health comments  still visits   What is the patient's perception of their health status since discharge? Improving   Is the patient/caregiver able to teach back the hierarchy of who to call/visit for symptoms/problems? PCP, Specialist, Home health nurse, Urgent Care, ED, 911 Yes   Week 2 call completed? Yes   Graduated Yes   Graduated/Revoked comments Pt improving   Call end time 1612            Shweta H - Registered Nurse

## 2024-09-09 ENCOUNTER — OFFICE VISIT (OUTPATIENT)
Dept: SURGERY | Facility: CLINIC | Age: 89
End: 2024-09-09
Payer: MEDICARE

## 2024-09-09 VITALS
WEIGHT: 198 LBS | SYSTOLIC BLOOD PRESSURE: 120 MMHG | HEART RATE: 74 BPM | OXYGEN SATURATION: 97 % | TEMPERATURE: 98 F | HEIGHT: 69 IN | DIASTOLIC BLOOD PRESSURE: 65 MMHG | BODY MASS INDEX: 29.33 KG/M2

## 2024-09-09 DIAGNOSIS — Z90.49 STATUS POST LAPAROSCOPIC CHOLECYSTECTOMY: Primary | ICD-10-CM

## 2024-09-09 PROCEDURE — 1159F MED LIST DOCD IN RCRD: CPT | Performed by: STUDENT IN AN ORGANIZED HEALTH CARE EDUCATION/TRAINING PROGRAM

## 2024-09-09 PROCEDURE — 99024 POSTOP FOLLOW-UP VISIT: CPT | Performed by: STUDENT IN AN ORGANIZED HEALTH CARE EDUCATION/TRAINING PROGRAM

## 2024-09-09 PROCEDURE — 1160F RVW MEDS BY RX/DR IN RCRD: CPT | Performed by: STUDENT IN AN ORGANIZED HEALTH CARE EDUCATION/TRAINING PROGRAM

## 2024-09-09 NOTE — PROGRESS NOTES
General Surgery Office Follow Up Note     History of Present Illness:    Shaheen Saini is a 89 y.o. male who presents for follow up from recent hospitalization 8/17/2024 to 8/21/2024 during which he underwent laparoscopic cholecystectomy with intraoperative cholangiogram and right upper quadrant drain placement for acute necrotizing cholecystitis with cholelithiasis.  Drain was removed by patient postop day 2, and patient did not demonstrate evidence of biliary obstruction or bile leak prior to this.  IOC was notable for small filling defect concerning for possible choledocholithiasis.  Case was discussed with GI and as patient was improving clinically he was recommended to continue observation, and follow-up and recheck LFTs as indicated.    Interval history:  The patient is accompanied by his wife and daughter today in the office.  He states that he is feeling well after surgery.  He still has some soreness at his incisions but denies any other problems.  He denies nausea, vomiting, fever, diarrhea, constipation, chest pain, or shortness of breath.  His daughter notes he has had occasional chills but the patient denies this.  He has not had any jaundice or problems with his incisions.  He did have some drainage at his former drain site but this is stopped and the site has scabbed over.  He has not had any diarrhea and is having every other day formed, consistent bowel movements.  He underwent lab work on 8/28/2024 and LFTs were noted to be normal.    Allergies:  No Known Allergies    Meds:    Current Outpatient Medications:     ACCU-CHEK SOFTCLIX LANCETS lancets, 1 each by Other route Daily., Disp: , Rfl:     aspirin 81 MG EC tablet, Take 1 tablet by mouth Daily., Disp: , Rfl:     atenolol (TENORMIN) 25 MG tablet, Take 0.5 tablets by mouth Daily., Disp: , Rfl:     atorvastatin (LIPITOR) 20 MG tablet, Take 1 tablet by mouth Daily., Disp: , Rfl:     Blood Glucose Monitoring Suppl (ACCU-CHEK GUIDE) w/Device kit, 1  "Device Daily., Disp: , Rfl:     Emollient (PETROLATUM 42 % EX OINT WRAPPER), Apply 1 application topically to the appropriate area as directed Every 12 (Twelve) Hours., Disp: , Rfl:     fexofenadine-pseudoephedrine (ALLEGRA-D)  MG per 12 hr tablet, Take 1 tablet by mouth 2 (Two) Times a Day., Disp: , Rfl:     glipizide (GLUCOTROL XL) 5 MG ER tablet, Take 1 tablet by mouth Daily., Disp: , Rfl:     glucose blood test strip, 1 each by Other route Daily., Disp: , Rfl:     multivitamin with minerals (MULTIVITAMIN ADULT PO), Take 1 tablet by mouth Daily., Disp: , Rfl:     nitroglycerin (NITROSTAT) 0.4 MG SL tablet, Place 1 tablet under the tongue Every 5 (Five) Minutes As Needed for Chest Pain. Take no more than 3 doses in 15 minutes., Disp: , Rfl:     oxazepam (SERAX) 15 MG capsule, Take 1 capsule by mouth Daily., Disp: , Rfl:     PARoxetine (PAXIL) 10 MG tablet, Take 1 tablet by mouth Every Morning., Disp: , Rfl:     ranolazine (RANEXA) 500 MG 12 hr tablet, Take 1 tablet by mouth 2 (Two) Times a Day., Disp: 180 tablet, Rfl: 3    vitamin C (ASCORBIC ACID) 500 MG tablet, Take 1 tablet by mouth., Disp: , Rfl:     Zinc 50 MG capsule, Take 1 capsule by mouth Daily., Disp: , Rfl:     Physical Exam:   /65   Pulse 74   Temp 98 °F (36.7 °C)   Ht 175.3 cm (69\")   Wt 89.8 kg (198 lb)   SpO2 97%   BMI 29.24 kg/m²   Constitutional: Well-developed well-nourished   Eyes: Conjunctiva normal, sclera nonicteric  HENT: Hearing grossly normal, oral mucosa moist  Respiratory: Normal inspiratory effort on room air  Cardiovascular: Well-perfused, no JVD  Gastrointestinal: Abd soft, nontender, nondistended, no rebound or guarding, healing surgical incisions without signs of infection, no drainage from right upper quadrant former drain site   Musculoskeletal: Symmetric strength, uses rolling walker to ambulate  Psychiatric: Normal mood and affect  Skin:  Warm, dry, no rash on visualized skin surfaces  BMI is >= 25 and <30. " (Overweight) The following options were offered after discussion;: information on healthy weight added to patient's after visit summary     Labs:   Hepatic function panel 8/28/2024 reviewed, total bilirubin 0.7, AST 32, ALT 39, alkaline phosphatase 101      Assessment/Plan:  89-year-old gentleman presenting status post laparoscopic cholecystectomy for acute necrotizing cholecystitis with cholelithiasis    Patient doing well, with expected postop course.  Tolerating regular diet, having bowel function.  LFTs have normalized.  Incisions healing in good order.  No signs of biliary obstruction on exam requiring further investigation of choledocholithiasis.  Discussed pathology results.  Return to activity as tolerated. Avoid activities that cause pain or strain at incision sites.    Patient may follow-up with general surgery as needed.  Patient voiced understanding and agreement with recommendations.    Inocencia Vieira M.D.  General, Robotic and Endoscopic Surgery  [facility]    [address], [address]  [place], [place], [address]  P: [phone]  F: [phone]

## 2024-10-07 ENCOUNTER — HOSPITAL ENCOUNTER (INPATIENT)
Facility: HOSPITAL | Age: 89
LOS: 2 days | Discharge: HOME HEALTH | DRG: 155 | End: 2024-10-10
Attending: EMERGENCY MEDICINE | Admitting: STUDENT IN AN ORGANIZED HEALTH CARE EDUCATION/TRAINING PROGRAM
Payer: MEDICARE

## 2024-10-07 DIAGNOSIS — R20.0 NUMBNESS AND TINGLING OF BOTH UPPER EXTREMITIES: ICD-10-CM

## 2024-10-07 DIAGNOSIS — T14.8XXA MULTIPLE SKIN TEARS: ICD-10-CM

## 2024-10-07 DIAGNOSIS — R20.2 NUMBNESS AND TINGLING OF BOTH UPPER EXTREMITIES: ICD-10-CM

## 2024-10-07 DIAGNOSIS — M48.02 CERVICAL STENOSIS OF SPINAL CANAL: ICD-10-CM

## 2024-10-07 DIAGNOSIS — S16.1XXA STRAIN OF NECK MUSCLE, INITIAL ENCOUNTER: ICD-10-CM

## 2024-10-07 DIAGNOSIS — S02.2XXA CLOSED FRACTURE OF NASAL BONE, INITIAL ENCOUNTER: ICD-10-CM

## 2024-10-07 DIAGNOSIS — R26.2 UNABLE TO AMBULATE: ICD-10-CM
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
## 2024-10-07 DIAGNOSIS — S09.90XA CLOSED HEAD INJURY, INITIAL ENCOUNTER: ICD-10-CM

## 2024-10-07 PROCEDURE — 80053 COMPREHEN METABOLIC PANEL: CPT | Performed by: EMERGENCY MEDICINE

## 2024-10-07 PROCEDURE — 99285 EMERGENCY DEPT VISIT HI MDM: CPT

## 2024-10-07 PROCEDURE — 85025 COMPLETE CBC W/AUTO DIFF WBC: CPT | Performed by: EMERGENCY MEDICINE

## 2024-10-08 ENCOUNTER — APPOINTMENT (OUTPATIENT)
Dept: CT IMAGING | Facility: HOSPITAL | Age: 89
DRG: 155 | End: 2024-10-08
Payer: MEDICARE
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
## 2024-10-08 PROBLEM — S09.90XA CLOSED HEAD INJURY: Status: ACTIVE | Noted: 2024-10-08

## 2024-10-08 LAB
ALBUMIN SERPL-MCNC: 3.6 G/DL (ref 3.5–5.2)
ALBUMIN SERPL-MCNC: 3.6 G/DL (ref 3.5–5.2)
ALBUMIN/GLOB SERPL: 1.3 G/DL
ALBUMIN/GLOB SERPL: 1.3 G/DL
ALP SERPL-CCNC: 76 U/L (ref 39–117)
ALP SERPL-CCNC: 80 U/L (ref 39–117)
ALT SERPL W P-5'-P-CCNC: 20 U/L (ref 1–41)
ALT SERPL W P-5'-P-CCNC: 21 U/L (ref 1–41)
ANION GAP SERPL CALCULATED.3IONS-SCNC: 10.7 MMOL/L (ref 5–15)
ANION GAP SERPL CALCULATED.3IONS-SCNC: 8 MMOL/L (ref 5–15)
AST SERPL-CCNC: 17 U/L (ref 1–40)
AST SERPL-CCNC: 23 U/L (ref 1–40)
BASOPHILS # BLD AUTO: 0.05 10*3/MM3 (ref 0–0.2)
BASOPHILS NFR BLD AUTO: 0.4 % (ref 0–1.5)
BILIRUB SERPL-MCNC: 0.4 MG/DL (ref 0–1.2)
BILIRUB SERPL-MCNC: 0.4 MG/DL (ref 0–1.2)
BUN SERPL-MCNC: 13 MG/DL (ref 8–23)
BUN SERPL-MCNC: 15 MG/DL (ref 8–23)
BUN/CREAT SERPL: 18.5 (ref 7–25)
BUN/CREAT SERPL: 20 (ref 7–25)
CALCIUM SPEC-SCNC: 8.9 MG/DL (ref 8.6–10.5)
CALCIUM SPEC-SCNC: 9.1 MG/DL (ref 8.6–10.5)
CHLORIDE SERPL-SCNC: 97 MMOL/L (ref 98–107)
CHLORIDE SERPL-SCNC: 98 MMOL/L (ref 98–107)
CO2 SERPL-SCNC: 22.3 MMOL/L (ref 22–29)
CO2 SERPL-SCNC: 24 MMOL/L (ref 22–29)
CREAT SERPL-MCNC: 0.65 MG/DL (ref 0.76–1.27)
CREAT SERPL-MCNC: 0.81 MG/DL (ref 0.76–1.27)
DEPRECATED RDW RBC AUTO: 47.3 FL (ref 37–54)
DEPRECATED RDW RBC AUTO: 47.9 FL (ref 37–54)
EGFRCR SERPLBLD CKD-EPI 2021: 84.8 ML/MIN/1.73
EGFRCR SERPLBLD CKD-EPI 2021: 90.6 ML/MIN/1.73
EOSINOPHIL # BLD AUTO: 0.37 10*3/MM3 (ref 0–0.4)
EOSINOPHIL NFR BLD AUTO: 3.3 % (ref 0.3–6.2)
ERYTHROCYTE [DISTWIDTH] IN BLOOD BY AUTOMATED COUNT: 13.4 % (ref 12.3–15.4)
ERYTHROCYTE [DISTWIDTH] IN BLOOD BY AUTOMATED COUNT: 13.5 % (ref 12.3–15.4)
GLOBULIN UR ELPH-MCNC: 2.7 GM/DL
GLOBULIN UR ELPH-MCNC: 2.8 GM/DL
GLUCOSE BLDC GLUCOMTR-MCNC: 101 MG/DL (ref 70–130)
GLUCOSE BLDC GLUCOMTR-MCNC: 136 MG/DL (ref 70–130)
GLUCOSE BLDC GLUCOMTR-MCNC: 163 MG/DL (ref 70–130)
GLUCOSE BLDC GLUCOMTR-MCNC: 210 MG/DL (ref 70–130)
GLUCOSE SERPL-MCNC: 180 MG/DL (ref 65–99)
GLUCOSE SERPL-MCNC: 210 MG/DL (ref 65–99)
HCT VFR BLD AUTO: 37.4 % (ref 37.5–51)
HCT VFR BLD AUTO: 38.9 % (ref 37.5–51)
HGB BLD-MCNC: 12.4 G/DL (ref 13–17.7)
HGB BLD-MCNC: 13.1 G/DL (ref 13–17.7)
IMM GRANULOCYTES # BLD AUTO: 0.08 10*3/MM3 (ref 0–0.05)
IMM GRANULOCYTES NFR BLD AUTO: 0.7 % (ref 0–0.5)
LYMPHOCYTES # BLD AUTO: 1.4 10*3/MM3 (ref 0.7–3.1)
LYMPHOCYTES NFR BLD AUTO: 12.5 % (ref 19.6–45.3)
MCH RBC QN AUTO: 31.9 PG (ref 26.6–33)
MCH RBC QN AUTO: 33 PG (ref 26.6–33)
MCHC RBC AUTO-ENTMCNC: 33.2 G/DL (ref 31.5–35.7)
MCHC RBC AUTO-ENTMCNC: 33.7 G/DL (ref 31.5–35.7)
MCV RBC AUTO: 96.1 FL (ref 79–97)
MCV RBC AUTO: 98 FL (ref 79–97)
MONOCYTES # BLD AUTO: 1.09 10*3/MM3 (ref 0.1–0.9)
MONOCYTES NFR BLD AUTO: 9.8 % (ref 5–12)
NEUTROPHILS NFR BLD AUTO: 73.3 % (ref 42.7–76)
NEUTROPHILS NFR BLD AUTO: 8.17 10*3/MM3 (ref 1.7–7)
NRBC BLD AUTO-RTO: 0 /100 WBC (ref 0–0.2)
PLATELET # BLD AUTO: 260 10*3/MM3 (ref 140–450)
PLATELET # BLD AUTO: 268 10*3/MM3 (ref 140–450)
PMV BLD AUTO: 10.1 FL (ref 6–12)
PMV BLD AUTO: 9.8 FL (ref 6–12)
POTASSIUM SERPL-SCNC: 4.1 MMOL/L (ref 3.5–5.2)
POTASSIUM SERPL-SCNC: 4.2 MMOL/L (ref 3.5–5.2)
PROT SERPL-MCNC: 6.3 G/DL (ref 6–8.5)
PROT SERPL-MCNC: 6.4 G/DL (ref 6–8.5)
RBC # BLD AUTO: 3.89 10*6/MM3 (ref 4.14–5.8)
RBC # BLD AUTO: 3.97 10*6/MM3 (ref 4.14–5.8)
SODIUM SERPL-SCNC: 130 MMOL/L (ref 136–145)
SODIUM SERPL-SCNC: 130 MMOL/L (ref 136–145)
WBC NRBC COR # BLD AUTO: 11.16 10*3/MM3 (ref 3.4–10.8)
WBC NRBC COR # BLD AUTO: 8.68 10*3/MM3 (ref 3.4–10.8)

## 2024-10-08 PROCEDURE — 97161 PT EVAL LOW COMPLEX 20 MIN: CPT

## 2024-10-08 PROCEDURE — 85027 COMPLETE CBC AUTOMATED: CPT | Performed by: NURSE PRACTITIONER

## 2024-10-08 PROCEDURE — 36415 COLL VENOUS BLD VENIPUNCTURE: CPT

## 2024-10-08 PROCEDURE — 70450 CT HEAD/BRAIN W/O DYE: CPT

## 2024-10-08 PROCEDURE — 97116 GAIT TRAINING THERAPY: CPT

## 2024-10-08 PROCEDURE — 82948 REAGENT STRIP/BLOOD GLUCOSE: CPT

## 2024-10-08 PROCEDURE — 63710000001 INSULIN LISPRO (HUMAN) PER 5 UNITS: Performed by: NURSE PRACTITIONER

## 2024-10-08 PROCEDURE — 72125 CT NECK SPINE W/O DYE: CPT

## 2024-10-08 PROCEDURE — 70486 CT MAXILLOFACIAL W/O DYE: CPT

## 2024-10-08 PROCEDURE — 80053 COMPREHEN METABOLIC PANEL: CPT | Performed by: NURSE PRACTITIONER

## 2024-10-08 RX ORDER — INSULIN LISPRO 100 [IU]/ML
2-7 INJECTION, SOLUTION INTRAVENOUS; SUBCUTANEOUS
Status: DISCONTINUED | OUTPATIENT
Start: 2024-10-08 | End: 2024-10-10 | Stop reason: HOSPADM

## 2024-10-08 RX ORDER — NITROGLYCERIN 0.4 MG/1
0.4 TABLET SUBLINGUAL
Status: DISCONTINUED | OUTPATIENT
Start: 2024-10-08 | End: 2024-10-10 | Stop reason: HOSPADM

## 2024-10-08 RX ORDER — ACETAMINOPHEN 325 MG/1
650 TABLET ORAL EVERY 4 HOURS PRN
Status: DISCONTINUED | OUTPATIENT
Start: 2024-10-08 | End: 2024-10-10 | Stop reason: HOSPADM

## 2024-10-08 RX ORDER — ASPIRIN 81 MG/1
81 TABLET ORAL DAILY
Status: DISCONTINUED | OUTPATIENT
Start: 2024-10-08 | End: 2024-10-10 | Stop reason: HOSPADM

## 2024-10-08 RX ORDER — BISACODYL 10 MG
10 SUPPOSITORY, RECTAL RECTAL DAILY PRN
Status: DISCONTINUED | OUTPATIENT
Start: 2024-10-08 | End: 2024-10-10 | Stop reason: HOSPADM

## 2024-10-08 RX ORDER — BISACODYL 5 MG/1
5 TABLET, DELAYED RELEASE ORAL DAILY PRN
Status: DISCONTINUED | OUTPATIENT
Start: 2024-10-08 | End: 2024-10-10 | Stop reason: HOSPADM

## 2024-10-08 RX ORDER — POLYETHYLENE GLYCOL 3350 17 G/17G
17 POWDER, FOR SOLUTION ORAL DAILY PRN
Status: DISCONTINUED | OUTPATIENT
Start: 2024-10-08 | End: 2024-10-10 | Stop reason: HOSPADM

## 2024-10-08 RX ORDER — PANTOPRAZOLE SODIUM 40 MG/1
40 TABLET, DELAYED RELEASE ORAL
Status: DISCONTINUED | OUTPATIENT
Start: 2024-10-08 | End: 2024-10-10 | Stop reason: HOSPADM

## 2024-10-08 RX ORDER — AMOXICILLIN 250 MG
2 CAPSULE ORAL 2 TIMES DAILY PRN
Status: DISCONTINUED | OUTPATIENT
Start: 2024-10-08 | End: 2024-10-10 | Stop reason: HOSPADM

## 2024-10-08 RX ORDER — ATORVASTATIN CALCIUM 20 MG/1
20 TABLET, FILM COATED ORAL DAILY
Status: DISCONTINUED | OUTPATIENT
Start: 2024-10-08 | End: 2024-10-10 | Stop reason: HOSPADM

## 2024-10-08 RX ORDER — ACETAMINOPHEN 160 MG/5ML
650 SOLUTION ORAL EVERY 4 HOURS PRN
Status: DISCONTINUED | OUTPATIENT
Start: 2024-10-08 | End: 2024-10-10 | Stop reason: HOSPADM

## 2024-10-08 RX ORDER — ATENOLOL 25 MG/1
12.5 TABLET ORAL DAILY
Status: DISCONTINUED | OUTPATIENT
Start: 2024-10-08 | End: 2024-10-10 | Stop reason: HOSPADM

## 2024-10-08 RX ORDER — ACETAMINOPHEN 650 MG/1
650 SUPPOSITORY RECTAL EVERY 4 HOURS PRN
Status: DISCONTINUED | OUTPATIENT
Start: 2024-10-08 | End: 2024-10-10 | Stop reason: HOSPADM

## 2024-10-08 RX ORDER — IBUPROFEN 600 MG/1
1 TABLET ORAL
Status: DISCONTINUED | OUTPATIENT
Start: 2024-10-08 | End: 2024-10-10 | Stop reason: HOSPADM

## 2024-10-08 RX ORDER — NICOTINE POLACRILEX 4 MG
15 LOZENGE BUCCAL
Status: DISCONTINUED | OUTPATIENT
Start: 2024-10-08 | End: 2024-10-10 | Stop reason: HOSPADM

## 2024-10-08 RX ORDER — PAROXETINE 10 MG/1
10 TABLET, FILM COATED ORAL EVERY MORNING
Status: DISCONTINUED | OUTPATIENT
Start: 2024-10-08 | End: 2024-10-10 | Stop reason: HOSPADM

## 2024-10-08 RX ORDER — FAMOTIDINE 20 MG/1
20 TABLET, FILM COATED ORAL 2 TIMES DAILY PRN
Status: DISCONTINUED | OUTPATIENT
Start: 2024-10-08 | End: 2024-10-10 | Stop reason: HOSPADM

## 2024-10-08 RX ORDER — DEXTROSE MONOHYDRATE 25 G/50ML
25 INJECTION, SOLUTION INTRAVENOUS
Status: DISCONTINUED | OUTPATIENT
Start: 2024-10-08 | End: 2024-10-10 | Stop reason: HOSPADM

## 2024-10-08 RX ORDER — ONDANSETRON 2 MG/ML
4 INJECTION INTRAMUSCULAR; INTRAVENOUS EVERY 6 HOURS PRN
Status: DISCONTINUED | OUTPATIENT
Start: 2024-10-08 | End: 2024-10-10 | Stop reason: HOSPADM

## 2024-10-08 RX ORDER — SODIUM CHLORIDE 0.9 % (FLUSH) 0.9 %
10 SYRINGE (ML) INJECTION AS NEEDED
Status: DISCONTINUED | OUTPATIENT
Start: 2024-10-08 | End: 2024-10-10 | Stop reason: HOSPADM

## 2024-10-08 RX ORDER — CETIRIZINE HYDROCHLORIDE 10 MG/1
5 TABLET ORAL 2 TIMES DAILY
Status: DISCONTINUED | OUTPATIENT
Start: 2024-10-08 | End: 2024-10-10 | Stop reason: HOSPADM

## 2024-10-08 RX ORDER — OXAZEPAM 10 MG
10 CAPSULE ORAL NIGHTLY PRN
Status: DISCONTINUED | OUTPATIENT
Start: 2024-10-08 | End: 2024-10-10 | Stop reason: HOSPADM

## 2024-10-08 RX ORDER — RANOLAZINE 500 MG/1
500 TABLET, EXTENDED RELEASE ORAL 2 TIMES DAILY
Status: DISCONTINUED | OUTPATIENT
Start: 2024-10-08 | End: 2024-10-10 | Stop reason: HOSPADM

## 2024-10-08 RX ADMIN — CETIRIZINE HYDROCHLORIDE 5 MG: 10 TABLET ORAL at 22:58

## 2024-10-08 RX ADMIN — RANOLAZINE 500 MG: 500 TABLET, FILM COATED, EXTENDED RELEASE ORAL at 22:58

## 2024-10-08 RX ADMIN — DICLOFENAC SODIUM 2 G: 10 GEL TOPICAL at 22:58

## 2024-10-08 RX ADMIN — CETIRIZINE HYDROCHLORIDE 5 MG: 10 TABLET ORAL at 11:17

## 2024-10-08 RX ADMIN — INSULIN LISPRO 3 UNITS: 100 INJECTION, SOLUTION INTRAVENOUS; SUBCUTANEOUS at 12:08

## 2024-10-08 RX ADMIN — PANTOPRAZOLE SODIUM 40 MG: 40 TABLET, DELAYED RELEASE ORAL at 11:18

## 2024-10-08 RX ADMIN — ATORVASTATIN CALCIUM 20 MG: 20 TABLET, FILM COATED ORAL at 11:17

## 2024-10-08 RX ADMIN — ASPIRIN 81 MG: 81 TABLET, COATED ORAL at 11:17

## 2024-10-08 RX ADMIN — PAROXETINE HYDROCHLORIDE 10 MG: 10 TABLET, FILM COATED ORAL at 12:08

## 2024-10-08 RX ADMIN — ACETAMINOPHEN 325MG 650 MG: 325 TABLET ORAL at 11:17

## 2024-10-08 RX ADMIN — RANOLAZINE 500 MG: 500 TABLET, FILM COATED, EXTENDED RELEASE ORAL at 11:18

## 2024-10-08 RX ADMIN — INSULIN LISPRO 2 UNITS: 100 INJECTION, SOLUTION INTRAVENOUS; SUBCUTANEOUS at 08:52

## 2024-10-08 RX ADMIN — ATENOLOL 12.5 MG: 25 TABLET ORAL at 11:17

## 2024-10-08 RX ADMIN — DICLOFENAC SODIUM 2 G: 10 GEL TOPICAL at 12:09

## 2024-10-08 RX ADMIN — ACETAMINOPHEN 325MG 650 MG: 325 TABLET ORAL at 23:52

## 2024-10-08 NOTE — CASE MANAGEMENT/SOCIAL WORK
Discharge Planning Assessment  [facility]     Patient Name: Shaheen Saini  MRN: [id number]  Today's Date: 10/8/2024    Admit Date: 10/7/2024    [facility]   Discharge Needs Assessment       Row Name 10/08/24 1656       Living Environment    People in Home child(wei), adult    Current Living Arrangements home    Primary Care Provided by self    Provides Primary Care For no one    Family Caregiver if Needed child(wei), adult    Quality of Family Relationships helpful;involved;supportive    Able to Return to Prior Arrangements yes       Resource/Environmental Concerns    Transportation Concerns none       Transition Planning    Patient/Family Anticipates Transition to home with family;home with help/services    Patient/Family Anticipated Services at Transition     Transportation Anticipated family or friend will provide       Discharge Needs Assessment    Readmission Within the Last 30 Days no previous admission in last 30 days    Equipment Currently Used at Home cane, straight;rollator    Discharge Facility/Level of Care Needs home with home health    Provided Post Acute Provider List? N/A    N/A Provider List Comment Declined; [facility]    Patient's [facility] of Community Agency([facility]                   Discharge Plan       Row Name 10/08/24 1656       Plan    [facility]    Patient/Family in Agreement with Plan yes    Plan Comments Spoke with the patient and he asked for [facility] to discuss all discharge planning information with his daughter/Tarah Saini ([phone]). CCP spoke with Tarah, verified current information and explained the role of the CCP. Patient lives with Tarah and has family support. He's overall IADL and uses both a rollator and cane. He has no history with  and [facility]. Tarah plans for the patient to discharge home with family support and continue working with  [facility] . Referral sent in [facility]. CCP will follow.                  Continued Care and Services - Admitted Since 10/7/2024       Home Medical Care       Service Provider Request Status Selected Services Address Phone Fax Patient Preferred    [facility] Pending - Request Sent N/A [id number], [place] [id number] [phone] --                  Selected Continued Care - Prior Encounters Includes continued care and service providers with selected services from prior encounters from 7/9/2024 to 10/8/2024      Discharged on 8/21/2024 Admission date: 8/17/2024 - Discharge disposition: Home-Health Care Svc      Home Medical Care       Service Provider Selected Services Address Phone Fax Patient Preferred    [facility] [id number], [place] [phone] --                             Demographic Summary       Row Name 10/08/24 1655       General Information    Admission Type inpatient    Reason for Consult discharge planning    Preferred Language English       Contact Information    Permission Granted to Share Info With ;family/designee                   Functional Status       Row Name 10/08/24 1655       Functional Status    Usual Activity Tolerance good       Functional Status, IADL    Medications independent    Meal Preparation assistive equipment    Housekeeping assistive equipment    Laundry assistive equipment    Shopping assistive equipment       Mental Status    General Appearance WDL WDL       Mental Status Summary    Recent Changes in Mental Status/Cognitive Functioning no changes                   Psychosocial       Row Name 10/08/24 1655       Intellectual Performance WDL    Level of Consciousness Alert       Coping/Stress    Patient Personal Strengths able to adapt    Sources of Support adult child(wei)    Reaction to Health Status  accepting    Understanding of Condition and Treatment adequate understanding of medical condition;adequate understanding of treatment       Developmental Stage (Eriksson's)    Developmental Stage Stage 8 (66 years-death/Late Adulthood) Integrity vs. Despair                    Mary Lou PEREIRA RN

## 2024-10-08 NOTE — H&P
Patient Name:  Shaheen Saini  YOB: 1935  MRN:  [id number]  Admit Date:  10/7/2024  Patient Care Team:  Pal Lawrence MD as PCP - General (Family Medicine)  Tommy Ellis MD as Consulting Physician (Cardiology)      Subjective   History Present Illness     Chief Complaint   Patient presents with    Fall       Mr. Saini is a 89 y.o. male with a history of PAF, diabetes, CAD, hypertension, HLD, recent cholecystectomy that presents to [facility] complaining of injury to the face after a fall prior to arrival. He was found to have nasal bone fracture and was admitted to the hospital for further evaluation and treatment.  Patient states he has been feeling much better, getting his strength back after gallbladder surgery in mid August. He says feeling better is what caused him to fall because he was holding his cane but not utilizing the cane, tripped and lost his balance. Landed on face and right side. He sustained facial bruising and skin abrasions to the right arm. He did not loose consciousness but it was a hard fall that stunned him for a minute. He denies vision disturbance, dizziness, lightheadedness, CP, palpitations or SOA prior to or after the fall. Currently has no vision problems, breathing difficulty or headache. His neck, arms and upper body are sore post fall. No hip, knee or back pain. He nearly fell in the ED while trying to walk, fell forward. He says he got up too fast when the nurse was helping him downstairs. He was notably unsteady on his feet for staff.       Review of Systems   Constitutional:  Negative for activity change, appetite change, chills, diaphoresis, fatigue and fever.   HENT:  Negative for congestion and rhinorrhea.    Eyes:  Negative for visual disturbance.   Respiratory:  Negative for cough and shortness of breath.    Cardiovascular:  Negative for chest pain and leg swelling.   Gastrointestinal:  Negative for abdominal pain, constipation,  diarrhea and nausea.   Genitourinary:  Negative for difficulty urinating and dysuria.   Skin:  Positive for wound. Negative for rash.   Neurological:  Negative for dizziness, weakness, light-headedness and headaches.        Personal History     Past Medical History:   Diagnosis Date    CAD (coronary artery disease)     Cholelithiasis 8/18/2024    Removed Gallbladder also stones    Diabetes mellitus     GERD (gastroesophageal reflux disease)     History of cardiac cath     7/23/12 - LVEF 65%. 1+ MV insufficiency. Three vessel CAD. Three of four bypass grafts remain patent. The vein graft to the third marginal branch is a previously documented occlusion. The distal circumflex is unchanged from angiogram 5/4/09. 5/4/09 - LVEF 65%.Three vessel CAD s/p CABG. One fraft to marginal branch is occluded, the rest of the grafts are patent. The marginal branch is diffusely dis    History of echocardiogram     1/9/18 - LA mildly dilated. Normal LV function. Mild MR. Mild-moderate TR. Mild AR. 7/22/2012 - EF 60-65%. Moderate MI, TI, and PI. Mild AI.    Hyperlipidemia     Hypertension     Paroxysmal atrial fibrillation      Past Surgical History:   Procedure Laterality Date    CHOLECYSTECTOMY WITH INTRAOPERATIVE CHOLANGIOGRAM N/A 08/18/2024    Procedure: CHOLECYSTECTOMY LAPAROSCOPIC INTRAOPERATIVE CHOLANGIOGRAM;  Surgeon: Inocencia Vieira MD;  Location: [facility];  Service: General;  Laterality: N/A;    CORONARY ARTERY BYPASS GRAFT  09/06/2005    CABG x4. Internal mammary artery to LAD, vein graft to diagonal artery, vein graft to OM, vein graft to posterior descending artery.    EYE SURGERY      Cataracts removed     Family History   Problem Relation Age of Onset    Heart disease Mother     Arthritis Mother      Social History     Tobacco Use    Smoking status: Never    Smokeless tobacco: Never   Vaping Use    Vaping status: Never Used   Substance Use Topics    Alcohol use: No    Drug use: Never     No current  facility-administered medications on file prior to encounter.     Current Outpatient Medications on File Prior to Encounter   Medication Sig Dispense Refill    ACCU-CHEK SOFTCLIX LANCETS lancets 1 each by Other route Daily.      aspirin 81 MG EC tablet Take 1 tablet by mouth Daily.      atenolol (TENORMIN) 25 MG tablet Take 0.5 tablets by mouth Daily.      atorvastatin (LIPITOR) 20 MG tablet Take 1 tablet by mouth Daily.      Blood Glucose Monitoring Suppl (ACCU-CHEK GUIDE) w/Device kit 1 Device Daily.      esomeprazole (nexIUM) 20 MG capsule Take 1 capsule by mouth Every Morning Before Breakfast.      fexofenadine-pseudoephedrine (ALLEGRA-D)  MG per 12 hr tablet Take 1 tablet by mouth 2 (Two) Times a Day.      glipizide (GLUCOTROL XL) 5 MG ER tablet Take 1 tablet by mouth Daily.      glucose blood test strip 1 each by Other route Daily.      multivitamin with minerals (MULTIVITAMIN ADULT PO) Take 1 tablet by mouth Daily.      nitroglycerin (NITROSTAT) 0.4 MG SL tablet Place 1 tablet under the tongue Every 5 (Five) Minutes As Needed for Chest Pain. Take no more than 3 doses in 15 minutes.      oxazepam (SERAX) 15 MG capsule Take 1 capsule by mouth Daily.      PARoxetine (PAXIL) 10 MG tablet Take 1 tablet by mouth Every Morning.      ranolazine (RANEXA) 500 MG 12 hr tablet Take 1 tablet by mouth 2 (Two) Times a Day. 180 tablet 3    vitamin C (ASCORBIC ACID) 500 MG tablet Take 1 tablet by mouth.       No Known Allergies    Objective    Objective     Vital Signs  Temp:  [97.2 °F (36.2 °C)-97.7 °F (36.5 °C)] 97.7 °F (36.5 °C)  Heart Rate:  [68-80] 80  Resp:  [16-18] 18  BP: (137-168)/(62-74) 168/74  SpO2:  [96 %-98 %] 96 %  on   ;   Device (Oxygen Therapy): room air  Body mass index is 28.98 kg/m².    Physical Exam  Constitutional:       Appearance: He is not toxic-appearing.      Comments: Elderly and chronically ill-appearing.  No acute distress.   HENT:      Head: Normocephalic and atraumatic.      Comments:  Wearing glasses.  Multiple large bruises to face and forehead. 1 small lacerations.      Mouth/Throat:      Mouth: Mucous membranes are moist.   Eyes:      Extraocular Movements: Extraocular movements intact.      Conjunctiva/sclera: Conjunctivae normal.      Pupils: Pupils are equal, round, and reactive to light.   Cardiovascular:      Rate and Rhythm: Normal rate and regular rhythm.      Pulses: Normal pulses.      Heart sounds: Normal heart sounds. No murmur heard.  Pulmonary:      Effort: Pulmonary effort is normal. No respiratory distress.      Breath sounds: Normal breath sounds.   Abdominal:      General: Bowel sounds are normal. There is no distension.      Palpations: Abdomen is soft.      Tenderness: There is no abdominal tenderness.   Musculoskeletal:         General: No swelling or tenderness. Normal range of motion.      Cervical back: Normal range of motion.   Skin:     General: Skin is warm and dry.   Neurological:      General: No focal deficit present.      Mental Status: He is alert and oriented to person, place, and time.   Psychiatric:         Mood and Affect: Mood normal.         Results Review:  I reviewed the patient's new clinical results.      Lab Results (last 24 hours)       Procedure Component Value Units Date/Time    CBC & Differential [[id number]]  (Abnormal) Collected: 10/07/24 2357    Specimen: Blood Updated: 10/08/24 0014    Narrative:      The following orders were created for panel order CBC & Differential.  Procedure                               Abnormality         Status                     ---------                               -----------         ------                     CBC Auto Differential[[id number]]        Abnormal            Final result                 Please view results for these tests on the individual orders.    Comprehensive Metabolic Panel [[id number]]  (Abnormal) Collected: 10/07/24 2357    Specimen: Blood Updated: 10/08/24 0106     Glucose 210 mg/dL      BUN 15  mg/dL      Creatinine 0.81 mg/dL      Sodium 130 mmol/L      Potassium 4.2 mmol/L      Comment: Slight hemolysis detected by analyzer. Result may be falsely elevated.        Chloride 97 mmol/L      CO2 22.3 mmol/L      Calcium 8.9 mg/dL      Total Protein 6.4 g/dL      Albumin 3.6 g/dL      ALT (SGPT) 20 U/L      AST (SGOT) 17 U/L      Alkaline Phosphatase 76 U/L      Total Bilirubin 0.4 mg/dL      Globulin 2.8 gm/dL      A/G Ratio 1.3 g/dL      BUN/Creatinine Ratio 18.5     Anion Gap 10.7 mmol/L      eGFR 84.8 mL/min/1.73     Narrative:      GFR Normal >60  Chronic Kidney Disease <60  Kidney Failure <15    The GFR formula is only valid for adults with stable renal function between ages 18 and 70.    CBC Auto Differential [[id number]]  (Abnormal) Collected: 10/07/24 2358    Specimen: Blood Updated: 10/08/24 0014     WBC 11.16 10*3/mm3      RBC 3.97 10*6/mm3      Hemoglobin 13.1 g/dL      Hematocrit 38.9 %      MCV 98.0 fL      MCH 33.0 pg      MCHC 33.7 g/dL      RDW 13.5 %      RDW-SD 47.9 fl      MPV 10.1 fL      Platelets 268 10*3/mm3      Neutrophil % 73.3 %      Lymphocyte % 12.5 %      Monocyte % 9.8 %      Eosinophil % 3.3 %      Basophil % 0.4 %      Immature Grans % 0.7 %      Neutrophils, Absolute 8.17 10*3/mm3      Lymphocytes, Absolute 1.40 10*3/mm3      Monocytes, Absolute 1.09 10*3/mm3      Eosinophils, Absolute 0.37 10*3/mm3      Basophils, Absolute 0.05 10*3/mm3      Immature Grans, Absolute 0.08 10*3/mm3      nRBC 0.0 /100 WBC     CBC (No Diff) [[id number]]  (Abnormal) Collected: 10/08/24 0624    Specimen: Blood Updated: 10/08/24 0635     WBC 8.68 10*3/mm3      RBC 3.89 10*6/mm3      Hemoglobin 12.4 g/dL      Hematocrit 37.4 %      MCV 96.1 fL      MCH 31.9 pg      MCHC 33.2 g/dL      RDW 13.4 %      RDW-SD 47.3 fl      MPV 9.8 fL      Platelets 260 10*3/mm3     Comprehensive Metabolic Panel [[id number]]  (Abnormal) Collected: 10/08/24 0624    Specimen: Blood Updated: 10/08/24 0653     Glucose 180  mg/dL      BUN 13 mg/dL      Creatinine 0.65 mg/dL      Sodium 130 mmol/L      Potassium 4.1 mmol/L      Chloride 98 mmol/L      CO2 24.0 mmol/L      Calcium 9.1 mg/dL      Total Protein 6.3 g/dL      Albumin 3.6 g/dL      ALT (SGPT) 21 U/L      AST (SGOT) 23 U/L      Alkaline Phosphatase 80 U/L      Total Bilirubin 0.4 mg/dL      Globulin 2.7 gm/dL      A/G Ratio 1.3 g/dL      BUN/Creatinine Ratio 20.0     Anion Gap 8.0 mmol/L      eGFR 90.6 mL/min/1.73     Narrative:      GFR Normal >60  Chronic Kidney Disease <60  Kidney Failure <15    The GFR formula is only valid for adults with stable renal function between ages 18 and 70.    POC Glucose Once [[id number]]  (Abnormal) Collected: 10/08/24 0827    Specimen: Blood Updated: 10/08/24 0828     Glucose 163 mg/dL             Imaging Results (Last 24 Hours)       Procedure Component Value Units Date/Time    CT Facial Bones Without Contrast [[id number]] Collected: 10/08/24 0148     Updated: 10/08/24 0148    Narrative:        Patient: ALEN WU  Time Out: 01:47  Exam(s): CT FACIAL Without Contrast     EXAM:    CT Head and Maxillofacial Without Intravenous Contrast    CLINICAL HISTORY:     Reason for exam: trauma.    TECHNIQUE:    Axial computed tomography images of the head brain and face without   intravenous contrast.  CTDI is 24.27 mGy and DLP is 491.9 mGy-cm.  This   CT exam was performed according to the principle of ALARA (As Low As   Reasonably Achievable) by using one or more of the following dose   reduction techniques: automated exposure control, adjustment of the mA   and or kV according to patient size, and or use of iterative   reconstruction technique.    COMPARISON:    No relevant prior studies available.    FINDINGS:    Bones joints:  Acute nasal bone fracture.  Remaining facial bones are   intact     Soft tissues:  Unremarkable.    Sinuses:  No acute sinusitis.    Mastoid air cells:  No mastoid effusion.    Orbits:  Unremarkable.    IMPRESSION:          Acute nasal bone fracture.  Remaining facial bones are intact       Impression:          Electronically signed by Misbah Garcia MD on 10-08-24 at 0147    CT Head Without Contrast [[id number]] Collected: 10/08/24 0147     Updated: 10/08/24 0147    Narrative:        Patient: ALEN WU  Time Out: 01:46  Exam(s): CT HEAD Without Contrast     EXAM:    CT Head Without Intravenous Contrast    CLINICAL HISTORY:     Reason for exam: fall with facial injury.    TECHNIQUE:    Axial computed tomography images of the head brain without intravenous   contrast.  CTDI is 55.7 mGy and DLP is 991.9 mGy-cm.  This CT exam was   performed according to the principle of ALARA (As Low As Reasonably   Achievable) by using one or more of the following dose reduction   techniques: automated exposure control, adjustment of the mA and or kV   according to patient size, and or use of iterative reconstruction   technique.    COMPARISON:    No relevant prior studies available.    FINDINGS:    Brain:  No hemorrhage, herniation, or mass effect.  Chronic   microvascular ischemic changes.    Ventricles:  No hydrocephalus.  Age related cerebral volume loss.    Bones joints:  Unremarkable.    Soft tissues:  Unremarkable.    Sinuses:  No air fluid levels.    Mastoid air cells:  Clear.    IMPRESSION:         No acute hemorrhage, hydrocephalus, or mass effect.      Impression:          Electronically signed by Misbah Garcia MD on 10-08-24 at 0146    CT Cervical Spine Without Contrast [[id number]] Collected: 10/08/24 0132     Updated: 10/08/24 0132    Narrative:        Patient: ALEN WU  Time Out: 01:31  Exam(s): CT C SPINE     EXAM:    CT Cervical Spine Without Intravenous Contrast    CLINICAL HISTORY:     Reason for exam: fall with facial injury and tingling in hands..    TECHNIQUE:    Axial computed tomography images of the cervical spine without   intravenous contrast.  CTDI is 17.94 mGy and DLP is 440.4 mGy-cm.  This   CT exam was performed  according to the principle of ALARA (As Low As   Reasonably Achievable) by using one or more of the following dose   reduction techniques: automated exposure control, adjustment of the mA   and or kV according to patient size, and or use of iterative   reconstruction technique.    COMPARISON:    No relevant prior studies available.    FINDINGS:    Vertebrae:  No acute fracture.    Discs spinal canal neural foramina:  degenerative changes.    Soft tissues:  No prevertebral swelling.    IMPRESSION:         No acute fracture or subluxation.      Impression:          Electronically signed by Misbah Garcia MD on 10-08-24 at 0131            Results for orders placed during the hospital encounter of 08/17/24    Adult Transthoracic Echo Complete W/ Cont if Necessary Per Protocol    Interpretation Summary    Left ventricular systolic function is normal. Left ventricular ejection fraction appears to be 61 - 65%.    Left ventricular diastolic function was normal.    Mild aortic valve stenosis is present.  Mild aortic regurgitation.    Peak velocity of the flow distal to the aortic valve is 241 cm/s. Aortic valve maximum pressure gradient is 23 mmHg. Aortic valve mean pressure gradient is 13 mmHg. Aortic valve dimensionless index is 0.5 .    Estimated right ventricular systolic pressure from tricuspid regurgitation is mildly elevated (35-45 mmHg). Calculated right ventricular systolic pressure from tricuspid regurgitation is 41 mmHg.      No orders to display        Assessment/Plan     Active Hospital Problems    Diagnosis  POA    **Closed head injury [S09.90XA]  Yes    Fall [W19.XXXA]  Unknown    Type 2 diabetes mellitus, without long-term current use of insulin [E11.9]  Yes    PAF (paroxysmal atrial fibrillation) [I48.0]  Yes    History of coronary artery bypass surgery [Z95.1]  Not Applicable    Hyperlipidemia [E78.5]  Yes    Hypertension [I10]  Yes    GERD (gastroesophageal reflux disease) [K21.9]  Yes    CAD (coronary  artery disease) [I25.10]  Yes      Resolved Hospital Problems   No resolved problems to display.       Nasal bone fracture and facial injuries status post fall  CT head negative. CT facial bones noting nasal fracture. No indication of interference with his airway.  Pain control.  Consult PT/OT.  Check orthostatic BP and heart rate.  Neck soreness  CT cervical spine negative acute. Ice to neck QID prn, on for 20 minutes then remove. Start Voltaren gel BID. Consider muscle relaxer if no relief but use caution  PAF  Resume atenolol 12.5 daily and Ranexa 500 mg twice daily.  Resume aspirin 81 mg daily.  CAD  HLD  Continue aspirin statin.  No concentrated sweets healthy heart diet.  HTN  Stable, controlled.  Continue to 12 mg daily.  DM2  Hold home regimen and cover with lispro low-dose SSI.  Accu-Cheks before meals and at bedtime.  No concentrated sweets healthy heart diet.  GERD  Continue PPI.  Anxiety  Resume Oxazepam po nightly,change to prn and lower dose to 10 mg. Daughter says he is too sleepy if taken during the day. Recommend follow up with PCP or psych to evaluate anxiety plan and see if there is another effective option for this 89 year old patient now at higher risk for falls.     I discussed the patient's findings and my recommendations with patient and family.    VTE Prophylaxis - SCDs.  Code Status - Full code.       LINDA Leon  [facility]  10/08/24  11:20 EDT

## 2024-10-08 NOTE — ED NOTES
Nursing report ED to floor  Shaheen Saini  89 y.o.  male    Shaheen Saini is a 89 y.o. male who presents to the ED c/o sudden onset of a head and facial injury that occurred status post fall just prior to ED arrival today.  He reports that he is walker/cane dependent but was walking without those when he lost his balance causing him to fall forward striking his head and face on the ground.  He did report that he had some numbness and tingling to his hands and fingers bilaterally however that has significantly improved since the onset.  He denies loss of consciousness, headache, chest pain, shortness of breath, back pain, extremity pain, or nausea/vomiting.       HPI :  HPI (Adult)  Stated Reason for Visit: To ER via EMS from home.  Pt states he was attempting to walk without his cane and fell striking floor. Pt has abrasions to forehead, bridge of nose.  Pt also has skin tears to rt elbow, forehead, rt hand.      Pt also c/o tingling to bilat tips of fingers that is getting better per pt.     C-collar placed at triage.  History Obtained From: patient, EMS    Chief Complaint  Chief Complaint   Patient presents with    Fall       Admitting doctor:   Ryan Moreland MD    Admitting diagnosis:   The primary encounter diagnosis was Fall from standing, initial encounter. Diagnoses of Closed head injury, initial encounter, Closed fracture of nasal bone, initial encounter, Unable to ambulate, Strain of neck muscle, initial encounter, and Multiple skin tears were also pertinent to this visit.    Code status:   Current Code Status       Date Active Code Status Order ID Comments User Context       10/8/2024 0313 CPR (Attempt to Resuscitate) [id number]  Shruthi Bradford, APRN ED        Question Answer    Code Status (Patient has no pulse and is not breathing) CPR (Attempt to Resuscitate)    Medical Interventions (Patient has pulse or is breathing) Full Support                    Allergies:   Patient has no known  "allergies.    Isolation:   No active isolations    Intake and Output  No intake or output data in the 24 hours ending 10/08/24 0708    Weight:       10/07/24  2257   Weight: 91.6 kg (202 lb)       Most recent vitals:   Vitals:    10/07/24 2257 10/08/24 0217 10/08/24 0317 10/08/24 0631   BP:  148/62 143/72 137/67   BP Location:  Left arm     Patient Position:  Lying     Pulse:  68 80 71   Resp:  16     Temp:       SpO2:  98% 97% 96%   Weight: 91.6 kg (202 lb)      Height: 177.8 cm (70\")          Active LDAs/IV Access:   Lines, Drains & Airways       Active LDAs       Name Placement date Placement time Site Days    Peripheral IV 08/19/24 Right Forearm 08/19/24  --  Forearm  50    Peripheral IV 10/08/24 0229 Anterior;Left Forearm 10/08/24  0229  Forearm  less than 1    Closed/Suction Drain Abdomen Bulb 19 Fr. 08/18/24  1156  Abdomen  50                    Labs (abnormal labs have a star):   Labs Reviewed   COMPREHENSIVE METABOLIC PANEL - Abnormal; Notable for the following components:       Result Value    Glucose 210 (*)     Sodium 130 (*)     Chloride 97 (*)     All other components within normal limits    Narrative:     GFR Normal >60  Chronic Kidney Disease <60  Kidney Failure <15    The GFR formula is only valid for adults with stable renal function between ages 18 and 70.   CBC WITH AUTO DIFFERENTIAL - Abnormal; Notable for the following components:    WBC 11.16 (*)     RBC 3.97 (*)     MCV 98.0 (*)     Lymphocyte % 12.5 (*)     Immature Grans % 0.7 (*)     Neutrophils, Absolute 8.17 (*)     Monocytes, Absolute 1.09 (*)     Immature Grans, Absolute 0.08 (*)     All other components within normal limits   CBC (NO DIFF) - Abnormal; Notable for the following components:    RBC 3.89 (*)     Hemoglobin 12.4 (*)     Hematocrit 37.4 (*)     All other components within normal limits   COMPREHENSIVE METABOLIC PANEL - Abnormal; Notable for the following components:    Glucose 180 (*)     Creatinine 0.65 (*)     Sodium 130 " (*)     All other components within normal limits    Narrative:     GFR Normal >60  Chronic Kidney Disease <60  Kidney Failure <15    The GFR formula is only valid for adults with stable renal function between ages 18 and 70.   POCT GLUCOSE FINGERSTICK   POCT GLUCOSE FINGERSTICK   POCT GLUCOSE FINGERSTICK   POCT GLUCOSE FINGERSTICK   POCT GLUCOSE FINGERSTICK   CBC AND DIFFERENTIAL    Narrative:     The following orders were created for panel order CBC & Differential.  Procedure                               Abnormality         Status                     ---------                               -----------         ------                     CBC Auto Differential[[id number]]        Abnormal            Final result                 Please view results for these tests on the individual orders.       EKG:   No orders to display       Meds given in ED:   Medications   nitroglycerin (NITROSTAT) SL tablet 0.4 mg (has no administration in time range)   sodium chloride 0.9 % flush 10 mL (has no administration in time range)   acetaminophen (TYLENOL) tablet 650 mg (has no administration in time range)     Or   acetaminophen (TYLENOL) 160 MG/5ML oral solution 650 mg (has no administration in time range)     Or   acetaminophen (TYLENOL) suppository 650 mg (has no administration in time range)   famotidine (PEPCID) tablet 20 mg (has no administration in time range)   sennosides-docusate (PERICOLACE) 8.6-50 MG per tablet 2 tablet (has no administration in time range)     And   polyethylene glycol (MIRALAX) packet 17 g (has no administration in time range)     And   bisacodyl (DULCOLAX) EC tablet 5 mg (has no administration in time range)     And   bisacodyl (DULCOLAX) suppository 10 mg (has no administration in time range)   ondansetron (ZOFRAN) injection 4 mg (has no administration in time range)   melatonin tablet 5 mg (has no administration in time range)   dextrose (GLUTOSE) oral gel 15 g (has no administration in time range)    dextrose (D50W) (25 g/50 mL) IV injection 25 g (has no administration in time range)   glucagon (GLUCAGEN) injection 1 mg (has no administration in time range)   insulin lispro (HUMALOG/ADMELOG) injection 2-7 Units (has no administration in time range)       Imaging results:  CT Facial Bones Without Contrast    Result Date: 10/8/2024  Electronically signed by Misbah Garcia MD on 10-08-24 at 0147    CT Head Without Contrast    Result Date: 10/8/2024  Electronically signed by Misbah Garcia MD on 10-08-24 at 0146    CT Cervical Spine Without Contrast    Result Date: 10/8/2024  Electronically signed by Misbah Garcia MD on 10-08-24 at 0131         Social issues:   Social History     Socioeconomic History    Marital status:    Tobacco Use    Smoking status: Never    Smokeless tobacco: Never   Vaping Use    Vaping status: Never Used   Substance and Sexual Activity    Alcohol use: No    Drug use: Never    Sexual activity: Defer       Peripheral Neurovascular  Peripheral Neurovascular (Adult)  Peripheral Neurovascular WDL: WDL    Neuro Cognitive  Neuro Cognitive (Adult)  Cognitive/Neuro/Behavioral WDL: WDL  Pupils  Pupil PERRLA: yes    Learning  Learning Assessment (Adult)  Learning Readiness and Ability: no barriers identified    Respiratory  Respiratory (Adult)  Airway WDL: WDL  Respiratory WDL  Respiratory WDL: WDL    Abdominal Pain       Pain Assessments  Pain (Adult)  (0-10) Pain Rating: Rest: 0    NIH Stroke Scale       Viky Nolan RN  10/08/24 07:08 EDT

## 2024-10-08 NOTE — ED NOTES
Abrasions cleans with sterile water and Hibaclens. Triple antibiotic ointment applied  to all wound. Face abrasions left open to air. Left arm skin tears triple antibiotic applied, petroleum gauze, 4x4's and corey wrap 4 inch. Patient tolerated well. Family at bedside.

## 2024-10-08 NOTE — CONSULTS
ENDY:  Contacted by nursing for consult on patient.  Status post fall striking face.  CT head negative.  Patient complaining of numbness and tingling in arms. RIOS. ER notes from earlier this morning indicate patient had similar complaints at admission but reported improvement since initial fall.  Nursing notes also indicate that he is unsteady on his feet. CT cervical reveals no fractures. 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  There is moderate to severe canal stenosis at C3/4 ,C4/5 C5/6, C6/7 due to disc osteophyte complex.  No obvious acute disc herniations.  Facet arthropathy most predominant at C7 bilaterally, right greater than left.  MRI cervical spine ordered.  Full consult to follow tomorrow. Discussed with Dr. Irwin.

## 2024-10-08 NOTE — ED TRIAGE NOTES
To ER via EMS from home.  Pt states he was attempting to walk without his cane and fell striking floor. Pt has abrasions to forehead, bridge of nose.  Pt also has skin tears to rt elbow, forehead, rt hand.      Pt also c/o tingling to bilat tips of fingers that is getting better per pt.     C-collar placed at triage.

## 2024-10-08 NOTE — ED PROVIDER NOTES
EMERGENCY DEPARTMENT ENCOUNTER    Room Number:  20/20  PCP: Pal Lawrence MD  Historian: Patient      HPI:  Chief Complaint: Fall  A complete HPI/ROS/PMH/PSH/SH/FH are unobtainable due to: None  Context: Shaheen Saini is a 89 y.o. male who presents to the ED c/o sudden onset of a head and facial injury that occurred status post fall just prior to ED arrival today.  He reports that he is walker/cane dependent but was walking without those when he lost his balance causing him to fall forward striking his head and face on the ground.  He did report that he had some numbness and tingling to his hands and fingers bilaterally however that has significantly improved since the onset.  He denies loss of consciousness, headache, chest pain, shortness of breath, back pain, extremity pain, or nausea/vomiting.            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    Cellulitis of right lower extremity 10/20/2022    Type 2 diabetes mellitus, without long-term current use of insulin 10/20/2022    PAF (paroxysmal atrial fibrillation) 10/20/2022    Gangrenous cholecystitis 08/17/2024     Resolved Ambulatory Problems     Diagnosis Date Noted    Paroxysmal atrial fibrillation     ARF (acute renal failure) 08/17/2024    Hyponatremia 08/17/2024    Leukocytosis 08/17/2024     Past Medical History:   Diagnosis Date    Cholelithiasis 8/18/2024         PAST SURGICAL HISTORY  Past Surgical History:   Procedure Laterality Date    CHOLECYSTECTOMY WITH INTRAOPERATIVE CHOLANGIOGRAM N/A 08/18/2024    Procedure: CHOLECYSTECTOMY LAPAROSCOPIC INTRAOPERATIVE CHOLANGIOGRAM;  Surgeon: Inocencia Vieira MD;  Location: [facility]  OR;  Service: General;  Laterality: N/A;    CORONARY ARTERY BYPASS GRAFT  09/06/2005    CABG x4. Internal mammary artery to LAD, vein graft to diagonal artery, vein graft to OM, vein graft to posterior descending artery.    EYE SURGERY      Cataracts removed         FAMILY HISTORY  Family History   Problem Relation Age of Onset    Heart disease Mother     Arthritis Mother          SOCIAL HISTORY  Social History     Socioeconomic History    Marital status:    Tobacco Use    Smoking status: Never    Smokeless tobacco: Never   Vaping Use    Vaping status: Never Used   Substance and Sexual Activity    Alcohol use: No    Drug use: Never    Sexual activity: Defer         ALLERGIES  Patient has no known allergies.        REVIEW OF SYSTEMS  Review of Systems   Constitutional:  Negative for activity change, appetite change and fever.   HENT:  Negative for congestion and sore throat.    Eyes: Negative.    Respiratory:  Negative for cough and shortness of breath.    Cardiovascular:  Negative for chest pain and leg swelling.   Gastrointestinal:  Negative for abdominal pain, diarrhea and vomiting.   Endocrine: Negative.    Genitourinary:  Negative for decreased urine volume and dysuria.   Musculoskeletal:  Negative for neck pain.   Skin:  Negative for rash and wound.   Allergic/Immunologic: Negative.    Neurological:  Negative for weakness, numbness and headaches.   Hematological: Negative.    Psychiatric/Behavioral: Negative.     All other systems reviewed and are negative.         PHYSICAL EXAM  ED Triage Vitals [10/07/24 2256]   Temp Heart Rate Resp BP SpO2   97.2 °F (36.2 °C) 72 16 154/67 98 %      Temp src Heart Rate Source Patient Position BP Location FiO2 (%)   -- -- -- -- --       Physical Exam  Constitutional:       General: He is not in acute distress.     Appearance: Normal appearance. He is not ill-appearing or toxic-appearing.   HENT:      Head: Normocephalic.      Comments: Nasal swelling with facial  abrasions,     Nose:      Comments: No septal hematoma  Eyes:      Extraocular Movements: Extraocular movements intact.      Pupils: Pupils are equal, round, and reactive to light.   Cardiovascular:      Rate and Rhythm: Normal rate and regular rhythm.      Heart sounds: No murmur heard.     No friction rub. No gallop.   Pulmonary:      Effort: Pulmonary effort is normal.      Breath sounds: Normal breath sounds.   Abdominal:      General: Abdomen is flat. There is no distension.      Palpations: Abdomen is soft.      Tenderness: There is no abdominal tenderness.   Musculoskeletal:         General: No swelling or tenderness. Normal range of motion.      Cervical back: Normal range of motion and neck supple.   Skin:     General: Skin is warm and dry.      Comments: Skin tears to the right upper extremity as well as previously mentioned facial abrasions   Neurological:      General: No focal deficit present.      Mental Status: He is alert and oriented to person, place, and time.      Sensory: No sensory deficit.      Motor: No weakness.   Psychiatric:         Mood and Affect: Mood normal.         Behavior: Behavior normal.         Vital signs and nursing notes reviewed.          LAB RESULTS  Recent Results (from the past 24 hour(s))   Comprehensive Metabolic Panel    Collection Time: 10/07/24 11:57 PM    Specimen: Blood   Result Value Ref Range    Glucose 210 (H) 65 - 99 mg/dL    BUN 15 8 - 23 mg/dL    Creatinine 0.81 0.76 - 1.27 mg/dL    Sodium 130 (L) 136 - 145 mmol/L    Potassium 4.2 3.5 - 5.2 mmol/L    Chloride 97 (L) 98 - 107 mmol/L    CO2 22.3 22.0 - 29.0 mmol/L    Calcium 8.9 8.6 - 10.5 mg/dL    Total Protein 6.4 6.0 - 8.5 g/dL    Albumin 3.6 3.5 - 5.2 g/dL    ALT (SGPT) 20 1 - 41 U/L    AST (SGOT) 17 1 - 40 U/L    Alkaline Phosphatase 76 39 - 117 U/L    Total Bilirubin 0.4 0.0 - 1.2 mg/dL    Globulin 2.8 gm/dL    A/G Ratio 1.3 g/dL    BUN/Creatinine Ratio 18.5 7.0 - 25.0    Anion Gap 10.7 5.0 - 15.0 mmol/L     eGFR 84.8 >60.0 mL/min/1.73   CBC Auto Differential    Collection Time: 10/07/24 11:57 PM    Specimen: Blood   Result Value Ref Range    WBC 11.16 (H) 3.40 - 10.80 10*3/mm3    RBC 3.97 (L) 4.14 - 5.80 10*6/mm3    Hemoglobin 13.1 13.0 - 17.7 g/dL    Hematocrit 38.9 37.5 - 51.0 %    MCV 98.0 (H) 79.0 - 97.0 fL    MCH 33.0 26.6 - 33.0 pg    MCHC 33.7 31.5 - 35.7 g/dL    RDW 13.5 12.3 - 15.4 %    RDW-SD 47.9 37.0 - 54.0 fl    MPV 10.1 6.0 - 12.0 fL    Platelets 268 140 - 450 10*3/mm3    Neutrophil % 73.3 42.7 - 76.0 %    Lymphocyte % 12.5 (L) 19.6 - 45.3 %    Monocyte % 9.8 5.0 - 12.0 %    Eosinophil % 3.3 0.3 - 6.2 %    Basophil % 0.4 0.0 - 1.5 %    Immature Grans % 0.7 (H) 0.0 - 0.5 %    Neutrophils, Absolute 8.17 (H) 1.70 - 7.00 10*3/mm3    Lymphocytes, Absolute 1.40 0.70 - 3.10 10*3/mm3    Monocytes, Absolute 1.09 (H) 0.10 - 0.90 10*3/mm3    Eosinophils, Absolute 0.37 0.00 - 0.40 10*3/mm3    Basophils, Absolute 0.05 0.00 - 0.20 10*3/mm3    Immature Grans, Absolute 0.08 (H) 0.00 - 0.05 10*3/mm3    nRBC 0.0 0.0 - 0.2 /100 WBC       Ordered the above labs and reviewed the results.        RADIOLOGY  CT Facial Bones Without Contrast    Result Date: 10/8/2024  Patient: ALEN WU  Time Out: 01:47 Exam(s): CT FACIAL Without Contrast EXAM:   CT Head and Maxillofacial Without Intravenous Contrast CLINICAL HISTORY:    Reason for exam: trauma. TECHNIQUE:   Axial computed tomography images of the head brain and face without intravenous contrast.  CTDI is 24.27 mGy and DLP is 491.9 mGy-cm.  This CT exam was performed according to the principle of ALARA (As Low As Reasonably Achievable) by using one or more of the following dose reduction techniques: automated exposure control, adjustment of the mA and or kV according to patient size, and or use of iterative reconstruction technique. COMPARISON:   No relevant prior studies available. FINDINGS:   Bones joints:  Acute nasal bone fracture.  Remaining facial bones are intact    Soft tissues:  Unremarkable.   Sinuses:  No acute sinusitis.   Mastoid air cells:  No mastoid effusion.   Orbits:  Unremarkable. IMPRESSION:       Acute nasal bone fracture.  Remaining facial bones are intact     Electronically signed by Misbah Garcia MD on 10-08-24 at 0147    CT Head Without Contrast    Result Date: 10/8/2024  Patient: ALEN WU  Time Out: 01:46 Exam(s): CT HEAD Without Contrast EXAM:   CT Head Without Intravenous Contrast CLINICAL HISTORY:    Reason for exam: fall with facial injury. TECHNIQUE:   Axial computed tomography images of the head brain without intravenous contrast.  CTDI is 55.7 mGy and DLP is 991.9 mGy-cm.  This CT exam was performed according to the principle of ALARA (As Low As Reasonably Achievable) by using one or more of the following dose reduction techniques: automated exposure control, adjustment of the mA and or kV according to patient size, and or use of iterative reconstruction technique. COMPARISON:   No relevant prior studies available. FINDINGS:   Brain:  No hemorrhage, herniation, or mass effect.  Chronic microvascular ischemic changes.   Ventricles:  No hydrocephalus.  Age related cerebral volume loss.   Bones joints:  Unremarkable.   Soft tissues:  Unremarkable.   Sinuses:  No air fluid levels.   Mastoid air cells:  Clear. IMPRESSION:       No acute hemorrhage, hydrocephalus, or mass effect.     Electronically signed by Misbah Garcia MD on 10-08-24 at 0146    CT Cervical Spine Without Contrast    Result Date: 10/8/2024  Patient: ALEN WU  Time Out: 01:31 Exam(s): CT C SPINE EXAM:   CT Cervical Spine Without Intravenous Contrast CLINICAL HISTORY:    Reason for exam: fall with facial injury and tingling in hands.. TECHNIQUE:   Axial computed tomography images of the cervical spine without intravenous contrast.  CTDI is 17.94 mGy and DLP is 440.4 mGy-cm.  This CT exam was performed according to the principle of ALARA (As Low As Reasonably Achievable) by using  one or more of the following dose reduction techniques: automated exposure control, adjustment of the mA and or kV according to patient size, and or use of iterative reconstruction technique. COMPARISON:   No relevant prior studies available. FINDINGS:   Vertebrae:  No acute fracture.   Discs spinal canal neural foramina:  degenerative changes.   Soft tissues:  No prevertebral swelling. IMPRESSION:       No acute fracture or subluxation.     Electronically signed by Misbah Garcia MD on 10-08-24 at 0131     Ordered the above noted radiological studies. Reviewed by me in PACS.            PROCEDURES  Procedures            MEDICATIONS GIVEN IN ER  Medications - No data to display                MEDICAL DECISION MAKING, PROGRESS, and CONSULTS    All labs have been independently reviewed by me.  All radiology studies have been reviewed by me and I have also reviewed the radiology report.   EKG's independently viewed and interpreted by me.  Discussion below represents my analysis of pertinent findings related to patient's condition, differential diagnosis, treatment plan and final disposition.      Additional sources:  - Discussed/ obtained information from independent historians: History obtained from the patient as well as the patient's family at bedside.    - External (non-ED) record review: Upon medical records review, the patient was admitted from 8/17/2024 through 8/21/2024 where he was treated for gangrenous cholecystitis.    - Chronic or social conditions impacting care: Diabetes mellitus, ambulatory instability    - Shared decision making: Admission decision based on shared conversations have between myself, the patient and family at bedside, as well as LINDA Gannon for [facility].      Orders placed during this visit:  Orders Placed This Encounter   Procedures    CT Head Without Contrast    CT Cervical Spine Without Contrast    CT Facial Bones Without Contrast    Comprehensive Metabolic Panel    CBC Auto  Differential    [facility] (on-call MD unless specified) Details    Inpatient Admission    CBC & Differential           Differential diagnosis includes but is not limited to:    Closed head injury, intracranial hemorrhage, skull fracture, intracranial mass effect, facial bone fracture, facial abrasion, cervical spine strain, or cervical spine fracture      Independent interpretation of labs, radiology studies, and discussions with consultants:    Head CT independently interpreted by myself with my interpretation showing no skull fracture nor area of hemorrhage or mass effect.        ED Course as of 10/08/24 0257   Tue Oct 08, 2024   0218 On reevaluation, the patient is resting comfortably however with attempted ambulation, he became very dizzy and began to fall once again.  I informed them that the CT scans were unremarkable other than a nondisplaced nasal bone fracture.  Given the fact that he cannot ambulate safely at this point, we will admit him to the hospital today for further management and treatment.  They agree with the plan and all questions answered. [BM]   0256 Patient's presentation, workup, as well as diagnosis and treatment plan was discussed at length with LINDA Gannon for [facility].  She agrees to admit the patient to the hospital today for Dr. Echols. [BM]      ED Course User Index  [BM] Gonsalo Denise MD           DIAGNOSIS  Final diagnoses:   Fall from standing, initial encounter   Closed head injury, initial encounter   Closed fracture of nasal bone, initial encounter   Unable to ambulate   Strain of neck muscle, initial encounter   Multiple skin tears         DISPOSITION  ADMISSION    Discussed treatment plan and reason for admission with pt/family and admitting physician.  Pt/family voiced understanding of the plan for admission for further testing/treatment as needed.               Latest Documented Vital Signs:  As of 02:57 EDT  BP- 148/62 HR- 68 Temp- 97.2 °F (36.2 °C) O2 sat- 98%               --    Please note that portions of this were completed with a voice recognition program.       Note Disclaimer: At [facility], we believe that sharing information builds trust and better relationships. You are receiving this note because you are receiving care at [facility] or recently visited. It is possible you will see health information before a provider has talked with you about it. This kind of information can be easy to misunderstand. To help you fully understand what it means for your health, we urge you to discuss this note with your provider.             Gonsalo Denise MD  10/08/24 0258

## 2024-10-08 NOTE — THERAPY EVALUATION
Patient Name: Shaheen Saini  : 1935    MRN: [id number]                              Today's Date: 10/8/2024       Admit Date: 10/7/2024    Visit Dx:     ICD-10-CM ICD-9-CM   1. Fall from standing, initial encounter  W19.XXXA E888.9   2. Closed head injury, initial encounter  S09.90XA 959.01   3. Closed fracture of nasal bone, initial encounter  S02.2XXA 802.0   4. Unable to ambulate  R26.2 719.7   5. Strain of neck muscle, initial encounter  S16.1XXA 847.0   6. Multiple skin tears  T14.8XXA 879.8     Patient Active Problem List   Diagnosis    Diabetes mellitus    Hyperlipidemia    Hypertension    GERD (gastroesophageal reflux disease)    CAD (coronary artery disease)    History of echocardiogram    History of cardiac cath    History of coronary artery bypass surgery    Mild aortic insufficiency    Cellulitis of right lower extremity    Type 2 diabetes mellitus, without long-term current use of insulin    PAF (paroxysmal atrial fibrillation)    Gangrenous cholecystitis    Closed head injury    Fall     Past Medical History:   Diagnosis Date    CAD (coronary artery disease)     Cholelithiasis 2024    Removed Gallbladder also stones    Diabetes mellitus     GERD (gastroesophageal reflux disease)     History of cardiac cath     12 - LVEF 65%. 1+ MV insufficiency. Three vessel CAD. Three of four bypass grafts remain patent. The vein graft to the third marginal branch is a previously documented occlusion. The distal circumflex is unchanged from angiogram 09. 09 - LVEF 65%.Three vessel CAD s/p CABG. One fraft to marginal branch is occluded, the rest of the grafts are patent. The marginal branch is diffusely dis    History of echocardiogram     18 - LA mildly dilated. Normal LV function. Mild MR. Mild-moderate TR. Mild AR. 2012 - EF 60-65%. Moderate MI, TI, and PI. Mild AI.    Hyperlipidemia     Hypertension     Paroxysmal atrial fibrillation      Past Surgical History:   Procedure  Laterality Date    CHOLECYSTECTOMY WITH INTRAOPERATIVE CHOLANGIOGRAM N/A 08/18/2024    Procedure: CHOLECYSTECTOMY LAPAROSCOPIC INTRAOPERATIVE CHOLANGIOGRAM;  Surgeon: Inocencia Vieira MD;  Location:  [facility];  Service: General;  Laterality: N/A;    CORONARY ARTERY BYPASS GRAFT  09/06/2005    CABG x4. Internal mammary artery to LAD, vein graft to diagonal artery, vein graft to OM, vein graft to posterior descending artery.    EYE SURGERY      Cataracts removed      General Information       Row Name 10/08/24 [facility]          Physical Therapy Time and Intention    Document Type evaluation  -     Mode of Treatment individual therapy;physical therapy  -       Row Name 10/08/24 1711          General Information    Patient Profile Reviewed yes  -     Prior Level of Function independent:;gait;transfer;bed mobility  -     Existing Precautions/Restrictions fall  -     Barriers to Rehab none identified  -       Row Name 10/08/24 1711          Living Environment    People in Home child(wei), adult  -       Row Name 10/08/24 1711          Home Main Entrance    Number of Stairs, Main Entrance two  -       Row Name 10/08/24 1711          Stairs Within Home, Primary    Number of Stairs, Within Home, Primary none  -       Row Name 10/08/24 1711          Cognition    Orientation Status (Cognition) oriented x 3  -       Row Name 10/08/24 1711          Safety Issues, Functional Mobility    Impairments Affecting Function (Mobility) balance;endurance/activity tolerance;strength;pain;range of motion (ROM)  -               User Key  (r) = Recorded By, (t) = Taken By, (c) = Cosigned By      Initials Name Provider Type     Noelle Colbert PT Physical Therapist                   Mobility       Row Name 10/08/24 1711          Bed Mobility    Bed Mobility supine-sit  -     Supine-Sit [place] (Bed Mobility) standby assist;verbal cues  -     Assistive Device (Bed Mobility) head of bed elevated;bed rails   -[facility] Name 10/08/24 1711          Sit-Stand Transfer    Sit-Stand [place] (Transfers) minimum assist (75% patient effort);1 person assist;verbal cues  -     Assistive Device (Sit-Stand Transfers) walker, front-wheeled  -[facility] Name 10/08/24 1711          Gait/Stairs (Locomotion)    [place] Level (Gait) minimum assist (75% patient effort);1 person assist;verbal cues  -     Assistive Device (Gait) walker, front-wheeled  [facility]     Distance in Feet (Gait) 60  -     Deviations/Abnormal Patterns (Gait) antalgic;santhosh decreased;gait speed decreased;stride length decreased  -     Bilateral Gait Deviations forward flexed posture  -               User Key  (r) = Recorded By, (t) = Taken By, (c) = Cosigned By      Initials Name Provider Type     Noelle Colbert, PT Physical Therapist                   Obj/Interventions       [facility] Name 10/08/24 1711          Range of Motion Comprehensive    General Range of Motion bilateral lower extremity ROM WFL  -[facility] Name 10/08/24 1711          Strength Comprehensive (MMT)    General Manual Muscle Testing (MMT) Assessment lower extremity strength deficits identified  -     Comment, General Manual Muscle Testing (MMT) Assessment Generalized weakness, BLE Grossly 4/5  -[facility] Name 10/08/24 1711          Motor Skills    Therapeutic Exercise --  5 reps B AP/LAQ/seated marches  -[facility] Name 10/08/24 1711          Balance    Balance Assessment sitting static balance;sitting dynamic balance;standing static balance;standing dynamic balance  -     Static Sitting Balance supervision  -     Dynamic Sitting Balance standby assist  -     Position, Sitting Balance unsupported;sitting edge of bed  -     Static Standing Balance contact guard;verbal cues  -     Dynamic Standing Balance contact guard;minimal assist;verbal cues  -     Position/Device Used, Standing Balance supported;walker, front-wheeled  -     Balance Interventions  sitting;standing;sit to stand;supported;static;dynamic  -       Row Name 10/08/24 1711          Sensory Assessment (Somatosensory)    Sensory Assessment (Somatosensory) LE sensation intact  -               User Key  (r) = Recorded By, (t) = Taken By, (c) = Cosigned By      Initials Name Provider Type     Noelle Colbert, PT Physical Therapist                   Goals/Plan       Row Name 10/08/24 1719          Bed Mobility Goal 1 (PT)    Activity/Assistive Device (Bed Mobility Goal 1, PT) bed mobility activities, all  -     [place] Level/Cues Needed (Bed Mobility Goal 1, PT) supervision required  -     Time Frame (Bed Mobility Goal 1, PT) 1 week  -[facility] Name 10/08/24 1719          Transfer Goal 1 (PT)    Activity/Assistive Device (Transfer Goal 1, PT) transfers, all  -     [place] Level/Cues Needed (Transfer Goal 1, PT) standby assist  -     Time Frame (Transfer Goal 1, PT) 1 week  -[facility] Name 10/08/24 1719          Gait Training Goal 1 (PT)    Activity/Assistive Device (Gait Training Goal 1, PT) gait (walking locomotion)  -     [place] Level (Gait Training Goal 1, PT) standby assist  -     Distance (Gait Training Goal 1, PT) 150ft  -     Time Frame (Gait Training Goal 1, PT) 1 week  -[facility] Name 10/08/24 1719          Therapy Assessment/Plan (PT)    Planned Therapy Interventions (PT) balance training;bed mobility training;gait training;home exercise program;patient/family education;strengthening;ROM (range of motion);stair training;stretching;transfer training  -               User Key  (r) = Recorded By, (t) = Taken By, (c) = Cosigned By      Initials Name Provider Type     Noelle Colbert, PT Physical Therapist                   Clinical Impression       Row Name 10/08/24 1712          Pain    Pretreatment Pain Rating 0/10 - no pain  -     Posttreatment Pain Rating 0/10 - no pain  -[facility] Name 10/08/24 1712          Plan of Care Review    Plan of  Care Reviewed With patient  -     Outcome Evaluation Pt is an 88 yo M admitted from home after a fall. Pt typically independent with a cane or walker and tried to walk without resulting in a fall. Imaging revealed non-displaced nasal bone fx, but no other fxs, though pt does have multiple skin tears. Pt lives with his daughter and denies any other recent falls, states he is current with HHPT but he was supposed to have his last visit this week. Pt presents to PT with impaired strength, endurance, and balance limiting overall mobility. Pt transferred to B with CGA and stood with min A x1. Attempted ambulation wiht HHA x1 to mimic use of cane at home, but pt very unsteady and unable to safely progress mobility away from bed. Pt given rwx with significant improvement, ambulating 60ft with min A x1 d/t difficulty with rwx navigation. Pt states he is used to a rollator - will plan to trial rollator tomorrow to assess overall safety but pt is not safe to be using a cane at home currently - recommend rwx and Ax1 with nsg for mobility while admitted. Pt left sitting UI on alarm with needs met, RN present. PT will continue to follow, anticipate DC home with continued HHPT and 24/7 care.  -       Row Name 10/08/24 2482          Therapy Assessment/Plan (PT)    Patient/Family Therapy Goals Statement (PT) Return to OF  -     Rehab Potential (PT) good, to achieve stated therapy goals  -     Criteria for Skilled Interventions Met (PT) yes  -     Therapy Frequency (PT) 5 times/wk  -       Row Name 10/08/24 1712          Vital Signs    O2 Delivery Pre Treatment room air  -     O2 Delivery Intra Treatment room air  -     O2 Delivery Post Treatment room air  -       Row Name 10/08/24 1712          Positioning and Restraints    Pre-Treatment Position in bed  -     Post Treatment Position chair  -     In Chair notified nsg;reclined;call light within reach;encouraged to call for assist;exit alarm on  -                User Key  (r) = Recorded By, (t) = Taken By, (c) = Cosigned By      Initials Name Provider Type    Noelle Omer PT Physical Therapist                   Outcome Measures       Row Name 10/08/24 1719 10/08/24 0756       How much help from another person do you currently need...    Turning from your back to your side while in flat bed without using bedrails? 3  - 3  -CW    Moving from lying on back to sitting on the side of a flat bed without bedrails? 3  - 2  -CW    Moving to and from a bed to a chair (including a wheelchair)? 3  - 2  -CW    Standing up from a chair using your arms (e.g., wheelchair, bedside chair)? 3  - 2  -CW    Climbing 3-5 steps with a railing? 2  - 2  -CW    To walk in hospital room? 3  - 2  -CW    AM-PAC 6 Clicks Score (PT) 17  - 13  -CW    Highest Level of Mobility Goal 5 --> Static standing  - 4 --> Transfer to chair/commode  -CW      Row Name 10/08/24 1719          Functional Assessment    Outcome Measure Options AM-PAC 6 Clicks Basic Mobility (PT)  -               User Key  (r) = Recorded By, (t) = Taken By, (c) = Cosigned By      Initials Name Provider Type    Janice Calvo, RN Registered Nurse     Noelle Colbert PT Physical Therapist                                 Physical Therapy Education       Title: PT OT SLP Therapies (In Progress)       Topic: Physical Therapy (In Progress)       Point: Mobility training (Done)       Learning Progress Summary             Patient Acceptance, E,TB,D, VU,NR by  at 10/8/2024 1720                         Point: Home exercise program (Not Started)       Learner Progress:  Not documented in this visit.              Point: Body mechanics (Done)       Learning Progress Summary             Patient Acceptance, E,TB,D, VU,NR by  at 10/8/2024 1720                         Point: Precautions (Done)       Learning Progress Summary             Patient Acceptance, E,TB,D, VU,NR by  at 10/8/2024 1720                                          User Key       Initials Effective Dates Name Provider Type Discipline     04/08/22 -  Noelle Colbert, PT Physical Therapist PT                  PT Recommendation and Plan  Planned Therapy Interventions (PT): balance training, bed mobility training, gait training, home exercise program, patient/family education, strengthening, ROM (range of motion), stair training, stretching, transfer training  Plan of Care Reviewed With: patient  Outcome Evaluation: Pt is an 89 yo M admitted from home after a fall. Pt typically independent with a cane or walker and tried to walk without resulting in a fall. Imaging revealed non-displaced nasal bone fx, but no other fxs, though pt does have multiple skin tears. Pt lives with his daughter and denies any other recent falls, states he is current with HHPT but he was supposed to have his last visit this week. Pt presents to PT with impaired strength, endurance, and balance limiting overall mobility. Pt transferred to EOB with CGA and stood with min A x1. Attempted ambulation wiht HHA x1 to mimic use of cane at home, but pt very unsteady and unable to safely progress mobility away from bed. Pt given rwx with significant improvement, ambulating 60ft with min A x1 d/t difficulty with rwx navigation. Pt states he is used to a rollator - will plan to trial rollator tomorrow to assess overall safety but pt is not safe to be using a cane at home currently - recommend rwx and Ax1 with nsg for mobility while admitted. Pt left sitting UIC on alarm with needs met, RN present. PT will continue to follow, anticipate DC home with continued HHPT and 24/7 care.     Time Calculation:   PT Evaluation Complexity  History, PT Evaluation Complexity: 1-2 personal factors and/or comorbidities  Examination of Body Systems (PT Eval Complexity): total of 3 or more elements  Clinical Presentation (PT Evaluation Complexity): stable  Clinical Decision Making (PT Evaluation Complexity):  low complexity  Overall Complexity (PT Evaluation Complexity): low complexity     PT Charges       Row Name 10/08/24 1720             Time Calculation    Start Time 1439  -      Stop Time 1456  -      Time Calculation (min) 17 min  -      PT Received On 10/08/24  -      PT - Next Appointment 10/09/24  -      PT Goal Re-Cert Due Date 10/15/24  -         Time Calculation- PT    Total Timed Code Minutes- PT 15 minute(s)  -         Timed Charges    60587 - Gait Training Minutes  8  -      73112 - PT Therapeutic Activity Minutes 7  -         Total Minutes    Timed Charges Total Minutes 15  -       Total Minutes 15  -                User Key  (r) = Recorded By, (t) = Taken By, (c) = Cosigned By      Initials Name Provider Type     Noelle Colbert, PT Physical Therapist                  Therapy Charges for Today       Code Description Service Date Service Provider Modifiers Qty    59371588479 HC GAIT TRAINING EA 15 MIN 10/8/2024 Noelle Colbert, PT GP 1    60226714968 HC PT EVAL LOW COMPLEXITY 3 10/8/2024 Noelle Colbert, PT GP 1            PT G-Codes  Outcome Measure Options: AM-PAC 6 Clicks Basic Mobility (PT)  AM-PAC 6 Clicks Score (PT): 17  PT Discharge Summary  Anticipated Discharge Disposition (PT): home with 24/7 care, home with home health    Noelle Colbert, PT  10/8/2024

## 2024-10-08 NOTE — PLAN OF CARE
Pt. VS WNL. C/o  bilateral hands/forearms left (has bruise) weaker than right and has pain/numbness/weakness at times, relieved by PO pain meds. Pt. A&O x4. Pt. Up with assist x1 with walker and gait belt, gait unsteady. Pt. With adequate UOP. Pt. Weak, unable to sit up on own at times, ENDY consulted, MRI ordered. Pt. Refusing MRI at this time, states he is claustrophobic and anxious, this RN educated that RN could reach out to MD for premeds, pt. Still declines MRI, ENDY to see. Pt. Working with PT. Pt. Being transferred to tele, report called to RN. Pt. Resting comfortably at present, will continue to monitor closely for the remainder of this RN's shift.      Problem: Adult Inpatient Plan of Care  Goal: Plan of Care Review  Outcome: Progressing  Flowsheets (Taken 10/8/2024 3024)  Progress: no change  Plan of Care Reviewed With: patient

## 2024-10-08 NOTE — DISCHARGE PLACEMENT REQUEST
"Shaheen Saini (89 y.o. Male)       Date of Birth   1935    Social Security Number       Address   [address] [place], [place] [address]    Home Phone   [phone]    MRN   [id number]       Mormonism   Unknown    Marital Status                               Admission Date   10/7/24    Admission Type   Emergency    Admitting Provider   Ryan Moreland MD    Attending Provider   Ryan Moreland MD    Department, Room/Bed   [address], P892/1       Discharge Date       Discharge Disposition       Discharge Destination                                 Attending Provider: Ryan Moreland MD    Allergies: No Known Allergies    Isolation: None   Infection: None   Code Status: CPR    Ht: 177.8 cm (70\")   Wt: 91.6 kg (202 lb)    Admission Cmt: None   Principal Problem: Closed head injury [S09.90XA]                   Active Insurance as of 10/7/2024       Primary Coverage       Payor Plan Insurance Group Employer/Plan Group    MEDICARE MEDICARE A & B        Payor Plan Address Payor Plan Phone Number Payor Plan Fax Number Effective Dates    [address] [phone]  7/1/2001 - None Entered    [facility] [id number]         Subscriber Name Subscriber Birth Date Member ID       SHAHEEN SAINI 1935 [id number]               Secondary Coverage       Payor Plan Insurance Group Employer/Plan Group    [id number]       Payor Plan Address Payor Plan Phone Number Payor Plan Fax Number Effective Dates    [address]   12/1/2016 - None Entered    [facility] [id number]         Subscriber Name Subscriber Birth Date Member ID       SHAHEEN SAINI 1935 [id number]                     Emergency Contacts        (Rel.) Home Phone Work Phone Mobile Phone    DIRK SAINI (Daughter) [phone] -- [phone]    JAZMIN DANNY CONTRERAS (Son) -- [phone]              "

## 2024-10-08 NOTE — PLAN OF CARE
Goal Outcome Evaluation:  Plan of Care Reviewed With: patient           Outcome Evaluation: Pt is an 88 yo M admitted from home after a fall. Pt typically independent with a cane or walker and tried to walk without resulting in a fall. Imaging revealed non-displaced nasal bone fx, but no other fxs, though pt does have multiple skin tears. Pt lives with his daughter and denies any other recent falls, states he is current with HHPT but he was supposed to have his last visit this week. Pt presents to PT with impaired strength, endurance, and balance limiting overall mobility. Pt transferred to EOB with CGA and stood with min A x1. Attempted ambulation wiht HHA x1 to mimic use of cane at home, but pt very unsteady and unable to safely progress mobility away from bed. Pt given rwx with significant improvement, ambulating 60ft with min A x1 d/t difficulty with rwx navigation. Pt states he is used to a rollator - will plan to trial rollator tomorrow to assess overall safety but pt is not safe to be using a cane at home currently - recommend rwx and Ax1 with nsg for mobility while admitted. Pt left sitting UIC on alarm with needs met, RN present. PT will continue to follow, anticipate DC home with continued HHPT and 24/7 care.      Anticipated Discharge Disposition (PT): home with 24/7 care, home with home health

## 2024-10-09 PROBLEM — M48.02 CERVICAL STENOSIS OF SPINAL CANAL: Status: ACTIVE | Noted: 2024-10-09

## 2024-10-09 PROBLEM — R20.2 NUMBNESS AND TINGLING OF BOTH UPPER EXTREMITIES: Status: ACTIVE | Noted: 2024-10-09

## 2024-10-09 PROBLEM — S09.90XA CLOSED HEAD INJURY: Status: RESOLVED | Noted: 2024-10-08 | Resolved: 2024-10-09

## 2024-10-09 PROBLEM — R20.0 NUMBNESS AND TINGLING OF BOTH UPPER EXTREMITIES: Status: ACTIVE | Noted: 2024-10-09

## 2024-10-09 PROBLEM — S19.80XA: Status: ACTIVE | Noted: 2024-10-09

## 2024-10-09 LAB
ANION GAP SERPL CALCULATED.3IONS-SCNC: 7 MMOL/L (ref 5–15)
ANION GAP SERPL CALCULATED.3IONS-SCNC: 8 MMOL/L (ref 5–15)
BACTERIA UR QL AUTO: ABNORMAL /HPF
BILIRUB UR QL STRIP: NEGATIVE
BUN SERPL-MCNC: 16 MG/DL (ref 8–23)
BUN SERPL-MCNC: 16 MG/DL (ref 8–23)
BUN/CREAT SERPL: 19 (ref 7–25)
BUN/CREAT SERPL: 20.8 (ref 7–25)
CALCIUM SPEC-SCNC: 8.5 MG/DL (ref 8.6–10.5)
CALCIUM SPEC-SCNC: 8.6 MG/DL (ref 8.6–10.5)
CHLORIDE SERPL-SCNC: 94 MMOL/L (ref 98–107)
CHLORIDE SERPL-SCNC: 95 MMOL/L (ref 98–107)
CLARITY UR: CLEAR
CO2 SERPL-SCNC: 23 MMOL/L (ref 22–29)
CO2 SERPL-SCNC: 24 MMOL/L (ref 22–29)
COLOR UR: YELLOW
CREAT SERPL-MCNC: 0.77 MG/DL (ref 0.76–1.27)
CREAT SERPL-MCNC: 0.84 MG/DL (ref 0.76–1.27)
DEPRECATED RDW RBC AUTO: 46.4 FL (ref 37–54)
EGFRCR SERPLBLD CKD-EPI 2021: 83.9 ML/MIN/1.73
EGFRCR SERPLBLD CKD-EPI 2021: 86.1 ML/MIN/1.73
ERYTHROCYTE [DISTWIDTH] IN BLOOD BY AUTOMATED COUNT: 13.4 % (ref 12.3–15.4)
GLUCOSE BLDC GLUCOMTR-MCNC: 119 MG/DL (ref 70–130)
GLUCOSE BLDC GLUCOMTR-MCNC: 120 MG/DL (ref 70–130)
GLUCOSE BLDC GLUCOMTR-MCNC: 164 MG/DL (ref 70–130)
GLUCOSE BLDC GLUCOMTR-MCNC: 179 MG/DL (ref 70–130)
GLUCOSE SERPL-MCNC: 116 MG/DL (ref 65–99)
GLUCOSE SERPL-MCNC: 153 MG/DL (ref 65–99)
GLUCOSE UR STRIP-MCNC: ABNORMAL MG/DL
HCT VFR BLD AUTO: 35.4 % (ref 37.5–51)
HGB BLD-MCNC: 12.1 G/DL (ref 13–17.7)
HGB UR QL STRIP.AUTO: ABNORMAL
HYALINE CASTS UR QL AUTO: ABNORMAL /LPF
KETONES UR QL STRIP: NEGATIVE
LEUKOCYTE ESTERASE UR QL STRIP.AUTO: ABNORMAL
MCH RBC QN AUTO: 32.4 PG (ref 26.6–33)
MCHC RBC AUTO-ENTMCNC: 34.2 G/DL (ref 31.5–35.7)
MCV RBC AUTO: 94.7 FL (ref 79–97)
NITRITE UR QL STRIP: NEGATIVE
OSMOLALITY UR: 478 MOSM/KG
PH UR STRIP.AUTO: 5.5 [PH] (ref 5–8)
PLATELET # BLD AUTO: 252 10*3/MM3 (ref 140–450)
PMV BLD AUTO: 10.1 FL (ref 6–12)
POTASSIUM SERPL-SCNC: 3.8 MMOL/L (ref 3.5–5.2)
POTASSIUM SERPL-SCNC: 4.2 MMOL/L (ref 3.5–5.2)
PROT UR QL STRIP: ABNORMAL
RBC # BLD AUTO: 3.74 10*6/MM3 (ref 4.14–5.8)
RBC # UR STRIP: ABNORMAL /HPF
REF LAB TEST METHOD: ABNORMAL
SODIUM SERPL-SCNC: 125 MMOL/L (ref 136–145)
SODIUM SERPL-SCNC: 126 MMOL/L (ref 136–145)
SODIUM UR-SCNC: 34 MMOL/L
SP GR UR STRIP: 1.02 (ref 1–1.03)
SQUAMOUS #/AREA URNS HPF: ABNORMAL /HPF
UROBILINOGEN UR QL STRIP: ABNORMAL
WBC # UR STRIP: ABNORMAL /HPF
WBC NRBC COR # BLD AUTO: 12.71 10*3/MM3 (ref 3.4–10.8)

## 2024-10-09 PROCEDURE — 81001 URINALYSIS AUTO W/SCOPE: CPT | Performed by: STUDENT IN AN ORGANIZED HEALTH CARE EDUCATION/TRAINING PROGRAM

## 2024-10-09 PROCEDURE — 80048 BASIC METABOLIC PNL TOTAL CA: CPT | Performed by: STUDENT IN AN ORGANIZED HEALTH CARE EDUCATION/TRAINING PROGRAM

## 2024-10-09 PROCEDURE — 84300 ASSAY OF URINE SODIUM: CPT | Performed by: STUDENT IN AN ORGANIZED HEALTH CARE EDUCATION/TRAINING PROGRAM

## 2024-10-09 PROCEDURE — 97535 SELF CARE MNGMENT TRAINING: CPT

## 2024-10-09 PROCEDURE — 85027 COMPLETE CBC AUTOMATED: CPT | Performed by: NURSE PRACTITIONER

## 2024-10-09 PROCEDURE — 63710000001 INSULIN LISPRO (HUMAN) PER 5 UNITS: Performed by: NURSE PRACTITIONER

## 2024-10-09 PROCEDURE — 97530 THERAPEUTIC ACTIVITIES: CPT

## 2024-10-09 PROCEDURE — 80048 BASIC METABOLIC PNL TOTAL CA: CPT | Performed by: NURSE PRACTITIONER

## 2024-10-09 PROCEDURE — 99221 1ST HOSP IP/OBS SF/LOW 40: CPT | Performed by: NURSE PRACTITIONER

## 2024-10-09 PROCEDURE — 82948 REAGENT STRIP/BLOOD GLUCOSE: CPT

## 2024-10-09 PROCEDURE — 83935 ASSAY OF URINE OSMOLALITY: CPT | Performed by: STUDENT IN AN ORGANIZED HEALTH CARE EDUCATION/TRAINING PROGRAM

## 2024-10-09 RX ADMIN — CETIRIZINE HYDROCHLORIDE 5 MG: 10 TABLET ORAL at 22:30

## 2024-10-09 RX ADMIN — DICLOFENAC SODIUM 2 G: 10 GEL TOPICAL at 22:30

## 2024-10-09 RX ADMIN — Medication 10 ML: at 08:39

## 2024-10-09 RX ADMIN — RANOLAZINE 500 MG: 500 TABLET, FILM COATED, EXTENDED RELEASE ORAL at 22:30

## 2024-10-09 RX ADMIN — INSULIN LISPRO 2 UNITS: 100 INJECTION, SOLUTION INTRAVENOUS; SUBCUTANEOUS at 22:30

## 2024-10-09 RX ADMIN — INSULIN LISPRO 2 UNITS: 100 INJECTION, SOLUTION INTRAVENOUS; SUBCUTANEOUS at 12:08

## 2024-10-09 RX ADMIN — ASPIRIN 81 MG: 81 TABLET, COATED ORAL at 08:38

## 2024-10-09 RX ADMIN — PANTOPRAZOLE SODIUM 40 MG: 40 TABLET, DELAYED RELEASE ORAL at 06:02

## 2024-10-09 RX ADMIN — CETIRIZINE HYDROCHLORIDE 5 MG: 10 TABLET ORAL at 08:38

## 2024-10-09 RX ADMIN — PAROXETINE HYDROCHLORIDE 10 MG: 10 TABLET, FILM COATED ORAL at 06:02

## 2024-10-09 RX ADMIN — ACETAMINOPHEN 325MG 650 MG: 325 TABLET ORAL at 22:30

## 2024-10-09 RX ADMIN — ATENOLOL 12.5 MG: 25 TABLET ORAL at 08:39

## 2024-10-09 RX ADMIN — RANOLAZINE 500 MG: 500 TABLET, FILM COATED, EXTENDED RELEASE ORAL at 08:39

## 2024-10-09 RX ADMIN — ATORVASTATIN CALCIUM 20 MG: 20 TABLET, FILM COATED ORAL at 08:38

## 2024-10-09 RX ADMIN — DICLOFENAC SODIUM 2 G: 10 GEL TOPICAL at 08:38

## 2024-10-09 NOTE — PLAN OF CARE
"Goal Outcome Evaluation:  Plan of Care Reviewed With: patient           Outcome Evaluation: Denies pain or dizziness. Up with one assist and walker. Unsteady on feet. Patient cautious about possible having his right knee \"buckle\". Worked with PT?OT. Walked to BR and in the morris. Blood sugars noted. Room air. Telemetry SR.  Sodium level low and spoke about his oral intake of water. Continues to refuse MRI.                               "

## 2024-10-09 NOTE — PROGRESS NOTES
Name: Shaheen Saini ADMIT: 10/7/2024   : 1935  PCP: Pal Lawrence MD    MRN: [id number] LOS: 1 days   AGE/SEX: 89 y.o. male  ROOM: Replaced by [facility]/     Subjective   Subjective   Resting in bed.  Feels his weakness is a little better today.  No other acute problems.    Objective   Objective   Vital Signs  Temp:  [97.6 °F (36.4 °C)-99 °F (37.2 °C)] 99 °F (37.2 °C)  Heart Rate:  [] 104  Resp:  [18-20] 18  BP: (136-158)/(73-85) 136/85  SpO2:  [93 %-94 %] 94 %  on   ;   Device (Oxygen Therapy): room air  Body mass index is 29.01 kg/m².  Physical Exam  Constitutional:       Appearance: He is ill-appearing.      Comments: Hard of hearing   HENT:      Head:      Comments: Swelling and bruising to nose, orbits, forehead  Pulmonary:      Effort: Pulmonary effort is normal. No respiratory distress.      Breath sounds: No stridor.   Skin:     Coloration: Skin is not pale.   Neurological:      Mental Status: He is alert and oriented to person, place, and time.         Results Review     I reviewed the patient's new clinical results.  Results from last 7 days   Lab Units 10/09/24  0438 10/08/24  0624 10/07/24  2357   WBC 10*3/mm3 12.71* 8.68 11.16*   HEMOGLOBIN g/dL 12.1* 12.4* 13.1   PLATELETS 10*3/mm3 252 260 268     Results from last 7 days   Lab Units 10/09/24  0438 10/08/24  0624 10/07/24  2357   SODIUM mmol/L 125* 130* 130*   POTASSIUM mmol/L 3.8 4.1 4.2   CHLORIDE mmol/L 94* 98 97*   CO2 mmol/L 24.0 24.0 22.3   BUN mg/dL 16 13 15   CREATININE mg/dL 0.84 0.65* 0.81   GLUCOSE mg/dL 116* 180* 210*   EGFR mL/min/1.73 83.9 90.6 84.8     Results from last 7 days   Lab Units 10/08/24  0624 10/07/24  2357   ALBUMIN g/dL 3.6 3.6   BILIRUBIN mg/dL 0.4 0.4   ALK PHOS U/L 80 76   AST (SGOT) U/L 23 17   ALT (SGPT) U/L 21 20     Results from last 7 days   Lab Units 10/09/24  0438 10/08/24  0624 10/07/24  2357   CALCIUM mg/dL 8.6 9.1 8.9   ALBUMIN g/dL  --  3.6 3.6       Glucose   Date/Time Value Ref Range Status    10/09/2024 1051 179 (H) 70 - 130 mg/dL Final   10/09/2024 0602 120 70 - 130 mg/dL Final   10/08/2024 2253 136 (H) 70 - 130 mg/dL Final   10/08/2024 1633 101 70 - 130 mg/dL Final   10/08/2024 1127 210 (H) 70 - 130 mg/dL Final   10/08/2024 0827 163 (H) 70 - 130 mg/dL Final       CT Facial Bones Without Contrast    Result Date: 10/8/2024  Electronically signed by Misbah Garcia MD on 10-08-24 at 0147    CT Head Without Contrast    Result Date: 10/8/2024  Electronically signed by Misbah Garcia MD on 10-08-24 at 0146    CT Cervical Spine Without Contrast    Result Date: 10/8/2024  Electronically signed by Misbah Garcia MD on 10-08-24 at 0131   Scheduled Medications  aspirin, 81 mg, Oral, Daily  atenolol, 12.5 mg, Oral, Daily  atorvastatin, 20 mg, Oral, Daily  cetirizine, 5 mg, Oral, BID  Diclofenac Sodium, 2 g, Topical, BID  insulin lispro, 2-7 Units, Subcutaneous, 4x Daily AC & at Bedtime  pantoprazole, 40 mg, Oral, Q AM  [Held by provider] PARoxetine, 10 mg, Oral, QAM  ranolazine, 500 mg, Oral, BID    Infusions   Diet  Diet: Diabetic, Cardiac; Healthy Heart (2-3 Na+); Consistent Carbohydrate; Fluid Consistency: Thin (IDDSI 0)       Assessment/Plan     Active Hospital Problems    Diagnosis  POA    Hyperextension injury of cervical spine, initial encounter [S19.80XA]  Yes    Numbness and tingling of both upper extremities [R20.0, R20.2]  Yes    Cervical stenosis of spinal canal [M48.02]  Yes    Fall [W19.XXXA]  Yes    Hyponatremia [E87.1]  Yes    Type 2 diabetes mellitus, without long-term current use of insulin [E11.9]  Yes    PAF (paroxysmal atrial fibrillation) [I48.0]  Yes    History of coronary artery bypass surgery [Z95.1]  Not Applicable    Hyperlipidemia [E78.5]  Yes    Hypertension [I10]  Yes    GERD (gastroesophageal reflux disease) [K21.9]  Yes    CAD (coronary artery disease) [I25.10]  Yes      Resolved Hospital Problems    Diagnosis Date Resolved POA    **Closed head injury [S09.90XA] 10/09/2024 Yes        89 y.o. male admitted with Closed head injury.      10/09/24  No MRI desired by patient.  Workup for hyponatremia.  Continue PT/OT    Nasal bone fracture and facial injuries status post fall  UE weakness  CT head negative. CT facial bones noting nasal fracture. No indication of interference with his airway.  Pain control.  Consult PT/OT.   -ENDY consulted; patient declined MRI due to claustrophobia.  His weakness has improved.  Etiology would likely be a cord contusion given he has significant degeneration on prior imaging.  He does not desire any surgical management so deferring MRI is reasonable.  Continue with therapies.    Hyponatremia  -Na 125  -recheck BMP, send UA  -hold Paxil; consider switching to Remeron- lower SIADH risk    PAF  HTN  CAD  HLD  RC: atenolol 12.5 daily   AC: none 2/2 fall risk  ASA, statin, Ranexa    DM2  Hold home regimen and cover with lispro low-dose SSI.  Accu-Cheks before meals and at bedtime.      Anxiety  Oxazepam PRN qhs; not ideal medicine for elderly gentleman with fall risk         DVT prophylaxis: SCDs  Discussed with patient, family, and care team on multidisciplinary rounds.  Anticipated discharge home with HH, 1-2 days            Ryan Moreland MD  [facility]  10/09/24  11:43 EDT

## 2024-10-09 NOTE — THERAPY EVALUATION
Patient Name: Shaheen Saini  : 1935    MRN: [id number]                              Today's Date: 10/9/2024       Admit Date: 10/7/2024    Visit Dx:     ICD-10-CM ICD-9-CM   1. Fall from standing, initial encounter  W19.XXXA E888.9   2. Closed head injury, initial encounter  S09.90XA 959.01   3. Closed fracture of nasal bone, initial encounter  S02.2XXA 802.0   4. Unable to ambulate  R26.2 719.7   5. Strain of neck muscle, initial encounter  S16.1XXA 847.0   6. Multiple skin tears  T14.8XXA 879.8     Patient Active Problem List   Diagnosis    Diabetes mellitus    Hyperlipidemia    Hypertension    GERD (gastroesophageal reflux disease)    CAD (coronary artery disease)    History of echocardiogram    History of cardiac cath    History of coronary artery bypass surgery    Mild aortic insufficiency    Cellulitis of right lower extremity    Type 2 diabetes mellitus, without long-term current use of insulin    PAF (paroxysmal atrial fibrillation)    Gangrenous cholecystitis    Hyponatremia    Fall    Hyperextension injury of cervical spine, initial encounter    Numbness and tingling of both upper extremities    Cervical stenosis of spinal canal     Past Medical History:   Diagnosis Date    CAD (coronary artery disease)     Cholelithiasis 2024    Removed Gallbladder also stones    Diabetes mellitus     GERD (gastroesophageal reflux disease)     History of cardiac cath     12 - LVEF 65%. 1+ MV insufficiency. Three vessel CAD. Three of four bypass grafts remain patent. The vein graft to the third marginal branch is a previously documented occlusion. The distal circumflex is unchanged from angiogram 09. 09 - LVEF 65%.Three vessel CAD s/p CABG. One fraft to marginal branch is occluded, the rest of the grafts are patent. The marginal branch is diffusely dis    History of echocardiogram     18 - LA mildly dilated. Normal LV function. Mild MR. Mild-moderate TR. Mild AR. 2012 - EF 60-65%.  Moderate MI, TI, and PI. Mild AI.    Hyperlipidemia     Hypertension     Paroxysmal atrial fibrillation      Past Surgical History:   Procedure Laterality Date    CHOLECYSTECTOMY WITH INTRAOPERATIVE CHOLANGIOGRAM N/A 08/18/2024    Procedure: CHOLECYSTECTOMY LAPAROSCOPIC INTRAOPERATIVE CHOLANGIOGRAM;  Surgeon: Inocencia Vieira MD;  Location: [facility];  Service: General;  Laterality: N/A;    CORONARY ARTERY BYPASS GRAFT  09/06/2005    CABG x4. Internal mammary artery to LAD, vein graft to diagonal artery, vein graft to OM, vein graft to posterior descending artery.    EYE SURGERY      Cataracts removed      General Information       Row Name 10/09/24 1420          OT Time and Intention    Document Type evaluation  -CE     Mode of Treatment occupational therapy  -CE       Row Name 10/09/24 1420          General Information    Patient Profile Reviewed yes  -CE     Prior Level of Function independent:;ADL's;transfer  was getting HHPT and HHOT; using walker prn and cane; also has BSC and grab bars in bathroom  -CE     Existing Precautions/Restrictions fall  -CE       Row Name 10/09/24 1420          Living Environment    People in Home child(wei), adult  -CE       Row Name 10/09/24 1420          Home Main Entrance    Number of Stairs, Main Entrance two  -CE     Stair Railings, Main Entrance railings safe and in good condition  -CE       Row Name 10/09/24 1420          Stairs Within Home, Primary    Number of Stairs, Within Home, Primary none  -CE       Row Name 10/09/24 1420          Cognition    Orientation Status (Cognition) oriented to;person;place  grossly to month  -CE       Row Name 10/09/24 1420          Safety Issues, Functional Mobility    Impairments Affecting Function (Mobility) balance;endurance/activity tolerance;strength;pain;range of motion (ROM);cognition  -CE     Cognitive Impairments, Mobility Safety/Performance insight into deficits/self-awareness;problem-solving/reasoning;judgment;safety  "precaution follow-through  -CE               User Key  (r) = Recorded By, (t) = Taken By, (c) = Cosigned By      Initials Name Provider Type    CE Stacy Calvin OT Occupational Therapist                     Mobility/ADL's       Row Name 10/09/24 1421          Bed Mobility    Supine-Sit [place] (Bed Mobility) standby assist;1 person assist  -CE     Assistive Device (Bed Mobility) head of bed elevated  -CE       Row Name 10/09/24 1421          Transfers    Transfers sit-stand transfer;stand-sit transfer  -CE       Row Name 10/09/24 1421          Sit-Stand Transfer    Sit-Stand Eddy (Transfers) minimum assist (75% patient effort);1 person assist;verbal cues  -CE     Assistive Device (Sit-Stand Transfers) walker, front-wheeled  -CE     Comment, (Sit-Stand Transfer) cues for hand placement  -CE       Row Name 10/09/24 1421          Stand-Sit Transfer    Stand-Sit Eddy (Transfers) 1 person assist;contact guard  -CE     Assistive Device (Stand-Sit Transfers) walker, front-wheeled  -CE       Row Name 10/09/24 1421          Functional Mobility    Functional Mobility- Comment Pt able to ambulate to/from bathroom sink as well as around foot of bed to get to recliner. All mobility with min-CGA x 1 using FWW  -CE       Row Name 10/09/24 1421          Activities of Daily Living    BADL Assessment/Intervention lower body dressing;grooming;toileting  -CE       Row Name 10/09/24 1421          Lower Body Dressing Assessment/Training    [place] Level (Lower Body Dressing) don;shoes/slippers;set up;standby assist  -CE     Position (Lower Body Dressing) edge of bed sitting  -CE       Row Name 10/09/24 1421          Grooming Assessment/Training    [place] Level (Grooming) contact guard assist;wash face, hands  -CE     Position (Grooming) sink side;supported standing  -CE     Comment, (Grooming) Pt leaning on counter for support and dtr states pt does this \"a lot\" at baseline  -CE       Row Name " "10/09/24 1421          Toileting Assessment/Training    [place] Level (Toileting) set up;supervision  -CE     Assistive Devices (Toileting) urinal  -CE     Position (Toileting) edge of bed sitting  -CE               User Key  (r) = Recorded By, (t) = Taken By, (c) = Cosigned By      Initials Name Provider Type    Stacy Franklin OT Occupational Therapist                   Obj/Interventions       Row Name 10/09/24 1424          Sensory Assessment (Somatosensory)    Sensory Assessment pt reporting numbness and tingling \"a lot better\" in UEs now vs. when he arrived  -       Row Name 10/09/24 1424          Vision Assessment/Intervention    Visual Impairment/Limitations corrective lenses for distance;corrective lenses for reading  -       Row Name 10/09/24 1424          Range of Motion Comprehensive    General Range of Motion no range of motion deficits identified  -       Row Name 10/09/24 1424          Strength Comprehensive (MMT)    Comment, General Manual Muscle Testing (MMT) Assessment pt with generalized weakness throughout but grossly >/= 4/5  -CE       Row Name 10/09/24 1424          Balance    Balance Assessment standing dynamic balance  -CE     Dynamic Standing Balance contact guard;minimal assist;1-person assist  -CE     Position/Device Used, Standing Balance walker, front-wheeled  -CE               User Key  (r) = Recorded By, (t) = Taken By, (c) = Cosigned By      Initials Name Provider Type    Stacy Franklin OT Occupational Therapist                   Goals/Plan       Row Name 10/09/24 1432          Bed Mobility Goal 1 (OT)    Activity/Assistive Device (Bed Mobility Goal 1, OT) bed mobility activities, all  -CE     [place] Level/Cues Needed (Bed Mobility Goal 1, OT) modified independence  -CE     Time Frame (Bed Mobility Goal 1, OT) short term goal (STG);2 weeks  -       Row Name 10/09/24 1432          Transfer Goal 1 (OT)    Activity/Assistive Device (Transfer Goal 1, OT) " transfers, all  -CE     [place] Level/Cues Needed (Transfer Goal 1, OT) modified independence  -CE     Time Frame (Transfer Goal 1, OT) short term goal (STG);2 weeks  -CE       Row Name 10/09/24 1432          Bathing Goal 1 (OT)    Activity/Device (Bathing Goal 1, OT) bathing skills, all  -CE     [place] Level/Cues Needed (Bathing Goal 1, OT) modified independence  -CE     Time Frame (Bathing Goal 1, OT) short term goal (STG);2 weeks  -CE       Row Name 10/09/24 1432          Dressing Goal 1 (OT)    Activity/Device (Dressing Goal 1, OT) dressing skills, all  -CE     [place]/Cues Needed (Dressing Goal 1, OT) modified independence  -CE     Time Frame (Dressing Goal 1, OT) short term goal (STG);2 weeks  -CE       Row Name 10/09/24 1432          Toileting Goal 1 (OT)    Activity/Device (Toileting Goal 1, OT) toileting skills, all  -CE     [place] Level/Cues Needed (Toileting Goal 1, OT) modified independence  -CE     Time Frame (Toileting Goal 1, OT) short term goal (STG);2 weeks  -CE       Row Name 10/09/24 1432          Strength Goal 1 (OT)    Strength Goal 1 (OT) Pt will be independent with HEP focusing on increasing strength in prep for I/ADLs.  -CE       Row Name 10/09/24 1432          Therapy Assessment/Plan (OT)    Planned Therapy Interventions (OT) activity tolerance training;functional balance retraining;occupation/activity based interventions;strengthening exercise;BADL retraining;patient/caregiver education/training;transfer/mobility retraining  -CE               User Key  (r) = Recorded By, (t) = Taken By, (c) = Cosigned By      Initials Name Provider Type    CE Stacy Calvin, OT Occupational Therapist                   Clinical Impression       Row Name 10/09/24 1427          Pain Assessment    Pretreatment Pain Rating 0/10 - no pain  -CE     Posttreatment Pain Rating 0/10 - no pain  -CE       Row Name 10/09/24 1427          Plan of Care Review    Plan of Care Reviewed With  "patient;daughter  -CE     Progress no change  -CE     Outcome Evaluation Pt seen for OT eval after admission 2/2 fall sustaining nasal bone fracture and found to have low sodium as well. Pt initially reporting parasthesias in UEs but stating they are \"much better\" today. 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 Pts dtr lives w/ him and is able to provide 24 hr assist/supervision. Pt agreeable to get OOB this date and able to complete bed mobility with SBA and LB ADLs with SBA/supervision for safety seated unsupported at EOB. Pt ambulating in room/bathroom with Kim x 1 using FWW. Pt stating he is used to rollator but OT recommending FWW for now until PT able to address use of rollator (concerned this may get away from pt and thus lead to another fall, or he would forget to utilize brakes). Pt left seated in chair wiht chair alarm pad plugged into box, however, box not functioning. Nursing staff aware and RN agreeable for pt to stay up as dtr present. OT will con't to follow for stated goals and recommend d/c to home with 24 hr sup/assist and HHOT.  -CE       Row Name 10/09/24 1427          Therapy Assessment/Plan (OT)    Rehab Potential (OT) good, to achieve stated therapy goals  -CE     Criteria for Skilled Therapeutic Interventions Met (OT) yes  -CE     Therapy Frequency (OT) 3 times/wk  -CE       Row Name 10/09/24 1427          Therapy Plan Review/Discharge Plan (OT)    Anticipated Discharge Disposition (OT) home with assist;home with 24/7 care;home with home health  -CE       Row Name 10/09/24 1427          Vital Signs    O2 Delivery Pre Treatment room air  -CE     O2 Delivery Intra Treatment room air  -CE     O2 Delivery Post Treatment room air  -CE     Pre Patient Position Supine  -CE     Intra Patient Position Standing  -CE     Post Patient Position Sitting  -CE       Row Name 10/09/24 1427          Positioning and Restraints    Pre-Treatment Position in bed  -CE     Post Treatment Position chair  -CE     In " Chair notified nsg;reclined;sitting;call light within reach;encouraged to call for assist;exit alarm on;with family/caregiver  -CE               User Key  (r) = Recorded By, (t) = Taken By, (c) = Cosigned By      Initials Name Provider Type    Stacy Franklin OT Occupational Therapist                   Outcome Measures       Row Name 10/09/24 1433          How much help from another is currently needed...    Putting on and taking off regular lower body clothing? 3  -CE     Bathing (including washing, rinsing, and drying) 3  -CE     Toileting (which includes using toilet bed pan or urinal) 3  -CE     Putting on and taking off regular upper body clothing 4  -CE     Taking care of personal grooming (such as brushing teeth) 4  -CE     Eating meals 4  -CE     AM-PAC 6 Clicks Score (OT) 21  -CE       Row Name 10/09/24 1029 10/09/24 0838       How much help from another person do you currently need...    Turning from your back to your side while in flat bed without using bedrails? 3  -CW 3  -CH    Moving from lying on back to sitting on the side of a flat bed without bedrails? 3  -CW 3  -CH    Moving to and from a bed to a chair (including a wheelchair)? 3  -CW 3  -CH    Standing up from a chair using your arms (e.g., wheelchair, bedside chair)? 3  -CW 3  -CH    Climbing 3-5 steps with a railing? 3  -CW 3  -CH    To walk in hospital room? 3  -CW 3  -CH    AM-PAC 6 Clicks Score (PT) 18  -CW 18  -CH    Highest Level of Mobility Goal 6 --> Walk 10 steps or more  -CW 6 --> Walk 10 steps or more  -CH      Row Name 10/09/24 1433 10/09/24 1029       Functional Assessment    Outcome Measure Options AM-PAC 6 Clicks Daily Activity (OT)  -CE AM-PAC 6 Clicks Basic Mobility (PT)  -CW              User Key  (r) = Recorded By, (t) = Taken By, (c) = Cosigned By      Initials Name Provider Type    Magdalena Caraballo RN Registered Nurse    CW Cindi rAango, PT Physical Therapist    Stacy Franklin OT Occupational  Therapist                    Occupational Therapy Education       Title: PT OT SLP Therapies (In Progress)       Topic: Occupational Therapy (Done)       Point: ADL training (Done)       Description:   Instruct learner(s) on proper safety adaptation and remediation techniques during self care or transfers.   Instruct in proper use of assistive devices.                  Learning Progress Summary             Patient Acceptance, E, VU,NR by CE at 10/9/2024 1434                         Point: Home exercise program (Done)       Description:   Instruct learner(s) on appropriate technique for monitoring, assisting and/or progressing therapeutic exercises/activities.                  Learning Progress Summary             Patient Acceptance, E, VU,NR by CE at 10/9/2024 1434                         Point: Precautions (Done)       Description:   Instruct learner(s) on prescribed precautions during self-care and functional transfers.                  Learning Progress Summary             Patient Acceptance, E, VU,NR by CE at 10/9/2024 1434                         Point: Body mechanics (Done)       Description:   Instruct learner(s) on proper positioning and spine alignment during self-care, functional mobility activities and/or exercises.                  Learning Progress Summary             Patient Acceptance, E, VU,NR by CE at 10/9/2024 1434                                         User Key       Initials Effective Dates Name Provider Type Discipline     10/17/22 -  Stacy Calvin OT Occupational Therapist OT                  OT Recommendation and Plan  Planned Therapy Interventions (OT): activity tolerance training, functional balance retraining, occupation/activity based interventions, strengthening exercise, BADL retraining, patient/caregiver education/training, transfer/mobility retraining  Therapy Frequency (OT): 3 times/wk  Plan of Care Review  Plan of Care Reviewed With: patient, daughter  Progress: no  "change  Outcome Evaluation: Pt seen for OT eval after admission 2/2 fall sustaining nasal bone fracture and found to have low sodium as well. Pt initially reporting parasthesias in UEs but stating they are \"much better\" today. 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 Pts dtr lives w/ him and is able to provide 24 hr assist/supervision. Pt agreeable to get OOB this date and able to complete bed mobility with SBA and LB ADLs with SBA/supervision for safety seated unsupported at EOB. Pt ambulating in room/bathroom with Kim x 1 using FWW. Pt stating he is used to rollator but OT recommending FWW for now until PT able to address use of rollator (concerned this may get away from pt and thus lead to another fall, or he would forget to utilize brakes). Pt left seated in chair wiht chair alarm pad plugged into box, however, box not functioning. Nursing staff aware and RN agreeable for pt to stay up as dtr present. OT will con't to follow for stated goals and recommend d/c to home with 24 hr sup/assist and HHOT.     Time Calculation:   Evaluation Complexity (OT)  Review Occupational Profile/Medical/Therapy History Complexity: expanded/moderate complexity  Assessment, Occupational Performance/Identification of Deficit Complexity: 3-5 performance deficits  Clinical Decision Making Complexity (OT): detailed assessment/moderate complexity  Overall Complexity of Evaluation (OT): moderate complexity     Time Calculation- OT       Row Name 10/09/24 1434             Time Calculation- OT    OT Start Time 1312  -CE      OT Stop Time 1335  -CE      OT Time Calculation (min) 23 min  -CE      Total Timed Code Minutes- OT 10 minute(s)  -CE      OT Received On 10/09/24  -CE      OT - Next Appointment 10/10/24  -CE      OT Goal Re-Cert Due Date 10/23/24  -CE         Timed Charges    86976 - OT Self Care/Mgmt Minutes 10  -CE         Total Minutes    Timed Charges Total Minutes 10  -CE       Total Minutes 10  -CE                User Key  (r) = " Recorded By, (t) = Taken By, (c) = Cosigned By      Initials Name Provider Type    CE Stacy Calvin OT Occupational Therapist                  Therapy Charges for Today       Code Description Service Date Service Provider Modifiers Qty    81080090456 HC OT SELF CARE/MGMT/TRAIN EA 15 MIN 10/9/2024 Stacy Calvin OT GO 1                 Stacy Calvin OT  10/9/2024

## 2024-10-09 NOTE — CONSULTS
Chap visited with pt and daughter at bedside.  Pt and grief discussed grief over loss of wife/mom recently.  Pt expressed gratitude for his wonderful medical team taking great care of him. Daughter asked chap theological question surrounding afterlife and loved ones living on through their family and friends still alive on earth.  Pt described anxiety about MRI and claustrophobia.  Chap prayed for wisdom and discernment, peace and comfort, and gave thanks for Flro, pt's wife of 63 years. Chap remains available.

## 2024-10-09 NOTE — PLAN OF CARE
Goal Outcome Evaluation:         Pt resting in bed quietly. Medicated prn for arm pain. Turns self. Up to bsc with assist. Bm this shift.

## 2024-10-09 NOTE — PLAN OF CARE
Goal Outcome Evaluation:  Plan of Care Reviewed With: patient, daughter           Outcome Evaluation: Pt participated with PT this morning. Pt agreeable and eager to mobilize. Pt completed supine>sit with cga (pulling from PT as bed rail), he completed STS with min A and ambulated 60' cga with walker. Pt reports his L knee feels weak and reports some buckling yesterday however no overt unsteadiness or buckling noted today. Daughter present - PT discussed DC recs of fercho with 24/7 assist,  PT, and RW.      Anticipated Discharge Disposition (PT): home with 24/7 care, home with home health

## 2024-10-09 NOTE — PLAN OF CARE
"Goal Outcome Evaluation:  Plan of Care Reviewed With: patient, daughter        Progress: no change  Outcome Evaluation: Pt seen for OT eval after admission 2/2 fall sustaining nasal bone fracture and found to have low sodium as well. Pt initially reporting parasthesias in UEs but stating they are \"much better\" today. 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 Pts dtr lives w/ him and is able to provide 24 hr assist/supervision. Pt agreeable to get OOB this date and able to complete bed mobility with SBA and LB ADLs with SBA/supervision for safety seated unsupported at EOB. Pt ambulating in room/bathroom with Kim x 1 using FWW. Pt stating he is used to rollator but OT recommending FWW for now until PT able to address use of rollator (concerned this may get away from pt and thus lead to another fall, or he would forget to utilize brakes). Pt left seated in chair wiht chair alarm pad plugged into box, however, box not functioning. Nursing staff aware and RN agreeable for pt to stay up as dtr present. OT will con't to follow for stated goals and recommend d/c to home with 24 hr sup/assist and HHOT.      Anticipated Discharge Disposition (OT): home with assist, home with 24/7 care, home with home health                        "

## 2024-10-09 NOTE — THERAPY TREATMENT NOTE
Patient Name: Shaheen Saini  : 1935    MRN: [id number]                              Today's Date: 10/9/2024       Admit Date: 10/7/2024    Visit Dx:     ICD-10-CM ICD-9-CM   1. Fall from standing, initial encounter  W19.XXXA E888.9   2. Closed head injury, initial encounter  S09.90XA 959.01   3. Closed fracture of nasal bone, initial encounter  S02.2XXA 802.0   4. Unable to ambulate  R26.2 719.7   5. Strain of neck muscle, initial encounter  S16.1XXA 847.0   6. Multiple skin tears  T14.8XXA 879.8     Patient Active Problem List   Diagnosis    Diabetes mellitus    Hyperlipidemia    Hypertension    GERD (gastroesophageal reflux disease)    CAD (coronary artery disease)    History of echocardiogram    History of cardiac cath    History of coronary artery bypass surgery    Mild aortic insufficiency    Cellulitis of right lower extremity    Type 2 diabetes mellitus, without long-term current use of insulin    PAF (paroxysmal atrial fibrillation)    Gangrenous cholecystitis    Fall    Hyperextension injury of cervical spine, initial encounter    Numbness and tingling of both upper extremities    Cervical stenosis of spinal canal     Past Medical History:   Diagnosis Date    CAD (coronary artery disease)     Cholelithiasis 2024    Removed Gallbladder also stones    Diabetes mellitus     GERD (gastroesophageal reflux disease)     History of cardiac cath     12 - LVEF 65%. 1+ MV insufficiency. Three vessel CAD. Three of four bypass grafts remain patent. The vein graft to the third marginal branch is a previously documented occlusion. The distal circumflex is unchanged from angiogram 09. 09 - LVEF 65%.Three vessel CAD s/p CABG. One fraft to marginal branch is occluded, the rest of the grafts are patent. The marginal branch is diffusely dis    History of echocardiogram     18 - LA mildly dilated. Normal LV function. Mild MR. Mild-moderate TR. Mild AR. 2012 - EF 60-65%. Moderate MI, TI, and  PI. Mild AI.    Hyperlipidemia     Hypertension     Paroxysmal atrial fibrillation      Past Surgical History:   Procedure Laterality Date    CHOLECYSTECTOMY WITH INTRAOPERATIVE CHOLANGIOGRAM N/A 08/18/2024    Procedure: CHOLECYSTECTOMY LAPAROSCOPIC INTRAOPERATIVE CHOLANGIOGRAM;  Surgeon: Inocencia Vieira MD;  Location: [facility];  Service: General;  Laterality: N/A;    CORONARY ARTERY BYPASS GRAFT  09/06/2005    CABG x4. Internal mammary artery to LAD, vein graft to diagonal artery, vein graft to OM, vein graft to posterior descending artery.    EYE SURGERY      Cataracts removed      General Information       Row Name 10/09/24 1005          Physical Therapy Time and Intention    Document Type therapy note (daily note)  -CW     Mode of Treatment physical therapy;individual therapy  -CW       Row Name 10/09/24 1005          General Information    Patient Profile Reviewed yes  -CW     Existing Precautions/Restrictions fall  -CW       Row Name 10/09/24 1005          Cognition    Orientation Status (Cognition) oriented x 3;other (see comments)  repetitive  -CW       Row Name 10/09/24 1005          Safety Issues, Functional Mobility    Impairments Affecting Function (Mobility) balance;endurance/activity tolerance;strength;pain;range of motion (ROM)  -CW               User Key  (r) = Recorded By, (t) = Taken By, (c) = Cosigned By      Initials Name Provider Type    CW Cindi Arango PT Physical Therapist                   Mobility       Row Name 10/09/24 1006          Bed Mobility    Bed Mobility supine-sit;sit-supine  -CW     Supine-Sit [place] (Bed Mobility) contact guard;verbal cues  -CW     Sit-Supine [place] (Bed Mobility) standby assist;verbal cues  -CW     Assistive Device (Bed Mobility) head of bed elevated  -CW     Comment, (Bed Mobility) pt pulling from PT for supine>sit  -CW       Row Name 10/09/24 1006          Sit-Stand Transfer    Sit-Stand [place] (Transfers) minimum assist (75%  patient effort);1 person assist;verbal cues  -CW     Assistive Device (Sit-Stand Transfers) walker, front-wheeled  -CW       Row Name 10/09/24 1006          Gait/Stairs (Locomotion)    [place] Level (Gait) contact guard;verbal cues  -CW     Assistive Device (Gait) walker, front-wheeled  -CW     Distance in Feet (Gait) 60  -CW     Deviations/Abnormal Patterns (Gait) santhosh decreased;gait speed decreased;stride length decreased  -CW     Bilateral Gait Deviations forward flexed posture  -CW     Comment, (Gait/Stairs) Pt reports L knee feels weak, no overt evidence of knee buckling noted with ambulation  -CW               User Key  (r) = Recorded By, (t) = Taken By, (c) = Cosigned By      Initials Name Provider Type    Cindi Sy PT Physical Therapist                   Obj/Interventions       Row Name 10/09/24 1010          Balance    Balance Assessment standing static balance;standing dynamic balance  -CW     Static Standing Balance contact guard  -CW     Dynamic Standing Balance contact guard  -CW     Position/Device Used, Standing Balance supported;walker, front-wheeled  -CW               User Key  (r) = Recorded By, (t) = Taken By, (c) = Cosigned By      Initials Name Provider Type    Cindi Sy PT Physical Therapist                   Goals/Plan    No documentation.                  Clinical Impression       Row Name 10/09/24 1010          Pain    Pretreatment Pain Rating 0/10 - no pain  -CW     Posttreatment Pain Rating 0/10 - no pain  -CW       Row Name 10/09/24 1010          Plan of Care Review    Plan of Care Reviewed With patient;daughter  -CW     Outcome Evaluation Pt participated with PT this morning. Pt agreeable and eager to mobilize. Pt completed supine>sit with cga (pulling from PT as bed rail), he completed STS with min A and ambulated 60' cga with walker. Pt reports his L knee feels weak and reports some buckling yesterday however no overt unsteadiness or buckling noted  today. Daughter present - PT discussed DC recs of fercho with 24/7 assist, HH PT, and RW.  -CW       Row Name 10/09/24 1010          Vital Signs    O2 Delivery Pre Treatment room air  -CW       Row Name 10/09/24 1010          Positioning and Restraints    Pre-Treatment Position in bed  -CW     Post Treatment Position bed  -CW     In Bed notified nsg;call light within reach;encouraged to call for assist;exit alarm on;fowlers;with family/caregiver  -CW               User Key  (r) = Recorded By, (t) = Taken By, (c) = Cosigned By      Initials Name Provider Type    Cindi Sy PT Physical Therapist                   Outcome Measures       Row Name 10/09/24 1029          How much help from another person do you currently need...    Turning from your back to your side while in flat bed without using bedrails? 3  -CW     Moving from lying on back to sitting on the side of a flat bed without bedrails? 3  -CW     Moving to and from a bed to a chair (including a wheelchair)? 3  -CW     Standing up from a chair using your arms (e.g., wheelchair, bedside chair)? 3  -CW     Climbing 3-5 steps with a railing? 3  -CW     To walk in hospital room? 3  -CW     AM-PAC 6 Clicks Score (PT) 18  -CW     Highest Level of Mobility Goal 6 --> Walk 10 steps or more  -CW       Row Name 10/09/24 1029          Functional Assessment    Outcome Measure Options AM-PAC 6 Clicks Basic Mobility (PT)  -CW               User Key  (r) = Recorded By, (t) = Taken By, (c) = Cosigned By      Initials Name Provider Type    Cindi Sy PT Physical Therapist                                 Physical Therapy Education       Title: PT OT SLP Therapies (In Progress)       Topic: Physical Therapy (In Progress)       Point: Mobility training (Done)       Learning Progress Summary             Patient Acceptance, E, VU,NR by CW at 10/9/2024 1030    Acceptance, E,TB,D, VU,NR by  at 10/8/2024 1720                         Point: Home exercise program  (Not Started)       Learner Progress:  Not documented in this visit.              Point: Body mechanics (Done)       Learning Progress Summary             Patient Acceptance, E, VU,NR by  at 10/9/2024 1030    Acceptance, E,TB,D, VU,NR by  at 10/8/2024 1720                         Point: Precautions (Done)       Learning Progress Summary             Patient Acceptance, E,TB,D, VU,NR by  at 10/8/2024 1720                                         User Key       Initials Effective Dates Name Provider Type Discipline     12/13/22 -  Cindi Arango, PT Physical Therapist PT     04/08/22 -  Noelle Colbert PT Physical Therapist PT                  PT Recommendation and Plan     Plan of Care Reviewed With: patient, daughter  Outcome Evaluation: Pt participated with PT this morning. Pt agreeable and eager to mobilize. Pt completed supine>sit with cga (pulling from PT as bed rail), he completed STS with min A and ambulated 60' cga with walker. Pt reports his L knee feels weak and reports some buckling yesterday however no overt unsteadiness or buckling noted today. Daughter present - PT discussed DC recs of fercho with 24/7 assist,  PT, and RW.     Time Calculation:         PT Charges       Row Name 10/09/24 1030             Time Calculation    Start Time 0918  -CW      Stop Time 0933  -      Time Calculation (min) 15 min  -      PT Received On 10/09/24  -      PT - Next Appointment 10/10/24  -                User Key  (r) = Recorded By, (t) = Taken By, (c) = Cosigned By      Initials Name Provider Type     Cindi Arango, PT Physical Therapist                  Therapy Charges for Today       Code Description Service Date Service Provider Modifiers Qty    [id number]  PT THERAPEUTIC ACT EA 15 MIN 10/9/2024 Cindi Arango, PT GP 1            PT G-Codes  Outcome Measure Options: AM-PAC 6 Clicks Basic Mobility (PT)  AM-PAC 6 Clicks Score (PT): 18  PT Discharge Summary  Anticipated Discharge  Disposition (PT): home with 24/7 care, home with home health    Cindi Arango, PT  10/9/2024

## 2024-10-09 NOTE — CONSULTS
"[facility] NEUROSURGERY CONSULT NOTE    Patient name: Shaheen Saini  Referring Provider: Mary Echols MD  Reason for Consultation: s/p; CT cervical spine reveals multi-level canal stenosis with concern for possible central cord syndrome.    Patient Care Team:  Pal Lawrence MD as PCP - General (Family Medicine)  Tommy Ellis MD as Consulting Physician (Cardiology)    Chief complaint: Bilateral arm and hand numbness/weakness.     Subjective .     History of present illness:    Patient is a 89 y.o. right handed male with a history of type 2 diabetes mellitus controlled with insulin, CAD with previous CABG, hypertension, hyperlipidemia, atrial fibrillation on daily 81 mg aspirin only at home.  He also recently underwent cholecystectomy back in August of this year for gangrenous cholecystitis.  The patient uses a cane with ambulation.  He reports having a fall yesterday when he states his cane got in his way and he subsequently tripped and fell.  He landed on his face with evidence of nasal and forehead abrasions.  He denied any loss of consciousness.  He denied any syncopal episode, seizures, he denies any neck pain, thoracolumbar pain, arm or leg pain.  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  He reports no history of recurrent falls.  He stated that last time he fell was over a year ago.  He denies any trouble with upper or lower extremity weakness.  He states that he has been undergoing physical therapy and felt that he was doing quite well with it.  There is no history of bowel or bladder incontinence.  Due to the recent trauma from the fall, CT imaging of the head, face and cervical spine was performed in the emergency department which revealed no acute intracranial abnormality, no evidence of facial fractures other than an acute fracture of the nasal bone, and multilevel degenerative changes in the cervical spine with no evidence of cervical " fracture.  The patient stated that after his fall, he had numbness and tingling as well as a sense of weakness in both of his upper extremities as well as his hands.  He now states that the symptoms have improved.  He denies any numbness, tingling or weakness in his lower extremities.  MRI imaging of the cervical spine had been ordered however the patient refuses the MRI due to severe claustrophobia.  Neurosurgery was asked to evaluate the cervical CT scan and give further recommendations.    Review of Systems  Review of Systems   Constitutional:  Negative for activity change, appetite change, chills and fever.   HENT:  Positive for facial swelling and nosebleeds. Negative for ear discharge, hearing loss, rhinorrhea and trouble swallowing.         The patient has abrasions to his forehead and his nose with subsequent facial swelling and bleeding from the nose initially after the fall.   Eyes: Negative.  Negative for photophobia and visual disturbance.   Respiratory: Negative.  Negative for apnea, cough, chest tightness and shortness of breath.    Cardiovascular: Negative.  Negative for chest pain.   Gastrointestinal: Negative.  Negative for constipation, diarrhea and nausea.        Denies any history of bowel incontinence.   Endocrine: Negative.    Genitourinary:  Negative for difficulty urinating, dysuria, frequency and urgency.        Denies any history of bladder incontinence   Musculoskeletal: Negative.  Negative for back pain, gait problem, neck pain and neck stiffness.        The patient states he has no difficulties with gait or balance as long as he uses his cane   Skin:  Positive for wound.        Forehead and nasal abrasions noted from recent trauma due to fall.   Allergic/Immunologic: Negative.    Neurological:  Positive for weakness and numbness. Negative for dizziness, seizures, speech difficulty and headaches.        The patient reports some numbness in his arms and hands bilaterally as well as some  associated weakness after the initial fall.  He states that the symptoms have since resolved.   Hematological: Negative.    Psychiatric/Behavioral: Negative.  Negative for agitation, confusion and sleep disturbance.    All other systems reviewed and are negative.      History  PAST MEDICAL HISTORY  Past Medical History:   Diagnosis Date    CAD (coronary artery disease)     Cholelithiasis 8/18/2024    Removed Gallbladder also stones    Diabetes mellitus     GERD (gastroesophageal reflux disease)     History of cardiac cath     7/23/12 - LVEF 65%. 1+ MV insufficiency. Three vessel CAD. Three of four bypass grafts remain patent. The vein graft to the third marginal branch is a previously documented occlusion. The distal circumflex is unchanged from angiogram 5/4/09. 5/4/09 - LVEF 65%.Three vessel CAD s/p CABG. One fraft to marginal branch is occluded, the rest of the grafts are patent. The marginal branch is diffusely dis    History of echocardiogram     1/9/18 - LA mildly dilated. Normal LV function. Mild MR. Mild-moderate TR. Mild AR. 7/22/2012 - EF 60-65%. Moderate MI, TI, and PI. Mild AI.    Hyperlipidemia     Hypertension     Paroxysmal atrial fibrillation        PAST SURGICAL HISTORY  Past Surgical History:   Procedure Laterality Date    CHOLECYSTECTOMY WITH INTRAOPERATIVE CHOLANGIOGRAM N/A 08/18/2024    Procedure: CHOLECYSTECTOMY LAPAROSCOPIC INTRAOPERATIVE CHOLANGIOGRAM;  Surgeon: Inocencia Vieira MD;  Location: [facility];  Service: General;  Laterality: N/A;    CORONARY ARTERY BYPASS GRAFT  09/06/2005    CABG x4. Internal mammary artery to LAD, vein graft to diagonal artery, vein graft to OM, vein graft to posterior descending artery.    EYE SURGERY      Cataracts removed       FAMILY HISTORY  Family History   Problem Relation Age of Onset    Heart disease Mother     Arthritis Mother        SOCIAL HISTORY  Social History     Tobacco Use    Smoking status: Never    Smokeless tobacco: Never   Vaping Use     Vaping status: Never Used   Substance Use Topics    Alcohol use: No    Drug use: Never       retired    Allergies:  Patient has no known allergies.    MEDICATIONS:  Medications Prior to Admission   Medication Sig Dispense Refill Last Dose    ACCU-CHEK SOFTCLIX LANCETS lancets 1 each by Other route Daily.       aspirin 81 MG EC tablet Take 1 tablet by mouth Daily.       atenolol (TENORMIN) 25 MG tablet Take 0.5 tablets by mouth Daily.       atorvastatin (LIPITOR) 20 MG tablet Take 1 tablet by mouth Daily.       Blood Glucose Monitoring Suppl (ACCU-CHEK GUIDE) w/Device kit 1 Device Daily.       esomeprazole (nexIUM) 20 MG capsule Take 1 capsule by mouth Every Morning Before Breakfast.       fexofenadine-pseudoephedrine (ALLEGRA-D)  MG per 12 hr tablet Take 1 tablet by mouth 2 (Two) Times a Day.       glipizide (GLUCOTROL XL) 5 MG ER tablet Take 1 tablet by mouth Daily.       glucose blood test strip 1 each by Other route Daily.       multivitamin with minerals (MULTIVITAMIN ADULT PO) Take 1 tablet by mouth Daily.       nitroglycerin (NITROSTAT) 0.4 MG SL tablet Place 1 tablet under the tongue Every 5 (Five) Minutes As Needed for Chest Pain. Take no more than 3 doses in 15 minutes.       oxazepam (SERAX) 15 MG capsule Take 1 capsule by mouth Daily.       PARoxetine (PAXIL) 10 MG tablet Take 1 tablet by mouth Every Morning.       ranolazine (RANEXA) 500 MG 12 hr tablet Take 1 tablet by mouth 2 (Two) Times a Day. 180 tablet 3     vitamin C (ASCORBIC ACID) 500 MG tablet Take 1 tablet by mouth.            Current Facility-Administered Medications:     acetaminophen (TYLENOL) tablet 650 mg, 650 mg, Oral, Q4H PRN, 650 mg at 10/08/24 2352 **OR** acetaminophen (TYLENOL) 160 MG/5ML oral solution 650 mg, 650 mg, Oral, Q4H PRN **OR** acetaminophen (TYLENOL) suppository 650 mg, 650 mg, Rectal, Q4H PRN, Shruthi Bradford APRN    aspirin EC tablet 81 mg, 81 mg, Oral, Daily, Shanti Rodriguez APRN, 81 mg at  10/09/24 0838    atenolol (TENORMIN) tablet 12.5 mg, 12.5 mg, Oral, Daily, Shanti Rodriguez APRN, 12.5 mg at 10/09/24 0839    atorvastatin (LIPITOR) tablet 20 mg, 20 mg, Oral, Daily, Shanti Rodriguez APRN, 20 mg at 10/09/24 0838    sennosides-docusate (PERICOLACE) 8.6-50 MG per tablet 2 tablet, 2 tablet, Oral, BID PRN **AND** polyethylene glycol (MIRALAX) packet 17 g, 17 g, Oral, Daily PRN **AND** bisacodyl (DULCOLAX) EC tablet 5 mg, 5 mg, Oral, Daily PRN **AND** bisacodyl (DULCOLAX) suppository 10 mg, 10 mg, Rectal, Daily PRN, Shruthi Bradford APRN    cetirizine (zyrTEC) tablet 5 mg, 5 mg, Oral, BID, Shanti Rodriguez APRN, 5 mg at 10/09/24 0838    dextrose (D50W) (25 g/50 mL) IV injection 25 g, 25 g, Intravenous, Q15 Min PRN, Shruthi Bradford APRN    dextrose (GLUTOSE) oral gel 15 g, 15 g, Oral, Q15 Min PRN, Shruthi Bradford APRN    Diclofenac Sodium (VOLTAREN) 1 % gel 2 g, 2 g, Topical, BID, Shanti Rodriguez APRN, 2 g at 10/09/24 0838    famotidine (PEPCID) tablet 20 mg, 20 mg, Oral, BID PRN, Shruthi Bradford APRN    glucagon (GLUCAGEN) injection 1 mg, 1 mg, Intramuscular, Q15 Min PRN, Shruthi Bradford APRN    insulin lispro (HUMALOG/ADMELOG) injection 2-7 Units, 2-7 Units, Subcutaneous, 4x Daily AC & at Bedtime, Shruthi Bradford APRN, 3 Units at 10/08/24 1208    melatonin tablet 5 mg, 5 mg, Oral, Nightly PRN, Shruthi Bradford APRN    nitroglycerin (NITROSTAT) SL tablet 0.4 mg, 0.4 mg, Sublingual, Q5 Min PRN, Shruthi Bradford APRN    ondansetron (ZOFRAN) injection 4 mg, 4 mg, Intravenous, Q6H PRN, Shruthi Bradford APRN    oxazepam (SERAX) capsule 10 mg, 10 mg, Oral, Nightly PRN, Ryan Moreland MD    pantoprazole (PROTONIX) EC tablet 40 mg, 40 mg, Oral, Q AM, Shanti Rodriguez APRN, 40 mg at 10/09/24 0602    PARoxetine (PAXIL) tablet 10 mg, 10 mg, Oral, QAM, Shanti Rodriguez APRN, 10 mg at 10/09/24 0602    ranolazine  (RANEXA) 12 hr tablet 500 mg, 500 mg, Oral, BID, Shanti Rodriguez, APRN, 500 mg at 10/09/24 0839    sodium chloride 0.9 % flush 10 mL, 10 mL, Intravenous, PRN, Sanchez Shruthi DAVID, APRN, 10 mL at 10/09/24 0839      Objective     Results Review:  LABS:  Results from last 7 days   Lab Units 10/09/24  0438 10/08/24  0624 10/07/24  2357   WBC 10*3/mm3 12.71* 8.68 11.16*   HEMOGLOBIN g/dL 12.1* 12.4* 13.1   HEMATOCRIT % 35.4* 37.4* 38.9   PLATELETS 10*3/mm3 252 260 268     Results from last 7 days   Lab Units 10/09/24  0438 10/08/24  0624 10/07/24  2357   SODIUM mmol/L 125* 130* 130*   POTASSIUM mmol/L 3.8 4.1 4.2   CHLORIDE mmol/L 94* 98 97*   CO2 mmol/L 24.0 24.0 22.3   BUN mg/dL 16 13 15   CREATININE mg/dL 0.84 0.65* 0.81   CALCIUM mg/dL 8.6 9.1 8.9   BILIRUBIN mg/dL  --  0.4 0.4   ALK PHOS U/L  --  80 76   ALT (SGPT) U/L  --  21 20   AST (SGOT) U/L  --  23 17   GLUCOSE mg/dL 116* 180* 210*       DIAGNOSTICS:    CT HEAD AND MAXILLOFACIAL WO CONTRAST     No acute intracranial abnormality. No evidence of hemorrhage, hydrocephalus, no mass effect, acute ischemia or calvarial fracture. Acute nasal bone fracture. Remaining facial bones are intact.     CT CERVICAL SPINE WO CONTRAST 10/08/2024    Degenerative disc disease with endplate inflammation greatest at C4/5 and C5/6. Foraminal narrowing greater on the left at C5/6, and lesser extent on the left at C3/4. Foraminal narrowing greater on the right at C4/5. No evidence of fracture in the cervical spine.     Results Review:   I reviewed the patient's new clinical results.  I personally viewed and interpreted the patient's     Vital Signs   Temp:  [97.6 °F (36.4 °C)-99 °F (37.2 °C)] 99 °F (37.2 °C)  Heart Rate:  [] 104  Resp:  [16-20] 18  BP: (136-158)/(64-85) 136/85    Physical Exam:  Physical Exam  Vitals reviewed.   Constitutional:       General: He is not in acute distress.     Appearance: He is well-developed and normal weight. He is not ill-appearing,  toxic-appearing or diaphoretic.   HENT:      Head: Normocephalic.      Comments: Abrasions noted to the center of the forehead as well as the bridge of the nose and there is also associated crusting in the nares bilaterally.  No evidence of Garnica sign.  No evidence of otorrhea or rhinorrhea.     Right Ear: External ear normal.      Left Ear: External ear normal.      Nose: Nose normal. No rhinorrhea.      Mouth/Throat:      Mouth: Mucous membranes are moist.      Pharynx: No oropharyngeal exudate.   Eyes:      General:         Right eye: No discharge.         Left eye: No discharge.      Extraocular Movements: Extraocular movements intact.      Conjunctiva/sclera: Conjunctivae normal.   Neck:      Trachea: No tracheal deviation.   Cardiovascular:      Rate and Rhythm: Normal rate.   Pulmonary:      Effort: Pulmonary effort is normal. No respiratory distress.   Abdominal:      General: There is no distension.      Palpations: Abdomen is soft.      Tenderness: There is no abdominal tenderness.   Musculoskeletal:         General: Swelling present. No tenderness. Normal range of motion.      Cervical back: Normal range of motion and neck supple. No rigidity or tenderness.      Right lower leg: No edema.      Left lower leg: No edema.      Comments: Slight swelling to the right hand/wrist.   Skin:     General: Skin is warm and dry.      Findings: No erythema.      Comments: The patient has an abrasion injury to the right wrist with mild associated swelling.  Wrist wrap dressing is intact.     Neurological:      Mental Status: He is oriented to person, place, and time.      GCS: GCS eye subscore is 4. GCS verbal subscore is 5. GCS motor subscore is 6.      Cranial Nerves: No facial asymmetry.      Sensory: Sensation is intact. No sensory deficit.      Motor: Weakness present. No tremor, abnormal muscle tone or pronator drift.      Coordination: Coordination is intact. Coordination normal.      Deep Tendon Reflexes:  Reflexes normal.      Reflex Scores:       Tricep reflexes are 1+ on the right side and 1+ on the left side.       Bicep reflexes are 2+ on the right side and 2+ on the left side.       Brachioradialis reflexes are 2+ on the right side and 2+ on the left side.       Patellar reflexes are 1+ on the right side and 1+ on the left side.       Achilles reflexes are 1+ on the right side and 1+ on the left side.  Psychiatric:         Mood and Affect: Mood normal.         Speech: Speech normal.         Behavior: Behavior is cooperative.         Thought Content: Thought content normal.       Neurological Exam  Mental Status  Awake, alert and oriented to person, place and time. Oriented to person, place, and time. Speech is normal. Language is fluent with no aphasia. Attention and concentration are normal. Fund of knowledge is appropriate for level of education.    Cranial Nerves  CN III, IV, VI: Extraocular movements intact bilaterally.    Motor  Normal muscle bulk throughout. No fasciculations present. Normal muscle tone. Strength is 5/5 in all four extremities except as noted. No pronator drift.                                             Right                     Left   Shoulder abduction               5                          4  Elbow flexion                         5                          5  Elbow extension                    5                          5  Wrist flexion                           5                          5  Wrist extension                      5                          5  Finger flexion                         5                          4  Finger extension                    5                          4  Hand  5/5 on the right; 4/5 on the left. .    Sensory  Normal sensation.    Reflexes                                            Right                      Left  Brachioradialis                    2+                         2+  Biceps                                 2+                          2+  Triceps                                1+                         1+  Patellar                                1+                         1+  Achilles                                1+                         1+    Right pathological reflexes: Yury's absent. Ankle clonus absent.  Left pathological reflexes: Yury's absent. Ankle clonus absent.  Withdraws both feet to plantar stimulation. .    Coordination    Finger-to-nose, rapid alternating movements and heel-to-shin normal bilaterally without dysmetria.No tremor    Gait    Gait not tested due to fall risk.    Assessment & Plan       Hyperlipidemia    Hypertension    GERD (gastroesophageal reflux disease)    CAD (coronary artery disease)    History of coronary artery bypass surgery    Type 2 diabetes mellitus, without long-term current use of insulin    PAF (paroxysmal atrial fibrillation)    Fall    Hyperextension injury of cervical spine, initial encounter    Numbness and tingling of both upper extremities    Cervical stenosis of spinal canal      Problem List Items Addressed This Visit          Unprioritized    * (Principal) RESOLVED: Closed head injury     Other Visit Diagnoses       Fall from standing, initial encounter    -  Primary    Closed fracture of nasal bone, initial encounter        Unable to ambulate        Strain of neck muscle, initial encounter        Multiple skin tears        Relevant Medications    Diclofenac Sodium (VOLTAREN) 1 % gel 2 g             COMORBID CONDITIONS:  Diabetes Type 2, Hypertension, and CAD, Atrial Fibrillation        Suspected hyperextension injury of cervical spine, initial encounter    Fall    Weakness L arm, numbness and tingling of both upper extremities - improved per patient    Degenerative cervical stenosis       PLAN:     This is an 89-year-old male with a history of multiple medical problems.  He has been ambulating with a cane at home for quite a while.  He reports having a fall yesterday after getting  tangled up in the cane.  He denied any syncopal event or dizziness prior to the fall.  He also denied loss of consciousness after the fall.  He felt immediate weakness, tingling and numbness in both upper extremities initially after the fall and was brought to the hospital for further evaluation.  He had significant facial trauma however CT imaging of the head and maxillofacial region revealed no acute intracranial abnormality and only a nasal bone fracture.  Cervical CT revealed degenerative disease of the cervical spine with foraminal narrowing particularly at C5-6 followed by C3-4 on the left and C4-5 on the right.    Given the complaints on presentation and the findings of degenerative canal/foraminal stenosis on imaging there is significant concern for contusion of the cervical cord and possible cervical cord syndrome.  The only way to determine this is to get an MRI of the cervical spine however the patient has refused the MRI due to complaints of claustrophobia.  He states that pre-MRI medications will not work either.  Evaluation by anesthesia to put the patient to sleep for MRI was offered however the patient continues to refuse.  Given that he is not interested in any further exploration of the presenting complaints and also stated that he would not be interested in any surgical interventions, there is little to offer from our standpoint.  I notified the nurse to contact neurosurgery if the patient happens to change his mind and follows through with cervical MRI imaging.  We will be more than happy to re-evaluate at that time if needed.    I discussed the patient's CT radiographic findings, physical exam findings and presenting symptoms with Dr. Irwin who is in agreement with the below recommendations.  I also discussed my recommendations with patient and nursing staff    During patient visit, I utilized appropriate personal protective equipment including gloves and mask.  Mask used was standard  "procedure mask. Appropriate PPE was worn during the entire visit.  Hand hygiene was completed before and after.     Adilene Britt, APRN  10/09/24  10:41 EDT    \"Dictated utilizing Dragon dictation\".   "

## 2024-10-10 ENCOUNTER — READMISSION MANAGEMENT (OUTPATIENT)
Dept: CALL CENTER | Facility: HOSPITAL | Age: 89
End: 2024-10-10
Payer: MEDICARE

## 2024-10-10 VITALS
OXYGEN SATURATION: 97 % | RESPIRATION RATE: 20 BRPM | HEIGHT: 70 IN | TEMPERATURE: 98 F | HEART RATE: 72 BPM | DIASTOLIC BLOOD PRESSURE: 57 MMHG | SYSTOLIC BLOOD PRESSURE: 169 MMHG | WEIGHT: 202.16 LBS | BODY MASS INDEX: 28.94 KG/M2

## 2024-10-10 LAB
ANION GAP SERPL CALCULATED.3IONS-SCNC: 12 MMOL/L (ref 5–15)
BASOPHILS # BLD AUTO: 0.02 10*3/MM3 (ref 0–0.2)
BASOPHILS NFR BLD AUTO: 0.3 % (ref 0–1.5)
BUN SERPL-MCNC: 16 MG/DL (ref 8–23)
BUN/CREAT SERPL: 19.8 (ref 7–25)
CALCIUM SPEC-SCNC: 8.6 MG/DL (ref 8.6–10.5)
CHLORIDE SERPL-SCNC: 96 MMOL/L (ref 98–107)
CO2 SERPL-SCNC: 22 MMOL/L (ref 22–29)
CREAT SERPL-MCNC: 0.81 MG/DL (ref 0.76–1.27)
DEPRECATED RDW RBC AUTO: 46 FL (ref 37–54)
EGFRCR SERPLBLD CKD-EPI 2021: 84.8 ML/MIN/1.73
EOSINOPHIL # BLD AUTO: 0.27 10*3/MM3 (ref 0–0.4)
EOSINOPHIL NFR BLD AUTO: 3.5 % (ref 0.3–6.2)
ERYTHROCYTE [DISTWIDTH] IN BLOOD BY AUTOMATED COUNT: 13.2 % (ref 12.3–15.4)
GLUCOSE BLDC GLUCOMTR-MCNC: 134 MG/DL (ref 70–130)
GLUCOSE BLDC GLUCOMTR-MCNC: 187 MG/DL (ref 70–130)
GLUCOSE SERPL-MCNC: 225 MG/DL (ref 65–99)
HCT VFR BLD AUTO: 37.5 % (ref 37.5–51)
HGB BLD-MCNC: 12.1 G/DL (ref 13–17.7)
IMM GRANULOCYTES # BLD AUTO: 0.03 10*3/MM3 (ref 0–0.05)
IMM GRANULOCYTES NFR BLD AUTO: 0.4 % (ref 0–0.5)
LYMPHOCYTES # BLD AUTO: 1 10*3/MM3 (ref 0.7–3.1)
LYMPHOCYTES NFR BLD AUTO: 12.8 % (ref 19.6–45.3)
MCH RBC QN AUTO: 31.3 PG (ref 26.6–33)
MCHC RBC AUTO-ENTMCNC: 32.3 G/DL (ref 31.5–35.7)
MCV RBC AUTO: 96.9 FL (ref 79–97)
MONOCYTES # BLD AUTO: 0.83 10*3/MM3 (ref 0.1–0.9)
MONOCYTES NFR BLD AUTO: 10.6 % (ref 5–12)
NEUTROPHILS NFR BLD AUTO: 5.67 10*3/MM3 (ref 1.7–7)
NEUTROPHILS NFR BLD AUTO: 72.4 % (ref 42.7–76)
NRBC BLD AUTO-RTO: 0 /100 WBC (ref 0–0.2)
PLATELET # BLD AUTO: 237 10*3/MM3 (ref 140–450)
PMV BLD AUTO: 10.1 FL (ref 6–12)
POTASSIUM SERPL-SCNC: 3.8 MMOL/L (ref 3.5–5.2)
RBC # BLD AUTO: 3.87 10*6/MM3 (ref 4.14–5.8)
SODIUM SERPL-SCNC: 130 MMOL/L (ref 136–145)
WBC NRBC COR # BLD AUTO: 7.82 10*3/MM3 (ref 3.4–10.8)

## 2024-10-10 PROCEDURE — 80048 BASIC METABOLIC PNL TOTAL CA: CPT | Performed by: STUDENT IN AN ORGANIZED HEALTH CARE EDUCATION/TRAINING PROGRAM

## 2024-10-10 PROCEDURE — 97530 THERAPEUTIC ACTIVITIES: CPT

## 2024-10-10 PROCEDURE — 85025 COMPLETE CBC W/AUTO DIFF WBC: CPT | Performed by: STUDENT IN AN ORGANIZED HEALTH CARE EDUCATION/TRAINING PROGRAM

## 2024-10-10 PROCEDURE — 63710000001 INSULIN LISPRO (HUMAN) PER 5 UNITS: Performed by: NURSE PRACTITIONER

## 2024-10-10 PROCEDURE — 82948 REAGENT STRIP/BLOOD GLUCOSE: CPT

## 2024-10-10 RX ORDER — MIRTAZAPINE 15 MG/1
15 TABLET, FILM COATED ORAL NIGHTLY
Qty: 30 TABLET | Refills: 1 | Status: SHIPPED | OUTPATIENT
Start: 2024-10-10

## 2024-10-10 RX ADMIN — PANTOPRAZOLE SODIUM 40 MG: 40 TABLET, DELAYED RELEASE ORAL at 05:30

## 2024-10-10 RX ADMIN — ATENOLOL 12.5 MG: 25 TABLET ORAL at 09:19

## 2024-10-10 RX ADMIN — RANOLAZINE 500 MG: 500 TABLET, FILM COATED, EXTENDED RELEASE ORAL at 09:19

## 2024-10-10 RX ADMIN — DICLOFENAC SODIUM 2 G: 10 GEL TOPICAL at 09:20

## 2024-10-10 RX ADMIN — ASPIRIN 81 MG: 81 TABLET, COATED ORAL at 09:19

## 2024-10-10 RX ADMIN — INSULIN LISPRO 2 UNITS: 100 INJECTION, SOLUTION INTRAVENOUS; SUBCUTANEOUS at 12:09

## 2024-10-10 RX ADMIN — ATORVASTATIN CALCIUM 20 MG: 20 TABLET, FILM COATED ORAL at 09:19

## 2024-10-10 RX ADMIN — CETIRIZINE HYDROCHLORIDE 5 MG: 10 TABLET ORAL at 09:19

## 2024-10-10 RX ADMIN — Medication 10 ML: at 09:20

## 2024-10-10 NOTE — PLAN OF CARE
Goal Outcome Evaluation:  Plan of Care Reviewed With: patient        Progress: no change  Outcome Evaluation: became dizzy this am when getting up to bathroom, used urinal then returned to bed, VSS, NSR, eyes closed at long interval, resting quietly in room

## 2024-10-10 NOTE — DISCHARGE SUMMARY
"    Patient Name: Shaheen Saini  : 1935  MRN: [id number]    Date of Admission: 10/7/2024  Date of Discharge:  10/10/2024  Primary Care Physician: Pal Lawrence MD      Chief Complaint:   Fall      Discharge Diagnoses     Active Hospital Problems    Diagnosis  POA    Hyperextension injury of cervical spine, initial encounter [S19.80XA]  Yes    Numbness and tingling of both upper extremities [R20.0, R20.2]  Yes    Cervical stenosis of spinal canal [M48.02]  Yes    Fall [W19.XXXA]  Yes    Hyponatremia [E87.1]  Yes    Type 2 diabetes mellitus, without long-term current use of insulin [E11.9]  Yes    PAF (paroxysmal atrial fibrillation) [I48.0]  Yes    History of coronary artery bypass surgery [Z95.1]  Not Applicable    Hyperlipidemia [E78.5]  Yes    Hypertension [I10]  Yes    GERD (gastroesophageal reflux disease) [K21.9]  Yes    CAD (coronary artery disease) [I25.10]  Yes      Resolved Hospital Problems    Diagnosis Date Resolved POA    **Closed head injury [S09.90XA] 10/09/2024 Yes        Admitting HPI     \"Mr. Saini is a 89 y.o. male with a history of PAF, diabetes, CAD, hypertension, HLD, recent cholecystectomy that presents to [facility] complaining of injury to the face after a fall prior to arrival. He was found to have nasal bone fracture and was admitted to the hospital for further evaluation and treatment.  Patient states he has been feeling much better, getting his strength back after gallbladder surgery in mid August. He says feeling better is what caused him to fall because he was holding his cane but not utilizing the cane, tripped and lost his balance. Landed on face and right side. He sustained facial bruising and skin abrasions to the right arm. He did not loose consciousness but it was a hard fall that stunned him for a minute. He denies vision disturbance, dizziness, lightheadedness, CP, palpitations or SOA prior to or after the fall. Currently has no vision problems, " "breathing difficulty or headache. His neck, arms and upper body are sore post fall. No hip, knee or back pain. He nearly fell in the ED while trying to walk, fell forward. He says he got up too fast when the nurse was helping him downstairs. He was notably unsteady on his feet for staff. \"    Hospital Course     Pt admitted for mechanical fall, found to have nasal bone fracture which was nondisplaced not requiring any intervention.  He did have some upper extremity weakness for which neurosurgery was consulted.  Patient declined MRI due to claustrophobia.  Neurosurgery thought that he could possibly have a cord contusion given findings of cervical degeneration.  He had slow improvement in his weakness, and given that he did not desire any surgical intervention even if something was found, imaging was deferred.  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  He is evaluated by physical therapy and will discharge home with home health.  He is also on oxazepam as an outpatient for sleep.  Will defer to his PCP but would consider attempting to stop this in this elderly gentleman with fall risk.  Stable for discharge home.    Discharge Plan     Nasal bone fracture and facial injuries status post fall  UE weakness  CT head negative. CT facial bones noting nasal fracture- no intervention indicated  -ENDY consulted; patient declined MRI due to claustrophobia.  His weakness has improved.  Etiology would likely be a cord contusion given he has significant degeneration on prior imaging.  He does not desire any surgical management so deferring MRI is reasonable.  Continue with therapies as OP     Hyponatremia. Improved  -Na 125 > 130  -urine Na c/w SIADH  -switch Paxil to Remeron  -rec repeat labs with PCP in 1wk     PAF  HTN  CAD  HLD  RC: atenolol 12.5 daily   AC: none 2/2 fall risk  ASA, statin, Ranexa     DM2  Resume home meds   "   Anxiety  Oxazepam PRN qhs; not ideal medicine for elderly gentleman with fall risk    Day of Discharge     Physical Exam:  Temp:  [97.8 °F (36.6 °C)-98 °F (36.7 °C)] 98 °F (36.7 °C)  Heart Rate:  [66-72] 72  Resp:  [18-20] 20  BP: (141-169)/(52-57) 169/57  Body mass index is 29.01 kg/m².  Physical Exam  Constitutional:       Appearance: He is ill-appearing.      Comments: Hard of hearing   HENT:      Head:      Comments: Swelling and bruising to nose, orbits, forehead  Pulmonary:      Effort: Pulmonary effort is normal. No respiratory distress.      Breath sounds: No stridor.   Skin:     Coloration: Skin is not pale.   Neurological:      Mental Status: He is alert and oriented to person, place, and time.     Consultants     Consult Orders (all) (From admission, onward)       Start     Ordered    10/08/24 1603  Inpatient Neurosurgery Consult  Once        Specialty:  Neurosurgery  Provider:  Vernon Ruiz MD    10/08/24 1603    10/08/24 1447  Inpatient Spiritual Care Consult  Once        Provider:  (Not yet assigned)    10/08/24 1447    10/08/24 0226  [facility] (on-call MD unless specified) Details  Once        Specialty:  Hospitalist  Provider:  (Not yet assigned)    10/08/24 0225                  Procedures     * Surgery not found *      Imaging Results (All)       Procedure Component Value Units Date/Time    CT Facial Bones Without Contrast [[id number]] Collected: 10/08/24 0148     Updated: 10/08/24 0148    Narrative:        Patient: ALEN WU  Time Out: 01:47  Exam(s): CT FACIAL Without Contrast     EXAM:    CT Head and Maxillofacial Without Intravenous Contrast    CLINICAL HISTORY:     Reason for exam: trauma.    TECHNIQUE:    Axial computed tomography images of the head brain and face without   intravenous contrast.  CTDI is 24.27 mGy and DLP is 491.9 mGy-cm.  This   CT exam was performed according to the principle of ALARA (As Low As   Reasonably Achievable) by using one or more of the following dose    reduction techniques: automated exposure control, adjustment of the mA   and or kV according to patient size, and or use of iterative   reconstruction technique.    COMPARISON:    No relevant prior studies available.    FINDINGS:    Bones joints:  Acute nasal bone fracture.  Remaining facial bones are   intact     Soft tissues:  Unremarkable.    Sinuses:  No acute sinusitis.    Mastoid air cells:  No mastoid effusion.    Orbits:  Unremarkable.    IMPRESSION:         Acute nasal bone fracture.  Remaining facial bones are intact       Impression:          Electronically signed by Misbah Garcia MD on 10-08-24 at 0147    CT Head Without Contrast [[id number]] Collected: 10/08/24 0147     Updated: 10/08/24 0147    Narrative:        Patient: ALEN WU  Time Out: 01:46  Exam(s): CT HEAD Without Contrast     EXAM:    CT Head Without Intravenous Contrast    CLINICAL HISTORY:     Reason for exam: fall with facial injury.    TECHNIQUE:    Axial computed tomography images of the head brain without intravenous   contrast.  CTDI is 55.7 mGy and DLP is 991.9 mGy-cm.  This CT exam was   performed according to the principle of ALARA (As Low As Reasonably   Achievable) by using one or more of the following dose reduction   techniques: automated exposure control, adjustment of the mA and or kV   according to patient size, and or use of iterative reconstruction   technique.    COMPARISON:    No relevant prior studies available.    FINDINGS:    Brain:  No hemorrhage, herniation, or mass effect.  Chronic   microvascular ischemic changes.    Ventricles:  No hydrocephalus.  Age related cerebral volume loss.    Bones joints:  Unremarkable.    Soft tissues:  Unremarkable.    Sinuses:  No air fluid levels.    Mastoid air cells:  Clear.    IMPRESSION:         No acute hemorrhage, hydrocephalus, or mass effect.      Impression:          Electronically signed by Misbah Garcia MD on 10-08-24 at 0146    CT Cervical Spine Without Contrast  [[id number]] Collected: 10/08/24 0132     Updated: 10/08/24 0132    Narrative:        Patient: ALEN WU  Time Out: 01:31  Exam(s): CT C SPINE     EXAM:    CT Cervical Spine Without Intravenous Contrast    CLINICAL HISTORY:     Reason for exam: fall with facial injury and tingling in hands..    TECHNIQUE:    Axial computed tomography images of the cervical spine without   intravenous contrast.  CTDI is 17.94 mGy and DLP is 440.4 mGy-cm.  This   CT exam was performed according to the principle of ALARA (As Low As   Reasonably Achievable) by using one or more of the following dose   reduction techniques: automated exposure control, adjustment of the mA   and or kV according to patient size, and or use of iterative   reconstruction technique.    COMPARISON:    No relevant prior studies available.    FINDINGS:    Vertebrae:  No acute fracture.    Discs spinal canal neural foramina:  degenerative changes.    Soft tissues:  No prevertebral swelling.    IMPRESSION:         No acute fracture or subluxation.      Impression:          Electronically signed by Misbah Garcia MD on 10-08-24 at 0131          Results for orders placed during the hospital encounter of 10/20/22    Duplex Venous Lower Extremity - Right CAR    Interpretation Summary    Normal right lower extremity venous duplex scan.    Results for orders placed during the hospital encounter of 08/17/24    Adult Transthoracic Echo Complete W/ Cont if Necessary Per Protocol    Interpretation Summary    Left ventricular systolic function is normal. Left ventricular ejection fraction appears to be 61 - 65%.    Left ventricular diastolic function was normal.    Mild aortic valve stenosis is present.  Mild aortic regurgitation.    Peak velocity of the flow distal to the aortic valve is 241 cm/s. Aortic valve maximum pressure gradient is 23 mmHg. Aortic valve mean pressure gradient is 13 mmHg. Aortic valve dimensionless index is 0.5 .    Estimated right ventricular  "systolic pressure from tricuspid regurgitation is mildly elevated (35-45 mmHg). Calculated right ventricular systolic pressure from tricuspid regurgitation is 41 mmHg.    Pertinent Labs     Results from last 7 days   Lab Units 10/10/24  0915 10/09/24  0438 10/08/24  0624 10/07/24  2357   WBC 10*3/mm3 7.82 12.71* 8.68 11.16*   HEMOGLOBIN g/dL 12.1* 12.1* 12.4* 13.1   PLATELETS 10*3/mm3 237 252 260 268     Results from last 7 days   Lab Units 10/10/24  0915 10/09/24  1125 10/09/24  0438 10/08/24  0624   SODIUM mmol/L 130* 126* 125* 130*   POTASSIUM mmol/L 3.8 4.2 3.8 4.1   CHLORIDE mmol/L 96* 95* 94* 98   CO2 mmol/L 22.0 23.0 24.0 24.0   BUN mg/dL 16 16 16 13   CREATININE mg/dL 0.81 0.77 0.84 0.65*   GLUCOSE mg/dL 225* 153* 116* 180*   EGFR mL/min/1.73 84.8 86.1 83.9 90.6     Results from last 7 days   Lab Units 10/08/24  0624 10/07/24  2357   ALBUMIN g/dL 3.6 3.6   BILIRUBIN mg/dL 0.4 0.4   ALK PHOS U/L 80 76   AST (SGOT) U/L 23 17   ALT (SGPT) U/L 21 20     Results from last 7 days   Lab Units 10/10/24  0915 10/09/24  1125 10/09/24  0438 10/08/24  0624 10/07/24  2357   CALCIUM mg/dL 8.6 8.5* 8.6 9.1 8.9   ALBUMIN g/dL  --   --   --  3.6 3.6         Results from last 7 days   Lab Units 10/09/24  1334   SODIUM UR mmol/L 34   OSMOLALITY UR mOsm/kg 478         Invalid input(s): \"LDLCALC\"          Test Results Pending at Discharge       Discharge Details        Discharge Medications        New Medications        Instructions Start Date   mirtazapine 15 MG tablet  Commonly known as: Remeron   15 mg, Oral, Nightly             Continue These Medications        Instructions Start Date   Accu-Chek Guide w/Device kit   1 Device, Does not apply, Daily      Accu-Chek Softclix Lancets lancets   1 each, Other, Daily      aspirin 81 MG EC tablet   81 mg, Oral, Daily      atenolol 25 MG tablet  Commonly known as: TENORMIN   12.5 mg, Oral, Daily      atorvastatin 20 MG tablet  Commonly known as: LIPITOR   20 mg, Oral, Daily    "   esomeprazole 20 MG capsule  Commonly known as: nexIUM   20 mg, Oral, Every Morning Before Breakfast      fexofenadine-pseudoephedrine  MG per 12 hr tablet  Commonly known as: ALLEGRA-D   1 tablet, Oral, 2 Times Daily      glipizide 5 MG ER tablet  Commonly known as: GLUCOTROL XL   5 mg, Oral, Daily      glucose blood test strip   1 strip, Other, Daily      multivitamin with minerals tablet tablet   1 tablet, Oral, Daily      nitroglycerin 0.4 MG SL tablet  Commonly known as: NITROSTAT   0.4 mg, Sublingual, Every 5 Minutes PRN, Take no more than 3 doses in 15 minutes.       oxazepam 15 MG capsule  Commonly known as: SERAX   15 mg, Oral, Daily      ranolazine 500 MG 12 hr tablet  Commonly known as: RANEXA   500 mg, Oral, 2 Times Daily      vitamin C 500 MG tablet  Commonly known as: ASCORBIC ACID   500 mg, Oral             Stop These Medications      PARoxetine 10 MG tablet  Commonly known as: PAXIL              No Known Allergies    Discharge Disposition:  [place]-Health Care [facility] [place]      Discharge Diet:  Diet Order   Procedures    Diet: Diabetic, Cardiac; Healthy Heart (2-3 Na+); Consistent Carbohydrate; Fluid Consistency: Thin (IDDSI 0)       Discharge Activity:   Activity Instructions       Activity as Tolerated              CODE STATUS:    Code Status and Medical Interventions: CPR (Attempt to Resuscitate); Full Support   Ordered at: 10/08/24 0313     Code Status (Patient has no pulse and is not breathing):    CPR (Attempt to Resuscitate)     Medical Interventions (Patient has pulse or is breathing):    Full Support       No future appointments.  Additional Instructions for the Follow-ups that You Need to Schedule       Ambulatory Referral to Home Health (Hospital)   As directed      Face to Face Visit Date: 10/10/2024   Follow-up provider for Plan of Care?: I treated the patient in an acute care facility and will not continue treatment after discharge.   Follow-up provider: SHRUTI SIMS [[id number]]    Reason/Clinical Findings: age related debility, cervical stenosis   Describe mobility limitations that make leaving home difficult: age related debility, cervical stenosis   Nursing/Therapeutic Services Requested: Skilled Nursing Physical Therapy   Skilled nursing orders: Medication education CHF management Wound care dressing/changes   PT orders: Therapeutic exercise Transfer training   Frequency: 1 Week 1               Follow-up Information       Pal Lawrence MD .    Specialty: Family Medicine  Contact information:  [address] [id number] [phone]                             Additional Instructions for the Follow-ups that You Need to Schedule       Ambulatory Referral to Home Health ([facility])   As directed      Face to Face Visit Date: 10/10/2024   Follow-up provider for Plan of Care?: I treated the patient in an acute care facility and will not continue treatment after discharge.   Follow-up provider: PAL LAWRENCE [[id number]]   Reason/Clinical Findings: age related debility, cervical stenosis   Describe mobility limitations that make leaving home difficult: age related debility, cervical stenosis   Nursing/Therapeutic Services Requested: Skilled Nursing Physical Therapy   Skilled nursing orders: Medication education CHF management Wound care dressing/changes   PT orders: Therapeutic exercise Transfer training   Frequency: 1 Week 1            Time Spent on Discharge:  Greater than 30 minutes spent on discharge management including final examination, discussion of hospital stay and patient education, preparation of records, medication reconciliation, follow up planning      Ryan Moreland MD  [facility]  10/10/24  10:12 EDT

## 2024-10-10 NOTE — CASE MANAGEMENT/SOCIAL WORK
Case Management Discharge Note      Final Note: Pt discharged home with [facility], 10/10/24…....Amaya S/RN CM    Provided Post Acute Provider List?: N/A  N/A Provider List Comment: Declined; patient's family requests [facility].    Selected Continued Care - Discharged on 10/10/2024 Admission date: 10/7/2024 - Discharge disposition: Home-Health Care Svc      Destination    No services have been selected for the patient.                Durable Medical Equipment    No services have been selected for the patient.                Dialysis/Infusion    No services have been selected for the patient.                Home Medical Care    No services have been selected for the patient.                Therapy    No services have been selected for the patient.                Community Resources    No services have been selected for the patient.                Community & DME    No services have been selected for the patient.                    Selected Continued Care - Prior Encounters Includes continued care and service providers with selected services from prior encounters from 7/9/2024 to 10/10/2024      Discharged on 8/21/2024 Admission date: 8/17/2024 - Discharge disposition: Home-Health Care [facility] [place]      Home Medical Care       Service Provider Selected Services Address Phone Fax Patient Preferred    [facility] [id number] [address] [place] [address] [phone] --                               Final Discharge Disposition Code: 06 - home with home health care

## 2024-10-10 NOTE — THERAPY TREATMENT NOTE
Patient Name: Shaheen Saini  : 1935    MRN: [id number]                              Today's Date: 10/10/2024       Admit Date: 10/7/2024    Visit Dx:     ICD-10-CM ICD-9-CM   1. Fall from standing, initial encounter  W19.XXXA E888.9   2. Closed head injury, initial encounter  S09.90XA 959.01   3. Closed fracture of nasal bone, initial encounter  S02.2XXA 802.0   4. Unable to ambulate  R26.2 719.7   5. Strain of neck muscle, initial encounter  S16.1XXA 847.0   6. Multiple skin tears  T14.8XXA 879.8   7. Cervical stenosis of spinal canal  M48.02 723.0   8. Numbness and tingling of both upper extremities  R20.0 782.0    R20.2      Patient Active Problem List   Diagnosis    Diabetes mellitus    Hyperlipidemia    Hypertension    GERD (gastroesophageal reflux disease)    CAD (coronary artery disease)    History of echocardiogram    History of cardiac cath    History of coronary artery bypass surgery    Mild aortic insufficiency    Cellulitis of right lower extremity    Type 2 diabetes mellitus, without long-term current use of insulin    PAF (paroxysmal atrial fibrillation)    Gangrenous cholecystitis    Hyponatremia    Fall    Hyperextension injury of cervical spine, initial encounter    Numbness and tingling of both upper extremities    Cervical stenosis of spinal canal     Past Medical History:   Diagnosis Date    CAD (coronary artery disease)     Cholelithiasis 2024    Removed Gallbladder also stones    Diabetes mellitus     GERD (gastroesophageal reflux disease)     History of cardiac cath     12 - LVEF 65%. 1+ MV insufficiency. Three vessel CAD. Three of four bypass grafts remain patent. The vein graft to the third marginal branch is a previously documented occlusion. The distal circumflex is unchanged from angiogram 09. 09 - LVEF 65%.Three vessel CAD s/p CABG. One fraft to marginal branch is occluded, the rest of the grafts are patent. The marginal branch is diffusely dis    History of  echocardiogram     1/9/18 - LA mildly dilated. Normal LV function. Mild MR. Mild-moderate TR. Mild AR. 7/22/2012 - EF 60-65%. Moderate MI, TI, and PI. Mild AI.    Hyperlipidemia     Hypertension     Paroxysmal atrial fibrillation      Past Surgical History:   Procedure Laterality Date    CHOLECYSTECTOMY WITH INTRAOPERATIVE CHOLANGIOGRAM N/A 08/18/2024    Procedure: CHOLECYSTECTOMY LAPAROSCOPIC INTRAOPERATIVE CHOLANGIOGRAM;  Surgeon: Inocencia Vieira MD;  Location: [facility];  Service: General;  Laterality: N/A;    CORONARY ARTERY BYPASS GRAFT  09/06/2005    CABG x4. Internal mammary artery to LAD, vein graft to diagonal artery, vein graft to OM, vein graft to posterior descending artery.    EYE SURGERY      Cataracts removed      General Information       Row Name 10/10/24 1402          Physical Therapy Time and Intention    Document Type therapy note (daily note)  -CW     Mode of Treatment physical therapy;individual therapy  -CW       Row Name 10/10/24 1402          General Information    Patient Profile Reviewed yes  -CW     Existing Precautions/Restrictions fall  -CW       Row Name 10/10/24 1402          Cognition    Orientation Status (Cognition) oriented x 3  -CW       Row Name 10/10/24 1402          Safety Issues, Functional Mobility    Impairments Affecting Function (Mobility) balance;endurance/activity tolerance;strength  -CW               User Key  (r) = Recorded By, (t) = Taken By, (c) = Cosigned By      Initials Name Provider Type    CW Cindi Arango, NATASHA Physical Therapist                   Mobility       Row Name 10/10/24 1508          Bed Mobility    Bed Mobility supine-sit  -CW     Supine-Sit [place] (Bed Mobility) standby assist  -CW       Row Name 10/10/24 150          Sit-Stand Transfer    Sit-Stand [place] (Transfers) contact guard;minimum assist (75% patient effort)  -CW     Assistive Device (Sit-Stand Transfers) walker, front-wheeled  -CW     Comment, (Sit-Stand Transfer)  STS x2  -CW       Row Name 10/10/24 1509          Gait/Stairs (Locomotion)    [place] Level (Gait) contact guard;verbal cues  -CW     Assistive Device (Gait) walker, front-wheeled  -CW     Distance in Feet (Gait) 10  x2  -CW     Deviations/Abnormal Patterns (Gait) santhosh decreased;gait speed decreased;stride length decreased  -CW     Bilateral Gait Deviations forward flexed posture  -CW     Handrail Location (Stairs) left side (ascending)  -CW     Number of Steps (Stairs) 3  -CW     Ascending Technique (Stairs) step-to-step  -CW     Descending Technique (Stairs) step-to-step  -CW     Comment, (Gait/Stairs) ascends/descends steps sideways, B hands on L handrail  -CW               User Key  (r) = Recorded By, (t) = Taken By, (c) = Cosigned By      Initials Name Provider Type    Cindi Sy PT Physical Therapist                   Obj/Interventions       Row Name 10/10/24 1511          Balance    Balance Assessment standing static balance;standing dynamic balance  -CW     Static Standing Balance contact guard  -CW     Dynamic Standing Balance contact guard  -CW     Position/Device Used, Standing Balance supported;walker, front-wheeled  -CW               User Key  (r) = Recorded By, (t) = Taken By, (c) = Cosigned By      Initials Name Provider Type    Cindi Sy PT Physical Therapist                   Goals/Plan    No documentation.                  Clinical Impression       Row Name 10/10/24 1511          Pain    Pretreatment Pain Rating 0/10 - no pain  -CW     Posttreatment Pain Rating 0/10 - no pain  -CW       Row Name 10/10/24 1511          Plan of Care Review    Plan of Care Reviewed With patient  -CW     Outcome Evaluation Pt seen for PT before d/c to practice stairs as daughter was concerned. Pt in bed upon arrival and agreeable to mobilize. Pt completed  bed mobility sba, STS cga and ambulated a few feet to the chair. Pt wheeled down to the stairwell and completed 3 steps cga using  handrail. Educated to lead up with his strong R leg with stairs. No balance or safety deficits observed. Daughter present for stair training. educated daughter on how to don the gait belt. Plans home later this PM.  -CW       Row Name 10/10/24 1511          Vital Signs    O2 Delivery Pre Treatment room air  -CW       Row Name 10/10/24 1511          Positioning and Restraints    Pre-Treatment Position in bed  -CW     Post Treatment Position chair  -CW     In Chair reclined;call light within reach;encouraged to call for assist;exit alarm on;notified nsg;legs elevated  -CW               User Key  (r) = Recorded By, (t) = Taken By, (c) = Cosigned By      Initials Name Provider Type    Cindi Sy PT Physical Therapist                   Outcome Measures       Row Name 10/10/24 1514          How much help from another person do you currently need...    Turning from your back to your side while in flat bed without using bedrails? 4  -CW     Moving from lying on back to sitting on the side of a flat bed without bedrails? 3  -CW     Moving to and from a bed to a chair (including a wheelchair)? 3  -CW     Standing up from a chair using your arms (e.g., wheelchair, bedside chair)? 3  -CW     Climbing 3-5 steps with a railing? 3  -CW     To walk in hospital room? 3  -CW     AM-PAC 6 Clicks Score (PT) 19  -CW     Highest Level of Mobility Goal 6 --> Walk 10 steps or more  -       Row Name 10/10/24 1516          Functional Assessment    Outcome Measure Options AM-PAC 6 Clicks Basic Mobility (PT)  -CW               User Key  (r) = Recorded By, (t) = Taken By, (c) = Cosigned By      Initials Name Provider Type    Cindi Sy PT Physical Therapist                                 Physical Therapy Education       Title: PT OT SLP Therapies (In Progress)       Topic: Physical Therapy (In Progress)       Point: Mobility training (Done)       Learning Progress Summary             Patient Acceptance, E, VU,NR by HOLLY  at 10/9/2024 1030    Acceptance, E,TB,D, VU,NR by  at 10/8/2024 1720                         Point: Home exercise program (Not Started)       Learner Progress:  Not documented in this visit.              Point: Body mechanics (Done)       Learning Progress Summary             Patient Acceptance, E, VU,NR by  at 10/9/2024 1030    Acceptance, E,TB,D, VU,NR by  at 10/8/2024 1720                         Point: Precautions (Done)       Learning Progress Summary             Patient Acceptance, E,TB,D, VU,NR by  at 10/8/2024 1720                                         User Key       Initials Effective Dates Name Provider Type Discipline     12/13/22 -  Cindi Arango PT Physical Therapist PT     04/08/22 -  Noelle Colbert PT Physical Therapist PT                  PT Recommendation and Plan     Plan of Care Reviewed With: patient  Outcome Evaluation: Pt seen for PT before d/c to practice stairs as daughter was concerned. Pt in bed upon arrival and agreeable to mobilize. Pt completed  bed mobility sba, STS cga and ambulated a few feet to the chair. Pt wheeled down to the stairwell and completed 3 steps cga using handrail. Educated to lead up with his strong R leg with stairs. No balance or safety deficits observed. Daughter present for stair training. educated daughter on how to don the gait belt. Plans home later this PM.     Time Calculation:         PT Charges       Row Name 10/10/24 1402             Time Calculation    Start Time 1304  -CW      Stop Time 1321  -CW      Time Calculation (min) 17 min  -CW      PT Received On 10/10/24  -      PT - Next Appointment 10/11/24  -                User Key  (r) = Recorded By, (t) = Taken By, (c) = Cosigned By      Initials Name Provider Type     Cindi Arango, PT Physical Therapist                  Therapy Charges for Today       Code Description Service Date Service Provider Modifiers Qty    [id number]  PT THERAPEUTIC ACT EA 15 MIN 10/9/2024  Cindi Arango, PT GP 1    [id number]  PT THERAPEUTIC ACT EA 15 MIN 10/10/2024 Cindi Arango, PT GP 1            PT G-Codes  Outcome Measure Options: AM-PAC 6 Clicks Basic Mobility (PT)  AM-PAC 6 Clicks Score (PT): 19  AM-PAC 6 Clicks Score (OT): 21  PT Discharge Summary  Anticipated Discharge Disposition (PT): home with 24/7 care, home with home health    Cindi Arango PT  10/10/2024

## 2024-10-10 NOTE — NURSING NOTE
1300  Dressings changed and cleaned with NS to [facility]. 2 skin tears right elbow and right hand bruised. Dressed with oil emersion dressing, guaze 4x4 and kerlix wrap.

## 2024-10-10 NOTE — PLAN OF CARE
Goal Outcome Evaluation:  Plan of Care Reviewed With: patient           Outcome Evaluation: Pt seen for PT before d/c to practice stairs as daughter was concerned. Pt in bed upon arrival and agreeable to mobilize. Pt completed  bed mobility sba, STS cga and ambulated a few feet to the chair. Pt wheeled down to the stairwell and completed 3 steps cga using handrail. Educated to lead up with his strong R leg with stairs. No balance or safety deficits observed. Daughter present for stair training. educated daughter on how to don the gait belt. Plans home later this PM.      Anticipated Discharge Disposition (PT): home with 24/7 care, home with home health

## 2024-10-10 NOTE — CASE MANAGEMENT/SOCIAL WORK
Continued Stay Note  [facility]     Patient Name: Shaheen Saini  MRN: [id number]  Today's Date: 10/10/2024    Admit Date: 10/7/2024    Plan: Plan home with daughter and [facility] .  YASMIN Ramirez RN   Discharge Plan       Row Name 10/10/24 1152       Plan    Plan Plan home with daughter and [facility] .  YASMIN Ramirez RN    Patient/Family in Agreement with Plan yes    Plan Comments Spoke with pt and pt's daughter  (Tarah Saini ([phone]) at bedside.  Pt's daughter requesting PT work with pt and steps.  PT notified.  Marika  ( [phone]) with [facility]  notified pt was discharging today.  Pt's daughter to assist pt at home and transport pt home.  Plan home with daughter and [facility] .  YASMIN Ramirez RN                   Discharge Codes    No documentation.                 Expected Discharge Date and Time       Expected Discharge Date Expected Discharge Time    Oct 10, 2024               Deya Ramirez RN

## 2024-10-11 NOTE — OUTREACH NOTE
Prep Survey      Flowsheet Row Responses   [facility] patient discharged from? [place]   Is LACE score < 7 ? No   Eligibility Readm Mgmt   Discharge diagnosis Hyperextension injury of cervical spine-   Fall -   Does the patient have one of the following disease processes/diagnoses(primary or secondary)? Other   Does the patient have Home health ordered? Yes   What is the [facility] health agency?  [facility]   Is there a DME ordered? No   Prep survey completed? Yes            RAFAEL SUAREZ - Registered Nurse

## 2024-10-15 ENCOUNTER — READMISSION MANAGEMENT (OUTPATIENT)
Dept: CALL CENTER | Facility: HOSPITAL | Age: 89
End: 2024-10-15
Payer: MEDICARE

## 2024-10-15 NOTE — OUTREACH NOTE
Medical Week 1 Survey      Flowsheet Row Responses   [facility] patient discharged from? [place]   Does the patient have one of the following disease processes/diagnoses(primary or secondary)? Other   Week 1 attempt successful? Yes   Call start time 0813   Call end time 0821   Person spoke with today (if not patient) and relationship Patient and daughter, Tarah.   Meds reviewed with patient/caregiver? Yes   Is the patient having any side effects they believe may be caused by any medication additions or changes? No   Does the patient have all medications ordered at discharge? Yes   Is the patient taking all medications as directed (includes completed medication regime)? Yes   Medication comments States his PCP restarted his Paxil, and discontinued his mirtazapine. Daughter states patient also taking Kaopectate for diarrhea.   Does the patient have a primary care provider?  Yes   Does the patient have an appointment with their PCP within 7 days of discharge? Yes   Has the patient kept scheduled appointments due by today? Yes   Has home health visited the patient within 72 hours of discharge? Yes   DME comments Uses a walker.   Psychosocial issues? No   Did the patient receive a copy of their discharge instructions? Yes   Nursing interventions Reviewed instructions with patient   What is the patient's perception of their health status since discharge? Improving  [Daughter states patient has started with diarrhea, and now taking Kaopectate prescribed by PCP.]   Is the patient/caregiver able to teach back signs and symptoms related to disease process for when to call PCP? Yes   Is the patient/caregiver able to teach back signs and symptoms related to disease process for when to call 911? Yes   Is the patient/caregiver able to teach back the hierarchy of who to call/visit for symptoms/problems? PCP, Specialist, Home health nurse, Urgent Care, ED, 911 Yes   If the patient is a current smoker, are they able to teach  back resources for cessation? Not a smoker   Week 1 call completed? Yes   Graduated Yes   Would this patient benefit from a Referral to [facility]? No   Is the patient interested in additional calls from an ambulatory ? No   Graduated/Revoked comments Patient and daughter state patient is improving-denies any need for further f/u calls.   Wrap up additional comments Patient states is improving. Using walker. States feeling stronger, but has started with diarrhea. States now taking kaopectate recommended by his PCP. Reports diarrhea has improved also. Denies any needs or concerns today.   Call end time 0821            Germaine SALGADO - Registered Nurse

## 2024-10-20 ENCOUNTER — HOSPITAL ENCOUNTER (EMERGENCY)
Facility: HOSPITAL | Age: 89
Discharge: HOME | End: 2024-10-20
Attending: STUDENT IN AN ORGANIZED HEALTH CARE EDUCATION/TRAINING PROGRAM | Admitting: STUDENT IN AN ORGANIZED HEALTH CARE EDUCATION/TRAINING PROGRAM
Payer: MEDICARE

## 2024-10-20 ENCOUNTER — APPOINTMENT (OUTPATIENT)
Dept: GENERAL RADIOLOGY | Facility: HOSPITAL | Age: 89
End: 2024-10-20
Payer: MEDICARE

## 2024-10-20 ENCOUNTER — NURSE TRIAGE (OUTPATIENT)
Dept: CALL CENTER | Facility: HOSPITAL | Age: 89
End: 2024-10-20
Payer: MEDICARE

## 2024-10-20 VITALS
RESPIRATION RATE: 18 BRPM | TEMPERATURE: 97.7 F | HEART RATE: 78 BPM | OXYGEN SATURATION: 96 % | SYSTOLIC BLOOD PRESSURE: 155 MMHG | DIASTOLIC BLOOD PRESSURE: 55 MMHG

## 2024-10-20 DIAGNOSIS — M25.421 EFFUSION OF RIGHT ELBOW: ICD-10-CM

## 2024-10-20 DIAGNOSIS — M25.521 RIGHT ELBOW PAIN: Primary | ICD-10-CM

## 2024-10-20 PROCEDURE — 73070 X-RAY EXAM OF ELBOW: CPT

## 2024-10-20 PROCEDURE — 73090 X-RAY EXAM OF FOREARM: CPT

## 2024-10-20 PROCEDURE — 99284 EMERGENCY DEPT VISIT MOD MDM: CPT | Performed by: STUDENT IN AN ORGANIZED HEALTH CARE EDUCATION/TRAINING PROGRAM

## 2024-10-20 PROCEDURE — 99283 EMERGENCY DEPT VISIT LOW MDM: CPT

## 2024-10-20 NOTE — FSED PROVIDER NOTE
Subjective   History of Present Illness  Pt is a 89 y.o. male with PMH as listed who presents for   Chief Complaint   Patient presents with    Elbow Injury     Right elbow pain after falling 2 weeks ago       Patient is a pleasant 89-year-old gentleman who presents for right elbow pain and swelling for the past 2 weeks.  He had a mechanical fall 2 weeks ago was seen at [facility], had CTs performed but at that time did not have any imaging of his elbow.  States that his swelling of the elbow has increased over the past couple of weeks and he persistently has pain with movement both extension and flexion.  The swelling is only over the dorsum of the proximal right forearm extending to the elbow, no swelling over the olecranon process, skin tears are healing well with no significant erythema no warmth and no purulent drainage.    Review of Systems    Past Medical History:   Diagnosis Date    CAD (coronary artery disease)     Cholelithiasis 8/18/2024    Removed Gallbladder also stones    Diabetes mellitus     GERD (gastroesophageal reflux disease)     History of cardiac cath     7/23/12 - LVEF 65%. 1+ MV insufficiency. Three vessel CAD. Three of four bypass grafts remain patent. The vein graft to the third marginal branch is a previously documented occlusion. The distal circumflex is unchanged from angiogram 5/4/09. 5/4/09 - LVEF 65%.Three vessel CAD s/p CABG. One fraft to marginal branch is occluded, the rest of the grafts are patent. The marginal branch is diffusely dis    History of echocardiogram     1/9/18 - LA mildly dilated. Normal LV function. Mild MR. Mild-moderate TR. Mild AR. 7/22/2012 - EF 60-65%. Moderate MI, TI, and PI. Mild AI.    Hyperlipidemia     Hypertension     Paroxysmal atrial fibrillation        No Known Allergies    Past Surgical History:   Procedure Laterality Date    CHOLECYSTECTOMY WITH INTRAOPERATIVE CHOLANGIOGRAM N/A 08/18/2024    Procedure: CHOLECYSTECTOMY LAPAROSCOPIC  INTRAOPERATIVE CHOLANGIOGRAM;  Surgeon: Inocencia Vieira MD;  Location: [facility] MAIN OR;  Service: General;  Laterality: N/A;    CORONARY ARTERY BYPASS GRAFT  09/06/2005    CABG x4. Internal mammary artery to LAD, vein graft to diagonal artery, vein graft to OM, vein graft to posterior descending artery.    EYE SURGERY      Cataracts removed       Family History   Problem Relation Age of Onset    Heart disease Mother     Arthritis Mother        Social History     Socioeconomic History    Marital status:    Tobacco Use    Smoking status: Never    Smokeless tobacco: Never   Vaping Use    Vaping status: Never Used   Substance and Sexual Activity    Alcohol use: No    Drug use: Never    Sexual activity: Defer           Objective   Physical Exam  Constitutional:       Appearance: Normal appearance.   HENT:      Head: Normocephalic and atraumatic.      Mouth/Throat:      Mouth: Mucous membranes are moist.      Pharynx: Oropharynx is clear.   Eyes:      Conjunctiva/sclera: Conjunctivae normal.   Cardiovascular:      Rate and Rhythm: Normal rate.   Pulmonary:      Effort: Pulmonary effort is normal.   Abdominal:      General: Abdomen is flat.   Musculoskeletal:      Cervical back: Neck supple.      Comments: The swelling is only over the dorsum of the proximal right forearm extending to the elbow, no swelling over the olecranon process, skin tears are healing well with no significant erythema no warmth and no purulent drainage.  NVM is intact distally with normal sensation full range of motion of right elbow and wrist though with pain to the elbow and less than 2-second cap refill all fingers of right hand.  2+ pulses right upper extremity.  No circumferential swelling no concern for acute DVT at this time given swelling is only over areas where he sustained contusions and pain with certain movements   Skin:     General: Skin is warm and dry.   Neurological:      Mental Status: He is alert.   Psychiatric:          Mood and Affect: Mood normal.         Procedures           ED Course  ED Course as of 10/20/24 1209   Sun Oct 20, 2024   1050 Patient is a pleasant 89-year-old gentleman who presents for right elbow pain and swelling for the past 2 weeks.  He had a mechanical fall 2 weeks ago was seen at [facility], had CTs performed but at that time did not have any imaging of his elbow.  States that his swelling of the elbow has increased over the past couple of weeks and he persistently has pain with movement both extension and flexion.  The swelling is only over the dorsum of the proximal right forearm extending to the elbow, no swelling over the olecranon process, skin tears are healing well with no significant erythema no warmth and no purulent drainage.  NVM is intact distally with normal sensation full range of motion of right elbow and wrist though with pain to the elbow and less than 2-second cap refill all fingers of right hand.  2+ pulses right upper extremity.  No circumferential swelling no concern for acute DVT at this time given swelling is only over areas where he sustained contusions and pain with certain movements, most likely musculoskeletal in nature, will obtain x-ray to eval for underlying fracture given his persistent discomfort.  Offered Tylenol, declined. [JF]   1108 Patient's daughter asked me to come back into the room to discuss similar concerns, he has had some generalized weakness since he was in the hospital, has had difficulty getting back to his baseline, discussed that this is not unexpected after hospital stay but encourage patient to follow-up with PCP tomorrow for recheck given that his PCP is already seen him since he was discharged from the hospital last time and may be able to provide a better comparison and recommendations, and including potential outpatient physical therapy.  Patient's daughter also concerned about patient having diarrhea for the past 2 weeks since he was in the  hospital, this also has been managed by patient's PCP and is not a new symptom, he and his daughter are concerned about low sodium because he had low sodium while he was in the hospital last time, requesting this be rechecked today.  Given his diarrhea for the past 2 weeks we will check renal function and electrolytes including sodium with CBC and BMP. [JF]   1139 Patient now is refusing any blood work to evaluate for his sodium, discussed that we are unable to fully evaluate for any electrolyte abnormalities without this, his vitals are stable however and he feels well and does not want any blood work.  Feel this is okay given patient otherwise being at his baseline.  As for his diarrheal illness discussed with him and family at bedside benefit of likely obtaining stool culture, discussed with PCP regarding sending this off for assessment.  Given supplies to collect stool sample at home. [JF]   1208 X-ray shows effusion, no acute fracture.  Given patient's persistent pain however will place in sling for comfort and have him follow-up with PCP and with orthopedic surgery for reassessment, may benefit from outpatient MRI.  Patient understands and agrees with plan of care.  All questions answered. [JF]      ED Course User Index  [JF] Addison Fontenot MD                                           Medical Decision Making  My differential diagnosis of the upper extremity pain or injury includes but is not limited to contusions of the shoulder, forearm, arm, wrist, elbow or hand, dislocations of shoulder, elbow, wrist, digits, nursemaid's elbow (age-appropriate), shoulder sprain, elbow sprain, wrist sprain, digit sprain, shoulder strain, arm strain, forearm strain, elbow strain, wrist strain, hand sprain, digit strain, lacerations of the upper extremity, fractures both closed and open of clavicle, scapula, humerus, radius, ulna, bones of the wrist, hands and digits, cellulitis or abscess, cervical radiculopathy, radial  nerve palsy, neurogenic upper extremity pain, angina equivalent, SVT, DVT, arterial occlusion, compartment syndrome.      Problems Addressed:  Effusion of right elbow: complicated acute illness or injury  Right elbow pain: complicated acute illness or injury    Amount and/or Complexity of Data Reviewed  Radiology: ordered and independent interpretation performed. Decision-making details documented in ED Course.        Final diagnoses:   Right elbow pain   Effusion of right elbow       ED Disposition  ED Disposition       ED Disposition   Discharge    Condition   Stable    Comment   --               Pal Lawrence MD  [address] [address]  [phone]    Call in 1 day  For re-evaluation    [facility]  [facility] [facility] [address]  [facility] [address] [phone]  Call in 1 day  to establish care, For re-evaluation         Medication List      No changes were made to your prescriptions during this visit.

## 2024-10-21 NOTE — TELEPHONE ENCOUNTER
"Daughter of patient called regarding how to store stool specimen until can be turned into lab. NCC confirmed with [facility] that stool specimen needs to be refrigerated. Daughter verbalizes understanding.     Reason for Disposition   Information only question and nurse able to answer    Additional Information   Negative: Nursing judgment   Negative: Nursing judgment   Negative: Nursing judgment   Negative: Nursing judgment    Answer Assessment - Initial Assessment Questions  1. REASON FOR CALL or QUESTION: \"What is your reason for calling today?\" or \"How can I best help you?\" or \"What question do you have that I can help answer?\"      How store stool specimen after collection? Will be taking specimen to PCP office    Protocols used: No Protocol Available - Information Only-ADULT-[place]    "

## 2025-08-02 ENCOUNTER — HOSPITAL ENCOUNTER (INPATIENT)
Facility: HOSPITAL | Age: 89
LOS: 9 days | Discharge: HOSPICE | DRG: 645 | End: 2025-08-12
Attending: EMERGENCY MEDICINE | Admitting: HOSPITALIST
Payer: MEDICARE

## 2025-08-02 DIAGNOSIS — R42 INTERMITTENT LIGHTHEADEDNESS: ICD-10-CM

## 2025-08-02 DIAGNOSIS — R53.1 GENERALIZED WEAKNESS: Primary | ICD-10-CM

## 2025-08-02 DIAGNOSIS — F41.9 ANXIETY: ICD-10-CM

## 2025-08-02 DIAGNOSIS — T88.7XXA MEDICATION SIDE EFFECT: ICD-10-CM

## 2025-08-02 DIAGNOSIS — E87.1 HYPONATREMIA: ICD-10-CM

## 2025-08-02 LAB
ALBUMIN SERPL-MCNC: 3.5 G/DL (ref 3.5–5.2)
ALBUMIN/GLOB SERPL: 0.9 G/DL
ALP SERPL-CCNC: 126 U/L (ref 39–117)
ALT SERPL W P-5'-P-CCNC: 37 U/L (ref 1–41)
ANION GAP SERPL CALCULATED.3IONS-SCNC: 11 MMOL/L (ref 5–15)
AST SERPL-CCNC: 42 U/L (ref 1–40)
BACTERIA UR QL AUTO: ABNORMAL /HPF
BASOPHILS # BLD AUTO: 0.04 10*3/MM3 (ref 0–0.2)
BASOPHILS NFR BLD AUTO: 0.4 % (ref 0–1.5)
BILIRUB SERPL-MCNC: 0.5 MG/DL (ref 0–1.2)
BILIRUB UR QL STRIP: NEGATIVE
BUN SERPL-MCNC: 16 MG/DL (ref 8–23)
BUN/CREAT SERPL: 25 (ref 7–25)
CALCIUM SPEC-SCNC: 9.6 MG/DL (ref 8.6–10.5)
CHLORIDE SERPL-SCNC: 90 MMOL/L (ref 98–107)
CK SERPL-CCNC: 126 U/L (ref 20–200)
CLARITY UR: CLEAR
CO2 SERPL-SCNC: 26 MMOL/L (ref 22–29)
COLOR UR: YELLOW
CREAT SERPL-MCNC: 0.64 MG/DL (ref 0.76–1.27)
DEPRECATED RDW RBC AUTO: 45.1 FL (ref 37–54)
EGFRCR SERPLBLD CKD-EPI 2021: 90.5 ML/MIN/1.73
EOSINOPHIL # BLD AUTO: 0.1 10*3/MM3 (ref 0–0.4)
EOSINOPHIL NFR BLD AUTO: 1.1 % (ref 0.3–6.2)
ERYTHROCYTE [DISTWIDTH] IN BLOOD BY AUTOMATED COUNT: 13.2 % (ref 12.3–15.4)
GLOBULIN UR ELPH-MCNC: 3.7 GM/DL
GLUCOSE BLDC GLUCOMTR-MCNC: 83 MG/DL (ref 65–99)
GLUCOSE SERPL-MCNC: 79 MG/DL (ref 65–99)
GLUCOSE UR STRIP-MCNC: NEGATIVE MG/DL
HCT VFR BLD AUTO: 36.8 % (ref 37.5–51)
HGB BLD-MCNC: 12.2 G/DL (ref 13–17.7)
HGB UR QL STRIP.AUTO: ABNORMAL
HOLD SPECIMEN: NORMAL
HOLD SPECIMEN: NORMAL
HYALINE CASTS UR QL AUTO: ABNORMAL /LPF
IMM GRANULOCYTES # BLD AUTO: 0.08 10*3/MM3 (ref 0–0.05)
IMM GRANULOCYTES NFR BLD AUTO: 0.9 % (ref 0–0.5)
KETONES UR QL STRIP: NEGATIVE
LEUKOCYTE ESTERASE UR QL STRIP.AUTO: NEGATIVE
LYMPHOCYTES # BLD AUTO: 1.42 10*3/MM3 (ref 0.7–3.1)
LYMPHOCYTES NFR BLD AUTO: 15.9 % (ref 19.6–45.3)
MAGNESIUM SERPL-MCNC: 1.8 MG/DL (ref 1.6–2.4)
MCH RBC QN AUTO: 30.8 PG (ref 26.6–33)
MCHC RBC AUTO-ENTMCNC: 33.2 G/DL (ref 31.5–35.7)
MCV RBC AUTO: 92.9 FL (ref 79–97)
MONOCYTES # BLD AUTO: 1.26 10*3/MM3 (ref 0.1–0.9)
MONOCYTES NFR BLD AUTO: 14.1 % (ref 5–12)
NEUTROPHILS NFR BLD AUTO: 6.01 10*3/MM3 (ref 1.7–7)
NEUTROPHILS NFR BLD AUTO: 67.6 % (ref 42.7–76)
NITRITE UR QL STRIP: NEGATIVE
NRBC BLD AUTO-RTO: 0 /100 WBC (ref 0–0.2)
OSMOLALITY UR: 318 MOSM/KG
PH UR STRIP.AUTO: 6 [PH] (ref 5–8)
PLATELET # BLD AUTO: 393 10*3/MM3 (ref 140–450)
PMV BLD AUTO: 8.9 FL (ref 6–12)
POTASSIUM SERPL-SCNC: 4.6 MMOL/L (ref 3.5–5.2)
PROT SERPL-MCNC: 7.2 G/DL (ref 6–8.5)
PROT UR QL STRIP: NEGATIVE
RBC # BLD AUTO: 3.96 10*6/MM3 (ref 4.14–5.8)
RBC # UR STRIP: ABNORMAL /HPF
REF LAB TEST METHOD: ABNORMAL
SODIUM SERPL-SCNC: 127 MMOL/L (ref 136–145)
SODIUM UR-SCNC: 36 MMOL/L
SP GR UR STRIP: 1.01 (ref 1–1.03)
SQUAMOUS #/AREA URNS HPF: ABNORMAL /HPF
TROPONIN T SERPL HS-MCNC: 24 NG/L
TSH SERPL DL<=0.05 MIU/L-ACNC: 4.83 UIU/ML (ref 0.27–4.2)
UROBILINOGEN UR QL STRIP: ABNORMAL
WBC # UR STRIP: ABNORMAL /HPF
WBC NRBC COR # BLD AUTO: 8.91 10*3/MM3 (ref 3.4–10.8)
WHOLE BLOOD HOLD COAG: NORMAL

## 2025-08-02 PROCEDURE — G0378 HOSPITAL OBSERVATION PER HR: HCPCS

## 2025-08-02 PROCEDURE — 82948 REAGENT STRIP/BLOOD GLUCOSE: CPT

## 2025-08-02 PROCEDURE — 82607 VITAMIN B-12: CPT | Performed by: STUDENT IN AN ORGANIZED HEALTH CARE EDUCATION/TRAINING PROGRAM

## 2025-08-02 PROCEDURE — 85025 COMPLETE CBC W/AUTO DIFF WBC: CPT | Performed by: EMERGENCY MEDICINE

## 2025-08-02 PROCEDURE — P9612 CATHETERIZE FOR URINE SPEC: HCPCS

## 2025-08-02 PROCEDURE — 93010 ELECTROCARDIOGRAM REPORT: CPT | Performed by: INTERNAL MEDICINE

## 2025-08-02 PROCEDURE — 25810000003 SODIUM CHLORIDE 0.9 % SOLUTION: Performed by: EMERGENCY MEDICINE

## 2025-08-02 PROCEDURE — 82746 ASSAY OF FOLIC ACID SERUM: CPT | Performed by: STUDENT IN AN ORGANIZED HEALTH CARE EDUCATION/TRAINING PROGRAM

## 2025-08-02 PROCEDURE — 51798 US URINE CAPACITY MEASURE: CPT

## 2025-08-02 PROCEDURE — 84300 ASSAY OF URINE SODIUM: CPT | Performed by: HOSPITALIST

## 2025-08-02 PROCEDURE — 80053 COMPREHEN METABOLIC PANEL: CPT | Performed by: EMERGENCY MEDICINE

## 2025-08-02 PROCEDURE — 84484 ASSAY OF TROPONIN QUANT: CPT | Performed by: EMERGENCY MEDICINE

## 2025-08-02 PROCEDURE — 81001 URINALYSIS AUTO W/SCOPE: CPT | Performed by: EMERGENCY MEDICINE

## 2025-08-02 PROCEDURE — 99285 EMERGENCY DEPT VISIT HI MDM: CPT

## 2025-08-02 PROCEDURE — 83735 ASSAY OF MAGNESIUM: CPT | Performed by: EMERGENCY MEDICINE

## 2025-08-02 PROCEDURE — 84443 ASSAY THYROID STIM HORMONE: CPT | Performed by: HOSPITALIST

## 2025-08-02 PROCEDURE — 82550 ASSAY OF CK (CPK): CPT | Performed by: EMERGENCY MEDICINE

## 2025-08-02 PROCEDURE — 83935 ASSAY OF URINE OSMOLALITY: CPT | Performed by: HOSPITALIST

## 2025-08-02 PROCEDURE — 93005 ELECTROCARDIOGRAM TRACING: CPT | Performed by: EMERGENCY MEDICINE

## 2025-08-02 RX ORDER — SODIUM CHLORIDE 0.9 % (FLUSH) 0.9 %
10 SYRINGE (ML) INJECTION AS NEEDED
Status: DISCONTINUED | OUTPATIENT
Start: 2025-08-02 | End: 2025-08-12 | Stop reason: HOSPADM

## 2025-08-02 RX ORDER — PAROXETINE 10 MG/1
10 TABLET, FILM COATED ORAL EVERY MORNING
COMMUNITY
End: 2025-08-12 | Stop reason: HOSPADM

## 2025-08-02 RX ORDER — SODIUM CHLORIDE 0.9 % (FLUSH) 0.9 %
10 SYRINGE (ML) INJECTION EVERY 12 HOURS SCHEDULED
Status: DISCONTINUED | OUTPATIENT
Start: 2025-08-02 | End: 2025-08-12 | Stop reason: HOSPADM

## 2025-08-02 RX ORDER — BISACODYL 5 MG/1
5 TABLET, DELAYED RELEASE ORAL DAILY PRN
Status: DISCONTINUED | OUTPATIENT
Start: 2025-08-02 | End: 2025-08-09

## 2025-08-02 RX ORDER — METOCLOPRAMIDE 5 MG/1
5 TABLET ORAL 4 TIMES DAILY
COMMUNITY
End: 2025-08-12 | Stop reason: HOSPADM

## 2025-08-02 RX ORDER — SODIUM CHLORIDE 9 MG/ML
40 INJECTION, SOLUTION INTRAVENOUS AS NEEDED
Status: DISCONTINUED | OUTPATIENT
Start: 2025-08-02 | End: 2025-08-12 | Stop reason: HOSPADM

## 2025-08-02 RX ORDER — AMOXICILLIN 250 MG
2 CAPSULE ORAL 2 TIMES DAILY PRN
Status: DISCONTINUED | OUTPATIENT
Start: 2025-08-02 | End: 2025-08-12 | Stop reason: HOSPADM

## 2025-08-02 RX ORDER — BISACODYL 10 MG
10 SUPPOSITORY, RECTAL RECTAL DAILY PRN
Status: DISCONTINUED | OUTPATIENT
Start: 2025-08-02 | End: 2025-08-09

## 2025-08-02 RX ORDER — POLYETHYLENE GLYCOL 3350 17 G/17G
17 POWDER, FOR SOLUTION ORAL DAILY PRN
Status: DISCONTINUED | OUTPATIENT
Start: 2025-08-02 | End: 2025-08-12 | Stop reason: HOSPADM

## 2025-08-02 RX ADMIN — SODIUM CHLORIDE 500 ML: 9 INJECTION, SOLUTION INTRAVENOUS at 20:57

## 2025-08-03 DIAGNOSIS — R63.4 WEIGHT LOSS: ICD-10-CM

## 2025-08-03 DIAGNOSIS — R11.0 NAUSEA: Primary | ICD-10-CM

## 2025-08-03 PROBLEM — R13.10 DYSPHAGIA: Status: ACTIVE | Noted: 2025-08-03

## 2025-08-03 LAB
ANION GAP SERPL CALCULATED.3IONS-SCNC: 12 MMOL/L (ref 5–15)
BASOPHILS # BLD AUTO: 0.02 10*3/MM3 (ref 0–0.2)
BASOPHILS NFR BLD AUTO: 0.3 % (ref 0–1.5)
BUN SERPL-MCNC: 13 MG/DL (ref 8–23)
BUN/CREAT SERPL: 23.6 (ref 7–25)
CALCIUM SPEC-SCNC: 8.7 MG/DL (ref 8.6–10.5)
CHLORIDE SERPL-SCNC: 96 MMOL/L (ref 98–107)
CO2 SERPL-SCNC: 22 MMOL/L (ref 22–29)
CORTIS SERPL-MCNC: 19.3 MCG/DL
CREAT SERPL-MCNC: 0.55 MG/DL (ref 0.76–1.27)
DEPRECATED RDW RBC AUTO: 46.9 FL (ref 37–54)
EGFRCR SERPLBLD CKD-EPI 2021: 94.7 ML/MIN/1.73
EOSINOPHIL # BLD AUTO: 0.08 10*3/MM3 (ref 0–0.4)
EOSINOPHIL NFR BLD AUTO: 1.2 % (ref 0.3–6.2)
ERYTHROCYTE [DISTWIDTH] IN BLOOD BY AUTOMATED COUNT: 13.5 % (ref 12.3–15.4)
ERYTHROCYTE [SEDIMENTATION RATE] IN BLOOD: 84 MM/HR (ref 0–20)
FOLATE SERPL-MCNC: 12.5 NG/ML (ref 4.78–24.2)
GLUCOSE BLDC GLUCOMTR-MCNC: 121 MG/DL (ref 65–99)
GLUCOSE BLDC GLUCOMTR-MCNC: 138 MG/DL (ref 65–99)
GLUCOSE BLDC GLUCOMTR-MCNC: 140 MG/DL (ref 65–99)
GLUCOSE BLDC GLUCOMTR-MCNC: 154 MG/DL (ref 65–99)
GLUCOSE BLDC GLUCOMTR-MCNC: 158 MG/DL (ref 65–99)
GLUCOSE SERPL-MCNC: 119 MG/DL (ref 65–99)
HCT VFR BLD AUTO: 35.9 % (ref 37.5–51)
HGB BLD-MCNC: 11.6 G/DL (ref 13–17.7)
IMM GRANULOCYTES # BLD AUTO: 0.06 10*3/MM3 (ref 0–0.05)
IMM GRANULOCYTES NFR BLD AUTO: 0.9 % (ref 0–0.5)
LYMPHOCYTES # BLD AUTO: 1.07 10*3/MM3 (ref 0.7–3.1)
LYMPHOCYTES NFR BLD AUTO: 16.6 % (ref 19.6–45.3)
MCH RBC QN AUTO: 30.4 PG (ref 26.6–33)
MCHC RBC AUTO-ENTMCNC: 32.3 G/DL (ref 31.5–35.7)
MCV RBC AUTO: 94.2 FL (ref 79–97)
MONOCYTES # BLD AUTO: 1.1 10*3/MM3 (ref 0.1–0.9)
MONOCYTES NFR BLD AUTO: 17.1 % (ref 5–12)
NEUTROPHILS NFR BLD AUTO: 4.11 10*3/MM3 (ref 1.7–7)
NEUTROPHILS NFR BLD AUTO: 63.9 % (ref 42.7–76)
NRBC BLD AUTO-RTO: 0 /100 WBC (ref 0–0.2)
PLATELET # BLD AUTO: 382 10*3/MM3 (ref 140–450)
PMV BLD AUTO: 9.3 FL (ref 6–12)
POTASSIUM SERPL-SCNC: 4 MMOL/L (ref 3.5–5.2)
QT INTERVAL: 388 MS
QTC INTERVAL: 442 MS
RBC # BLD AUTO: 3.81 10*6/MM3 (ref 4.14–5.8)
RPR SER QL: NORMAL
SODIUM SERPL-SCNC: 130 MMOL/L (ref 136–145)
VIT B12 BLD-MCNC: 549 PG/ML (ref 211–946)
WBC NRBC COR # BLD AUTO: 6.44 10*3/MM3 (ref 3.4–10.8)

## 2025-08-03 PROCEDURE — 82525 ASSAY OF COPPER: CPT | Performed by: STUDENT IN AN ORGANIZED HEALTH CARE EDUCATION/TRAINING PROGRAM

## 2025-08-03 PROCEDURE — 86592 SYPHILIS TEST NON-TREP QUAL: CPT | Performed by: STUDENT IN AN ORGANIZED HEALTH CARE EDUCATION/TRAINING PROGRAM

## 2025-08-03 PROCEDURE — 82784 ASSAY IGA/IGD/IGG/IGM EACH: CPT | Performed by: STUDENT IN AN ORGANIZED HEALTH CARE EDUCATION/TRAINING PROGRAM

## 2025-08-03 PROCEDURE — 97161 PT EVAL LOW COMPLEX 20 MIN: CPT

## 2025-08-03 PROCEDURE — 99222 1ST HOSP IP/OBS MODERATE 55: CPT | Performed by: INTERNAL MEDICINE

## 2025-08-03 PROCEDURE — 63710000001 INSULIN LISPRO (HUMAN) PER 5 UNITS: Performed by: HOSPITALIST

## 2025-08-03 PROCEDURE — 83655 ASSAY OF LEAD: CPT | Performed by: STUDENT IN AN ORGANIZED HEALTH CARE EDUCATION/TRAINING PROGRAM

## 2025-08-03 PROCEDURE — 84165 PROTEIN E-PHORESIS SERUM: CPT | Performed by: STUDENT IN AN ORGANIZED HEALTH CARE EDUCATION/TRAINING PROGRAM

## 2025-08-03 PROCEDURE — 99222 1ST HOSP IP/OBS MODERATE 55: CPT | Performed by: STUDENT IN AN ORGANIZED HEALTH CARE EDUCATION/TRAINING PROGRAM

## 2025-08-03 PROCEDURE — 82948 REAGENT STRIP/BLOOD GLUCOSE: CPT

## 2025-08-03 PROCEDURE — 97530 THERAPEUTIC ACTIVITIES: CPT

## 2025-08-03 PROCEDURE — 92610 EVALUATE SWALLOWING FUNCTION: CPT

## 2025-08-03 PROCEDURE — 82948 REAGENT STRIP/BLOOD GLUCOSE: CPT | Performed by: HOSPITALIST

## 2025-08-03 PROCEDURE — 83825 ASSAY OF MERCURY: CPT | Performed by: STUDENT IN AN ORGANIZED HEALTH CARE EDUCATION/TRAINING PROGRAM

## 2025-08-03 PROCEDURE — 86334 IMMUNOFIX E-PHORESIS SERUM: CPT | Performed by: STUDENT IN AN ORGANIZED HEALTH CARE EDUCATION/TRAINING PROGRAM

## 2025-08-03 PROCEDURE — 82595 ASSAY OF CRYOGLOBULIN: CPT | Performed by: STUDENT IN AN ORGANIZED HEALTH CARE EDUCATION/TRAINING PROGRAM

## 2025-08-03 PROCEDURE — 80048 BASIC METABOLIC PNL TOTAL CA: CPT | Performed by: HOSPITALIST

## 2025-08-03 PROCEDURE — 84155 ASSAY OF PROTEIN SERUM: CPT | Performed by: STUDENT IN AN ORGANIZED HEALTH CARE EDUCATION/TRAINING PROGRAM

## 2025-08-03 PROCEDURE — 85045 AUTOMATED RETICULOCYTE COUNT: CPT | Performed by: INTERNAL MEDICINE

## 2025-08-03 PROCEDURE — 82533 TOTAL CORTISOL: CPT | Performed by: HOSPITALIST

## 2025-08-03 PROCEDURE — 82175 ASSAY OF ARSENIC: CPT | Performed by: STUDENT IN AN ORGANIZED HEALTH CARE EDUCATION/TRAINING PROGRAM

## 2025-08-03 PROCEDURE — 85025 COMPLETE CBC W/AUTO DIFF WBC: CPT | Performed by: HOSPITALIST

## 2025-08-03 PROCEDURE — 85652 RBC SED RATE AUTOMATED: CPT | Performed by: STUDENT IN AN ORGANIZED HEALTH CARE EDUCATION/TRAINING PROGRAM

## 2025-08-03 RX ORDER — DIAZEPAM 10 MG/2ML
2.5 INJECTION, SOLUTION INTRAMUSCULAR; INTRAVENOUS ONCE AS NEEDED
Status: COMPLETED | OUTPATIENT
Start: 2025-08-03 | End: 2025-08-04

## 2025-08-03 RX ORDER — CETIRIZINE HYDROCHLORIDE 10 MG/1
10 TABLET ORAL DAILY
Status: DISCONTINUED | OUTPATIENT
Start: 2025-08-03 | End: 2025-08-12 | Stop reason: HOSPADM

## 2025-08-03 RX ORDER — PAROXETINE 20 MG/1
10 TABLET, FILM COATED ORAL EVERY MORNING
Status: DISCONTINUED | OUTPATIENT
Start: 2025-08-03 | End: 2025-08-03

## 2025-08-03 RX ORDER — DEXTROSE MONOHYDRATE 25 G/50ML
25 INJECTION, SOLUTION INTRAVENOUS
Status: DISCONTINUED | OUTPATIENT
Start: 2025-08-03 | End: 2025-08-12 | Stop reason: HOSPADM

## 2025-08-03 RX ORDER — ATENOLOL 25 MG/1
12.5 TABLET ORAL NIGHTLY
Status: DISCONTINUED | OUTPATIENT
Start: 2025-08-03 | End: 2025-08-12 | Stop reason: HOSPADM

## 2025-08-03 RX ORDER — RANOLAZINE 500 MG/1
500 TABLET, EXTENDED RELEASE ORAL 2 TIMES DAILY
Status: DISCONTINUED | OUTPATIENT
Start: 2025-08-03 | End: 2025-08-09

## 2025-08-03 RX ORDER — ATORVASTATIN CALCIUM 20 MG/1
20 TABLET, FILM COATED ORAL NIGHTLY
Status: DISCONTINUED | OUTPATIENT
Start: 2025-08-03 | End: 2025-08-07

## 2025-08-03 RX ORDER — NICOTINE POLACRILEX 4 MG
15 LOZENGE BUCCAL
Status: DISCONTINUED | OUTPATIENT
Start: 2025-08-03 | End: 2025-08-12 | Stop reason: HOSPADM

## 2025-08-03 RX ORDER — IBUPROFEN 600 MG/1
1 TABLET ORAL
Status: DISCONTINUED | OUTPATIENT
Start: 2025-08-03 | End: 2025-08-12 | Stop reason: HOSPADM

## 2025-08-03 RX ORDER — POLYETHYLENE GLYCOL 3350 17 G/17G
17 POWDER, FOR SOLUTION ORAL DAILY
Status: DISCONTINUED | OUTPATIENT
Start: 2025-08-03 | End: 2025-08-12 | Stop reason: HOSPADM

## 2025-08-03 RX ORDER — ATENOLOL 25 MG/1
12.5 TABLET ORAL NIGHTLY
Status: DISCONTINUED | OUTPATIENT
Start: 2025-08-04 | End: 2025-08-03

## 2025-08-03 RX ORDER — PANTOPRAZOLE SODIUM 40 MG/1
40 TABLET, DELAYED RELEASE ORAL
Status: DISCONTINUED | OUTPATIENT
Start: 2025-08-03 | End: 2025-08-09

## 2025-08-03 RX ORDER — OXAZEPAM 15 MG/1
15 CAPSULE ORAL NIGHTLY
Status: DISCONTINUED | OUTPATIENT
Start: 2025-08-03 | End: 2025-08-10

## 2025-08-03 RX ORDER — ASPIRIN 81 MG/1
81 TABLET ORAL DAILY
Status: DISCONTINUED | OUTPATIENT
Start: 2025-08-03 | End: 2025-08-09

## 2025-08-03 RX ORDER — INSULIN LISPRO 100 [IU]/ML
2-7 INJECTION, SOLUTION INTRAVENOUS; SUBCUTANEOUS
Status: DISCONTINUED | OUTPATIENT
Start: 2025-08-03 | End: 2025-08-07

## 2025-08-03 RX ADMIN — OXAZEPAM 15 MG: 15 CAPSULE, GELATIN COATED ORAL at 21:31

## 2025-08-03 RX ADMIN — Medication 10 ML: at 08:38

## 2025-08-03 RX ADMIN — ATORVASTATIN CALCIUM 20 MG: 20 TABLET, FILM COATED ORAL at 20:49

## 2025-08-03 RX ADMIN — ASPIRIN 81 MG: 81 TABLET, COATED ORAL at 07:56

## 2025-08-03 RX ADMIN — PANTOPRAZOLE SODIUM 40 MG: 40 TABLET, DELAYED RELEASE ORAL at 06:16

## 2025-08-03 RX ADMIN — ATENOLOL 12.5 MG: 25 TABLET ORAL at 20:50

## 2025-08-03 RX ADMIN — CETIRIZINE HYDROCHLORIDE 10 MG: 10 TABLET, FILM COATED ORAL at 07:55

## 2025-08-03 RX ADMIN — Medication 10 ML: at 20:55

## 2025-08-03 RX ADMIN — INSULIN LISPRO 2 UNITS: 100 INJECTION, SOLUTION INTRAVENOUS; SUBCUTANEOUS at 17:05

## 2025-08-03 RX ADMIN — RANOLAZINE 500 MG: 500 TABLET, FILM COATED, EXTENDED RELEASE ORAL at 07:55

## 2025-08-03 RX ADMIN — Medication 30 G: at 20:49

## 2025-08-03 RX ADMIN — PAROXETINE HYDROCHLORIDE 10 MG: 20 TABLET, FILM COATED ORAL at 06:16

## 2025-08-03 RX ADMIN — RANOLAZINE 500 MG: 500 TABLET, FILM COATED, EXTENDED RELEASE ORAL at 20:49

## 2025-08-03 RX ADMIN — POLYETHYLENE GLYCOL 3350 17 G: 17 POWDER, FOR SOLUTION ORAL at 12:12

## 2025-08-03 RX ADMIN — Medication 10 ML: at 00:26

## 2025-08-04 ENCOUNTER — APPOINTMENT (OUTPATIENT)
Dept: MRI IMAGING | Facility: HOSPITAL | Age: 89
DRG: 645 | End: 2025-08-04
Payer: MEDICARE

## 2025-08-04 LAB
ANION GAP SERPL CALCULATED.3IONS-SCNC: 8.5 MMOL/L (ref 5–15)
BUN SERPL-MCNC: 16 MG/DL (ref 8–23)
BUN/CREAT SERPL: 24.6 (ref 7–25)
CALCIUM SPEC-SCNC: 9.2 MG/DL (ref 8.6–10.5)
CHLORIDE SERPL-SCNC: 93 MMOL/L (ref 98–107)
CO2 SERPL-SCNC: 26.5 MMOL/L (ref 22–29)
CREAT SERPL-MCNC: 0.65 MG/DL (ref 0.76–1.27)
EGFRCR SERPLBLD CKD-EPI 2021: 90.1 ML/MIN/1.73
FERRITIN SERPL-MCNC: 918 NG/ML (ref 30–400)
GEN 5 1HR TROPONIN T REFLEX: 31 NG/L
GLUCOSE BLDC GLUCOMTR-MCNC: 143 MG/DL (ref 65–99)
GLUCOSE BLDC GLUCOMTR-MCNC: 156 MG/DL (ref 65–99)
GLUCOSE BLDC GLUCOMTR-MCNC: 190 MG/DL (ref 65–99)
GLUCOSE BLDC GLUCOMTR-MCNC: 218 MG/DL (ref 65–99)
GLUCOSE SERPL-MCNC: 135 MG/DL (ref 65–99)
IRON 24H UR-MRATE: 36 MCG/DL (ref 59–158)
IRON SATN MFR SERPL: 18 % (ref 20–50)
MAGNESIUM SERPL-MCNC: 1.9 MG/DL (ref 1.6–2.4)
POTASSIUM SERPL-SCNC: 4.5 MMOL/L (ref 3.5–5.2)
QT INTERVAL: 372 MS
QTC INTERVAL: 458 MS
RETICS # AUTO: 0.08 10*6/MM3 (ref 0.02–0.13)
RETICS/RBC NFR AUTO: 2.3 % (ref 0.7–1.9)
SODIUM SERPL-SCNC: 128 MMOL/L (ref 136–145)
TIBC SERPL-MCNC: 203 MCG/DL (ref 298–536)
TRANSFERRIN SERPL-MCNC: 136 MG/DL (ref 200–360)
TROPONIN T % DELTA: 0
TROPONIN T NUMERIC DELTA: 0 NG/L
TROPONIN T SERPL HS-MCNC: 31 NG/L
TROPONIN T SERPL HS-MCNC: 31 NG/L

## 2025-08-04 PROCEDURE — 63710000001 INSULIN LISPRO (HUMAN) PER 5 UNITS: Performed by: HOSPITALIST

## 2025-08-04 PROCEDURE — 93005 ELECTROCARDIOGRAM TRACING: CPT | Performed by: INTERNAL MEDICINE

## 2025-08-04 PROCEDURE — 84484 ASSAY OF TROPONIN QUANT: CPT | Performed by: INTERNAL MEDICINE

## 2025-08-04 PROCEDURE — 83735 ASSAY OF MAGNESIUM: CPT | Performed by: INTERNAL MEDICINE

## 2025-08-04 PROCEDURE — 83540 ASSAY OF IRON: CPT | Performed by: INTERNAL MEDICINE

## 2025-08-04 PROCEDURE — 97165 OT EVAL LOW COMPLEX 30 MIN: CPT

## 2025-08-04 PROCEDURE — 84466 ASSAY OF TRANSFERRIN: CPT | Performed by: INTERNAL MEDICINE

## 2025-08-04 PROCEDURE — 70551 MRI BRAIN STEM W/O DYE: CPT

## 2025-08-04 PROCEDURE — 99232 SBSQ HOSP IP/OBS MODERATE 35: CPT | Performed by: PHYSICIAN ASSISTANT

## 2025-08-04 PROCEDURE — 80048 BASIC METABOLIC PNL TOTAL CA: CPT | Performed by: HOSPITALIST

## 2025-08-04 PROCEDURE — 82948 REAGENT STRIP/BLOOD GLUCOSE: CPT

## 2025-08-04 PROCEDURE — 25010000002 DIAZEPAM PER 5 MG: Performed by: STUDENT IN AN ORGANIZED HEALTH CARE EDUCATION/TRAINING PROGRAM

## 2025-08-04 PROCEDURE — 93010 ELECTROCARDIOGRAM REPORT: CPT | Performed by: INTERNAL MEDICINE

## 2025-08-04 PROCEDURE — 82948 REAGENT STRIP/BLOOD GLUCOSE: CPT | Performed by: HOSPITALIST

## 2025-08-04 PROCEDURE — 97116 GAIT TRAINING THERAPY: CPT

## 2025-08-04 PROCEDURE — 82728 ASSAY OF FERRITIN: CPT | Performed by: INTERNAL MEDICINE

## 2025-08-04 RX ORDER — TORSEMIDE 20 MG/1
10 TABLET ORAL ONCE
Status: DISCONTINUED | OUTPATIENT
Start: 2025-08-04 | End: 2025-08-04

## 2025-08-04 RX ORDER — TORSEMIDE 20 MG/1
20 TABLET ORAL ONCE
Status: COMPLETED | OUTPATIENT
Start: 2025-08-04 | End: 2025-08-04

## 2025-08-04 RX ORDER — NITROGLYCERIN 0.4 MG/1
TABLET SUBLINGUAL
Status: COMPLETED
Start: 2025-08-04 | End: 2025-08-04

## 2025-08-04 RX ADMIN — ATENOLOL 12.5 MG: 25 TABLET ORAL at 21:06

## 2025-08-04 RX ADMIN — Medication 30 G: at 09:11

## 2025-08-04 RX ADMIN — TORSEMIDE 20 MG: 20 TABLET ORAL at 11:24

## 2025-08-04 RX ADMIN — RANOLAZINE 500 MG: 500 TABLET, FILM COATED, EXTENDED RELEASE ORAL at 21:06

## 2025-08-04 RX ADMIN — DIAZEPAM 2.5 MG: 5 INJECTION INTRAMUSCULAR; INTRAVENOUS at 09:27

## 2025-08-04 RX ADMIN — Medication 30 G: at 21:06

## 2025-08-04 RX ADMIN — Medication 10 ML: at 09:08

## 2025-08-04 RX ADMIN — CETIRIZINE HYDROCHLORIDE 10 MG: 10 TABLET, FILM COATED ORAL at 09:12

## 2025-08-04 RX ADMIN — RANOLAZINE 500 MG: 500 TABLET, FILM COATED, EXTENDED RELEASE ORAL at 09:12

## 2025-08-04 RX ADMIN — Medication 10 ML: at 21:06

## 2025-08-04 RX ADMIN — INSULIN LISPRO 3 UNITS: 100 INJECTION, SOLUTION INTRAVENOUS; SUBCUTANEOUS at 22:19

## 2025-08-04 RX ADMIN — POLYETHYLENE GLYCOL 3350 17 G: 17 POWDER, FOR SOLUTION ORAL at 11:24

## 2025-08-04 RX ADMIN — OXAZEPAM 15 MG: 15 CAPSULE, GELATIN COATED ORAL at 21:06

## 2025-08-04 RX ADMIN — INSULIN LISPRO 2 UNITS: 100 INJECTION, SOLUTION INTRAVENOUS; SUBCUTANEOUS at 17:59

## 2025-08-04 RX ADMIN — PANTOPRAZOLE SODIUM 40 MG: 40 TABLET, DELAYED RELEASE ORAL at 09:12

## 2025-08-04 RX ADMIN — ATORVASTATIN CALCIUM 20 MG: 20 TABLET, FILM COATED ORAL at 21:06

## 2025-08-04 RX ADMIN — INSULIN LISPRO 2 UNITS: 100 INJECTION, SOLUTION INTRAVENOUS; SUBCUTANEOUS at 11:30

## 2025-08-04 RX ADMIN — ASPIRIN 81 MG: 81 TABLET, COATED ORAL at 09:13

## 2025-08-04 RX ADMIN — NITROGLYCERIN: 0.4 TABLET SUBLINGUAL at 15:25

## 2025-08-05 LAB
ALBUMIN SERPL ELPH-MCNC: 2.7 G/DL (ref 2.9–4.4)
ALBUMIN/GLOB SERPL: 0.8 {RATIO} (ref 0.7–1.7)
ALPHA1 GLOB SERPL ELPH-MCNC: 0.4 G/DL (ref 0–0.4)
ALPHA2 GLOB SERPL ELPH-MCNC: 1.1 G/DL (ref 0.4–1)
ANION GAP SERPL CALCULATED.3IONS-SCNC: 11 MMOL/L (ref 5–15)
B-GLOBULIN SERPL ELPH-MCNC: 0.9 G/DL (ref 0.7–1.3)
BUN SERPL-MCNC: 43 MG/DL (ref 8–23)
BUN/CREAT SERPL: 64.2 (ref 7–25)
CALCIUM SPEC-SCNC: 9.8 MG/DL (ref 8.6–10.5)
CHLORIDE SERPL-SCNC: 93 MMOL/L (ref 98–107)
CO2 SERPL-SCNC: 26 MMOL/L (ref 22–29)
CREAT SERPL-MCNC: 0.67 MG/DL (ref 0.76–1.27)
DEPRECATED RDW RBC AUTO: 44.9 FL (ref 37–54)
EGFRCR SERPLBLD CKD-EPI 2021: 89.2 ML/MIN/1.73
ERYTHROCYTE [DISTWIDTH] IN BLOOD BY AUTOMATED COUNT: 13.4 % (ref 12.3–15.4)
GAMMA GLOB SERPL ELPH-MCNC: 0.9 G/DL (ref 0.4–1.8)
GLOBULIN SER CALC-MCNC: 3.3 G/DL (ref 2.2–3.9)
GLUCOSE BLDC GLUCOMTR-MCNC: 161 MG/DL (ref 65–99)
GLUCOSE BLDC GLUCOMTR-MCNC: 176 MG/DL (ref 65–99)
GLUCOSE BLDC GLUCOMTR-MCNC: 222 MG/DL (ref 65–99)
GLUCOSE BLDC GLUCOMTR-MCNC: 227 MG/DL (ref 65–99)
GLUCOSE SERPL-MCNC: 195 MG/DL (ref 65–99)
HCT VFR BLD AUTO: 37.6 % (ref 37.5–51)
HGB BLD-MCNC: 12.5 G/DL (ref 13–17.7)
LABORATORY COMMENT REPORT: ABNORMAL
M PROTEIN SERPL ELPH-MCNC: ABNORMAL G/DL
MCH RBC QN AUTO: 30.8 PG (ref 26.6–33)
MCHC RBC AUTO-ENTMCNC: 33.2 G/DL (ref 31.5–35.7)
MCV RBC AUTO: 92.6 FL (ref 79–97)
PLATELET # BLD AUTO: 413 10*3/MM3 (ref 140–450)
PMV BLD AUTO: 9.1 FL (ref 6–12)
POTASSIUM SERPL-SCNC: 3.9 MMOL/L (ref 3.5–5.2)
PROT PATTERN SERPL ELPH-IMP: ABNORMAL
PROT SERPL-MCNC: 6 G/DL (ref 6–8.5)
RBC # BLD AUTO: 4.06 10*6/MM3 (ref 4.14–5.8)
SODIUM SERPL-SCNC: 130 MMOL/L (ref 136–145)
WBC NRBC COR # BLD AUTO: 13.79 10*3/MM3 (ref 3.4–10.8)

## 2025-08-05 PROCEDURE — 86043 ACETYLCHOLN RCPTR MODLG ANTB: CPT | Performed by: INTERNAL MEDICINE

## 2025-08-05 PROCEDURE — 86042 ACETYLCHOLN RCPTR BLCKG ANTB: CPT | Performed by: INTERNAL MEDICINE

## 2025-08-05 PROCEDURE — 85027 COMPLETE CBC AUTOMATED: CPT | Performed by: INTERNAL MEDICINE

## 2025-08-05 PROCEDURE — 82948 REAGENT STRIP/BLOOD GLUCOSE: CPT | Performed by: HOSPITALIST

## 2025-08-05 PROCEDURE — 63710000001 INSULIN LISPRO (HUMAN) PER 5 UNITS: Performed by: HOSPITALIST

## 2025-08-05 PROCEDURE — 83519 RIA NONANTIBODY: CPT | Performed by: INTERNAL MEDICINE

## 2025-08-05 PROCEDURE — 80048 BASIC METABOLIC PNL TOTAL CA: CPT | Performed by: HOSPITALIST

## 2025-08-05 PROCEDURE — 82948 REAGENT STRIP/BLOOD GLUCOSE: CPT

## 2025-08-05 PROCEDURE — 86041 ACETYLCHOLN RCPTR BNDNG ANTB: CPT | Performed by: INTERNAL MEDICINE

## 2025-08-05 PROCEDURE — 92610 EVALUATE SWALLOWING FUNCTION: CPT

## 2025-08-05 RX ADMIN — INSULIN LISPRO 2 UNITS: 100 INJECTION, SOLUTION INTRAVENOUS; SUBCUTANEOUS at 17:54

## 2025-08-05 RX ADMIN — Medication 10 ML: at 20:23

## 2025-08-05 RX ADMIN — INSULIN LISPRO 3 UNITS: 100 INJECTION, SOLUTION INTRAVENOUS; SUBCUTANEOUS at 22:26

## 2025-08-05 RX ADMIN — POLYETHYLENE GLYCOL 3350 17 G: 17 POWDER, FOR SOLUTION ORAL at 10:47

## 2025-08-05 RX ADMIN — OXAZEPAM 15 MG: 15 CAPSULE, GELATIN COATED ORAL at 20:23

## 2025-08-05 RX ADMIN — CETIRIZINE HYDROCHLORIDE 10 MG: 10 TABLET, FILM COATED ORAL at 10:48

## 2025-08-05 RX ADMIN — RANOLAZINE 500 MG: 500 TABLET, FILM COATED, EXTENDED RELEASE ORAL at 10:48

## 2025-08-05 RX ADMIN — Medication 10 ML: at 10:42

## 2025-08-05 RX ADMIN — ATORVASTATIN CALCIUM 20 MG: 20 TABLET, FILM COATED ORAL at 20:23

## 2025-08-05 RX ADMIN — RANOLAZINE 500 MG: 500 TABLET, FILM COATED, EXTENDED RELEASE ORAL at 20:23

## 2025-08-05 RX ADMIN — ASPIRIN 81 MG: 81 TABLET, COATED ORAL at 10:48

## 2025-08-05 RX ADMIN — ATENOLOL 12.5 MG: 25 TABLET ORAL at 20:23

## 2025-08-05 RX ADMIN — INSULIN LISPRO 3 UNITS: 100 INJECTION, SOLUTION INTRAVENOUS; SUBCUTANEOUS at 12:49

## 2025-08-06 ENCOUNTER — APPOINTMENT (OUTPATIENT)
Dept: GENERAL RADIOLOGY | Facility: HOSPITAL | Age: 89
DRG: 645 | End: 2025-08-06
Payer: MEDICARE

## 2025-08-06 LAB
ALBUMIN SERPL-MCNC: 3.3 G/DL (ref 3.5–5.2)
ALBUMIN/GLOB SERPL: 0.8 G/DL
ALP SERPL-CCNC: 132 U/L (ref 39–117)
ALT SERPL W P-5'-P-CCNC: 36 U/L (ref 1–41)
ANION GAP SERPL CALCULATED.3IONS-SCNC: 13 MMOL/L (ref 5–15)
AST SERPL-CCNC: 39 U/L (ref 1–40)
BASOPHILS # BLD AUTO: 0.04 10*3/MM3 (ref 0–0.2)
BASOPHILS NFR BLD AUTO: 0.4 % (ref 0–1.5)
BILIRUB SERPL-MCNC: 0.6 MG/DL (ref 0–1.2)
BUN SERPL-MCNC: 30 MG/DL (ref 8–23)
BUN/CREAT SERPL: 42.3 (ref 7–25)
CALCIUM SPEC-SCNC: 9.8 MG/DL (ref 8.6–10.5)
CHLORIDE SERPL-SCNC: 96 MMOL/L (ref 98–107)
CO2 SERPL-SCNC: 27 MMOL/L (ref 22–29)
COPPER SERPL-MCNC: 103 UG/DL (ref 69–132)
CREAT SERPL-MCNC: 0.71 MG/DL (ref 0.76–1.27)
DEPRECATED RDW RBC AUTO: 45.2 FL (ref 37–54)
EGFRCR SERPLBLD CKD-EPI 2021: 87.7 ML/MIN/1.73
EOSINOPHIL # BLD AUTO: 0.11 10*3/MM3 (ref 0–0.4)
EOSINOPHIL NFR BLD AUTO: 1 % (ref 0.3–6.2)
ERYTHROCYTE [DISTWIDTH] IN BLOOD BY AUTOMATED COUNT: 13.6 % (ref 12.3–15.4)
GLOBULIN UR ELPH-MCNC: 3.9 GM/DL
GLUCOSE BLDC GLUCOMTR-MCNC: 165 MG/DL (ref 65–99)
GLUCOSE BLDC GLUCOMTR-MCNC: 177 MG/DL (ref 65–99)
GLUCOSE BLDC GLUCOMTR-MCNC: 196 MG/DL (ref 65–99)
GLUCOSE BLDC GLUCOMTR-MCNC: 198 MG/DL (ref 65–99)
GLUCOSE SERPL-MCNC: 175 MG/DL (ref 65–99)
HCT VFR BLD AUTO: 37.5 % (ref 37.5–51)
HGB BLD-MCNC: 12.6 G/DL (ref 13–17.7)
IGA SERPL-MCNC: 257 MG/DL (ref 61–437)
IGG SERPL-MCNC: 996 MG/DL (ref 603–1613)
IGM SERPL-MCNC: 52 MG/DL (ref 15–143)
IMM GRANULOCYTES # BLD AUTO: 0.07 10*3/MM3 (ref 0–0.05)
IMM GRANULOCYTES NFR BLD AUTO: 0.6 % (ref 0–0.5)
LYMPHOCYTES # BLD AUTO: 1.38 10*3/MM3 (ref 0.7–3.1)
LYMPHOCYTES NFR BLD AUTO: 12.2 % (ref 19.6–45.3)
MCH RBC QN AUTO: 30.6 PG (ref 26.6–33)
MCHC RBC AUTO-ENTMCNC: 33.6 G/DL (ref 31.5–35.7)
MCV RBC AUTO: 91 FL (ref 79–97)
MONOCYTES # BLD AUTO: 1.64 10*3/MM3 (ref 0.1–0.9)
MONOCYTES NFR BLD AUTO: 14.5 % (ref 5–12)
NEUTROPHILS NFR BLD AUTO: 71.3 % (ref 42.7–76)
NEUTROPHILS NFR BLD AUTO: 8.04 10*3/MM3 (ref 1.7–7)
NRBC BLD AUTO-RTO: 0 /100 WBC (ref 0–0.2)
PLATELET # BLD AUTO: 410 10*3/MM3 (ref 140–450)
PMV BLD AUTO: 9.2 FL (ref 6–12)
POTASSIUM SERPL-SCNC: 4 MMOL/L (ref 3.5–5.2)
PROT PATTERN SERPL IFE-IMP: NORMAL
PROT SERPL-MCNC: 7.2 G/DL (ref 6–8.5)
RBC # BLD AUTO: 4.12 10*6/MM3 (ref 4.14–5.8)
SODIUM SERPL-SCNC: 136 MMOL/L (ref 136–145)
WBC NRBC COR # BLD AUTO: 11.28 10*3/MM3 (ref 3.4–10.8)

## 2025-08-06 PROCEDURE — 99233 SBSQ HOSP IP/OBS HIGH 50: CPT | Performed by: PHYSICIAN ASSISTANT

## 2025-08-06 PROCEDURE — 86255 FLUORESCENT ANTIBODY SCREEN: CPT | Performed by: PHYSICIAN ASSISTANT

## 2025-08-06 PROCEDURE — 86043 ACETYLCHOLN RCPTR MODLG ANTB: CPT | Performed by: PHYSICIAN ASSISTANT

## 2025-08-06 PROCEDURE — 86041 ACETYLCHOLN RCPTR BNDNG ANTB: CPT | Performed by: PHYSICIAN ASSISTANT

## 2025-08-06 PROCEDURE — 92611 MOTION FLUOROSCOPY/SWALLOW: CPT

## 2025-08-06 PROCEDURE — 63710000001 INSULIN LISPRO (HUMAN) PER 5 UNITS: Performed by: HOSPITALIST

## 2025-08-06 PROCEDURE — 86042 ACETYLCHOLN RCPTR BLCKG ANTB: CPT | Performed by: PHYSICIAN ASSISTANT

## 2025-08-06 PROCEDURE — 74230 X-RAY XM SWLNG FUNCJ C+: CPT

## 2025-08-06 PROCEDURE — 83519 RIA NONANTIBODY: CPT | Performed by: PHYSICIAN ASSISTANT

## 2025-08-06 PROCEDURE — 80053 COMPREHEN METABOLIC PANEL: CPT | Performed by: HOSPITALIST

## 2025-08-06 PROCEDURE — 85025 COMPLETE CBC W/AUTO DIFF WBC: CPT | Performed by: HOSPITALIST

## 2025-08-06 PROCEDURE — 82948 REAGENT STRIP/BLOOD GLUCOSE: CPT | Performed by: HOSPITALIST

## 2025-08-06 PROCEDURE — 82948 REAGENT STRIP/BLOOD GLUCOSE: CPT

## 2025-08-06 RX ORDER — SODIUM CHLORIDE 9 MG/ML
75 INJECTION, SOLUTION INTRAVENOUS CONTINUOUS
Status: DISCONTINUED | OUTPATIENT
Start: 2025-08-06 | End: 2025-08-07

## 2025-08-06 RX ADMIN — ATENOLOL 12.5 MG: 25 TABLET ORAL at 22:24

## 2025-08-06 RX ADMIN — OXAZEPAM 15 MG: 15 CAPSULE, GELATIN COATED ORAL at 22:24

## 2025-08-06 RX ADMIN — INSULIN LISPRO 2 UNITS: 100 INJECTION, SOLUTION INTRAVENOUS; SUBCUTANEOUS at 22:23

## 2025-08-06 RX ADMIN — Medication 10 ML: at 10:05

## 2025-08-06 RX ADMIN — BARIUM SULFATE 55 ML: 0.81 POWDER, FOR SUSPENSION ORAL at 08:28

## 2025-08-06 RX ADMIN — PANTOPRAZOLE SODIUM 40 MG: 40 TABLET, DELAYED RELEASE ORAL at 06:09

## 2025-08-06 RX ADMIN — BARIUM SULFATE 4 ML: 980 POWDER, FOR SUSPENSION ORAL at 08:28

## 2025-08-06 RX ADMIN — SODIUM CHLORIDE 50 ML/HR: 9 INJECTION, SOLUTION INTRAVENOUS at 10:32

## 2025-08-06 RX ADMIN — INSULIN LISPRO 2 UNITS: 100 INJECTION, SOLUTION INTRAVENOUS; SUBCUTANEOUS at 18:59

## 2025-08-06 RX ADMIN — INSULIN LISPRO 2 UNITS: 100 INJECTION, SOLUTION INTRAVENOUS; SUBCUTANEOUS at 13:24

## 2025-08-06 RX ADMIN — BARIUM SULFATE 50 ML: 400 SUSPENSION ORAL at 08:28

## 2025-08-06 RX ADMIN — Medication 10 ML: at 22:24

## 2025-08-06 RX ADMIN — RANOLAZINE 500 MG: 500 TABLET, FILM COATED, EXTENDED RELEASE ORAL at 22:24

## 2025-08-06 RX ADMIN — ATORVASTATIN CALCIUM 20 MG: 20 TABLET, FILM COATED ORAL at 22:24

## 2025-08-07 LAB
ANION GAP SERPL CALCULATED.3IONS-SCNC: 13.6 MMOL/L (ref 5–15)
BUN SERPL-MCNC: 30 MG/DL (ref 8–23)
BUN/CREAT SERPL: 49.2 (ref 7–25)
CALCIUM SPEC-SCNC: 9.6 MG/DL (ref 8.6–10.5)
CHLORIDE SERPL-SCNC: 102 MMOL/L (ref 98–107)
CO2 SERPL-SCNC: 24.4 MMOL/L (ref 22–29)
CREAT SERPL-MCNC: 0.61 MG/DL (ref 0.76–1.27)
EGFRCR SERPLBLD CKD-EPI 2021: 91.8 ML/MIN/1.73
GLUCOSE BLDC GLUCOMTR-MCNC: 147 MG/DL (ref 65–99)
GLUCOSE BLDC GLUCOMTR-MCNC: 169 MG/DL (ref 65–99)
GLUCOSE SERPL-MCNC: 165 MG/DL (ref 65–99)
POTASSIUM SERPL-SCNC: 4 MMOL/L (ref 3.5–5.2)
SODIUM SERPL-SCNC: 140 MMOL/L (ref 136–145)
WHOLE BLOOD HOLD SPECIMEN: NORMAL

## 2025-08-07 PROCEDURE — 82948 REAGENT STRIP/BLOOD GLUCOSE: CPT

## 2025-08-07 PROCEDURE — 99232 SBSQ HOSP IP/OBS MODERATE 35: CPT | Performed by: INTERNAL MEDICINE

## 2025-08-07 PROCEDURE — 63710000001 INSULIN LISPRO (HUMAN) PER 5 UNITS: Performed by: HOSPITALIST

## 2025-08-07 PROCEDURE — 80048 BASIC METABOLIC PNL TOTAL CA: CPT | Performed by: HOSPITALIST

## 2025-08-07 PROCEDURE — 25810000003 SODIUM CHLORIDE 0.9 % SOLUTION: Performed by: HOSPITALIST

## 2025-08-07 RX ORDER — LORAZEPAM 0.5 MG/1
0.5 TABLET ORAL EVERY 12 HOURS PRN
Status: ACTIVE | OUTPATIENT
Start: 2025-08-07 | End: 2025-08-12

## 2025-08-07 RX ADMIN — Medication 10 ML: at 11:50

## 2025-08-07 RX ADMIN — RANOLAZINE 500 MG: 500 TABLET, FILM COATED, EXTENDED RELEASE ORAL at 21:10

## 2025-08-07 RX ADMIN — ATENOLOL 12.5 MG: 25 TABLET ORAL at 21:10

## 2025-08-07 RX ADMIN — INSULIN LISPRO 2 UNITS: 100 INJECTION, SOLUTION INTRAVENOUS; SUBCUTANEOUS at 08:50

## 2025-08-07 RX ADMIN — Medication 10 ML: at 21:13

## 2025-08-07 RX ADMIN — Medication 30 G: at 11:50

## 2025-08-07 RX ADMIN — SODIUM CHLORIDE 75 ML/HR: 9 INJECTION, SOLUTION INTRAVENOUS at 02:01

## 2025-08-07 RX ADMIN — CETIRIZINE HYDROCHLORIDE 10 MG: 10 TABLET, FILM COATED ORAL at 11:50

## 2025-08-08 LAB
ARSENIC BLD-MCNC: 3 UG/L (ref 0–9)
CRYOGLOB SER QL 1D COLD INC: NORMAL
LEAD BLDV-MCNC: <1 UG/DL (ref 0–3.4)
MERCURY BLD-MCNC: <1 UG/L (ref 0–14.9)

## 2025-08-08 PROCEDURE — 97530 THERAPEUTIC ACTIVITIES: CPT

## 2025-08-08 RX ORDER — AMOXICILLIN 250 MG
1 CAPSULE ORAL 2 TIMES DAILY
Status: DISCONTINUED | OUTPATIENT
Start: 2025-08-08 | End: 2025-08-12 | Stop reason: HOSPADM

## 2025-08-08 RX ADMIN — POLYETHYLENE GLYCOL 3350 17 G: 17 POWDER, FOR SOLUTION ORAL at 09:10

## 2025-08-08 RX ADMIN — RANOLAZINE 500 MG: 500 TABLET, FILM COATED, EXTENDED RELEASE ORAL at 09:11

## 2025-08-08 RX ADMIN — ATENOLOL 12.5 MG: 25 TABLET ORAL at 21:05

## 2025-08-08 RX ADMIN — PANTOPRAZOLE SODIUM 40 MG: 40 TABLET, DELAYED RELEASE ORAL at 05:45

## 2025-08-08 RX ADMIN — Medication 30 G: at 09:12

## 2025-08-08 RX ADMIN — ASPIRIN 81 MG: 81 TABLET, COATED ORAL at 09:11

## 2025-08-08 RX ADMIN — Medication 10 ML: at 09:11

## 2025-08-08 RX ADMIN — CETIRIZINE HYDROCHLORIDE 10 MG: 10 TABLET, FILM COATED ORAL at 09:11

## 2025-08-08 RX ADMIN — DOCUSATE SODIUM AND SENNOSIDES 1 TABLET: 8.6; 5 TABLET, FILM COATED ORAL at 21:05

## 2025-08-08 RX ADMIN — Medication 10 ML: at 21:06

## 2025-08-09 RX ORDER — ASPIRIN 81 MG/1
81 TABLET, CHEWABLE ORAL DAILY
Status: DISCONTINUED | OUTPATIENT
Start: 2025-08-09 | End: 2025-08-12 | Stop reason: HOSPADM

## 2025-08-09 RX ADMIN — Medication 10 ML: at 10:33

## 2025-08-09 RX ADMIN — OXAZEPAM 15 MG: 15 CAPSULE, GELATIN COATED ORAL at 21:36

## 2025-08-09 RX ADMIN — POLYETHYLENE GLYCOL 3350 17 G: 17 POWDER, FOR SOLUTION ORAL at 10:32

## 2025-08-09 RX ADMIN — ASPIRIN 81 MG CHEWABLE TABLET 81 MG: 81 TABLET CHEWABLE at 10:32

## 2025-08-09 RX ADMIN — CETIRIZINE HYDROCHLORIDE 10 MG: 10 TABLET, FILM COATED ORAL at 10:32

## 2025-08-09 RX ADMIN — ATENOLOL 12.5 MG: 25 TABLET ORAL at 21:27

## 2025-08-09 RX ADMIN — LANSOPRAZOLE 30 MG: 15 TABLET, ORALLY DISINTEGRATING ORAL at 10:32

## 2025-08-09 RX ADMIN — DOCUSATE SODIUM AND SENNOSIDES 1 TABLET: 8.6; 5 TABLET, FILM COATED ORAL at 10:34

## 2025-08-09 RX ADMIN — Medication 10 ML: at 21:27

## 2025-08-09 RX ADMIN — Medication 30 G: at 10:32

## 2025-08-10 RX ORDER — ISOSORBIDE DINITRATE 10 MG/1
10 TABLET ORAL
Status: DISCONTINUED | OUTPATIENT
Start: 2025-08-10 | End: 2025-08-12 | Stop reason: HOSPADM

## 2025-08-10 RX ORDER — OXAZEPAM 10 MG
10 CAPSULE ORAL NIGHTLY
Status: DISCONTINUED | OUTPATIENT
Start: 2025-08-10 | End: 2025-08-12 | Stop reason: HOSPADM

## 2025-08-10 RX ADMIN — ASPIRIN 81 MG CHEWABLE TABLET 81 MG: 81 TABLET CHEWABLE at 08:31

## 2025-08-10 RX ADMIN — OXAZEPAM 10 MG: 10 CAPSULE, GELATIN COATED ORAL at 21:44

## 2025-08-10 RX ADMIN — LANSOPRAZOLE 30 MG: 15 TABLET, ORALLY DISINTEGRATING ORAL at 08:32

## 2025-08-10 RX ADMIN — ATENOLOL 12.5 MG: 25 TABLET ORAL at 21:44

## 2025-08-10 RX ADMIN — ISOSORBIDE DINITRATE 10 MG: 10 TABLET ORAL at 18:38

## 2025-08-10 RX ADMIN — POLYETHYLENE GLYCOL 3350 17 G: 17 POWDER, FOR SOLUTION ORAL at 08:33

## 2025-08-10 RX ADMIN — Medication 10 ML: at 08:38

## 2025-08-10 RX ADMIN — CETIRIZINE HYDROCHLORIDE 10 MG: 10 TABLET, FILM COATED ORAL at 08:31

## 2025-08-10 RX ADMIN — Medication 15 G: at 21:42

## 2025-08-10 RX ADMIN — DOCUSATE SODIUM AND SENNOSIDES 1 TABLET: 8.6; 5 TABLET, FILM COATED ORAL at 08:33

## 2025-08-10 RX ADMIN — Medication 10 ML: at 21:44

## 2025-08-10 RX ADMIN — DOCUSATE SODIUM AND SENNOSIDES 1 TABLET: 8.6; 5 TABLET, FILM COATED ORAL at 21:45

## 2025-08-11 PROCEDURE — 97530 THERAPEUTIC ACTIVITIES: CPT

## 2025-08-11 RX ORDER — CALCIUM CARBONATE 500 MG/1
2 TABLET, CHEWABLE ORAL 3 TIMES DAILY PRN
Status: DISCONTINUED | OUTPATIENT
Start: 2025-08-11 | End: 2025-08-12 | Stop reason: HOSPADM

## 2025-08-11 RX ADMIN — ASPIRIN 81 MG CHEWABLE TABLET 81 MG: 81 TABLET CHEWABLE at 08:50

## 2025-08-11 RX ADMIN — Medication 10 ML: at 09:42

## 2025-08-11 RX ADMIN — Medication 30 G: at 08:51

## 2025-08-11 RX ADMIN — DOCUSATE SODIUM AND SENNOSIDES 1 TABLET: 8.6; 5 TABLET, FILM COATED ORAL at 08:51

## 2025-08-11 RX ADMIN — DOCUSATE SODIUM AND SENNOSIDES 1 TABLET: 8.6; 5 TABLET, FILM COATED ORAL at 21:03

## 2025-08-11 RX ADMIN — ISOSORBIDE DINITRATE 10 MG: 10 TABLET ORAL at 08:51

## 2025-08-11 RX ADMIN — ISOSORBIDE DINITRATE 10 MG: 10 TABLET ORAL at 13:46

## 2025-08-11 RX ADMIN — CETIRIZINE HYDROCHLORIDE 10 MG: 10 TABLET, FILM COATED ORAL at 08:51

## 2025-08-11 RX ADMIN — ATENOLOL 12.5 MG: 25 TABLET ORAL at 21:03

## 2025-08-11 RX ADMIN — Medication 10 ML: at 21:03

## 2025-08-11 RX ADMIN — POLYETHYLENE GLYCOL 3350 17 G: 17 POWDER, FOR SOLUTION ORAL at 08:52

## 2025-08-11 RX ADMIN — OXAZEPAM 10 MG: 10 CAPSULE, GELATIN COATED ORAL at 21:03

## 2025-08-12 VITALS
HEIGHT: 70 IN | BODY MASS INDEX: 25.15 KG/M2 | SYSTOLIC BLOOD PRESSURE: 130 MMHG | WEIGHT: 175.71 LBS | OXYGEN SATURATION: 96 % | HEART RATE: 75 BPM | TEMPERATURE: 96.5 F | RESPIRATION RATE: 16 BRPM | DIASTOLIC BLOOD PRESSURE: 67 MMHG

## 2025-08-12 PROBLEM — F41.9 ANXIETY: Status: ACTIVE | Noted: 2025-08-12

## 2025-08-12 PROCEDURE — 92526 ORAL FUNCTION THERAPY: CPT

## 2025-08-12 RX ORDER — OXAZEPAM 10 MG
10 CAPSULE ORAL NIGHTLY
Qty: 3 CAPSULE | Refills: 0 | Status: SHIPPED | OUTPATIENT
Start: 2025-08-12

## 2025-08-12 RX ORDER — LANSOPRAZOLE 30 MG/1
30 TABLET, ORALLY DISINTEGRATING, DELAYED RELEASE ORAL
Qty: 30 TABLET | Refills: 0 | Status: SHIPPED | OUTPATIENT
Start: 2025-08-13

## 2025-08-12 RX ORDER — ISOSORBIDE DINITRATE 10 MG/1
10 TABLET ORAL
Qty: 90 TABLET | Refills: 0 | Status: SHIPPED | OUTPATIENT
Start: 2025-08-12

## 2025-08-12 RX ORDER — AMOXICILLIN 250 MG
1 CAPSULE ORAL 2 TIMES DAILY
Qty: 60 TABLET | Refills: 0 | Status: SHIPPED | OUTPATIENT
Start: 2025-08-12

## 2025-08-12 RX ORDER — POLYETHYLENE GLYCOL 3350 17 G/17G
17 POWDER, FOR SOLUTION ORAL DAILY
Qty: 510 G | Refills: 0 | Status: SHIPPED | OUTPATIENT
Start: 2025-08-13

## 2025-08-12 RX ADMIN — ISOSORBIDE DINITRATE 10 MG: 10 TABLET ORAL at 13:33

## 2025-08-12 RX ADMIN — LANSOPRAZOLE 30 MG: 15 TABLET, ORALLY DISINTEGRATING ORAL at 05:00

## 2025-08-12 RX ADMIN — CETIRIZINE HYDROCHLORIDE 10 MG: 10 TABLET, FILM COATED ORAL at 11:11

## 2025-08-12 RX ADMIN — ISOSORBIDE DINITRATE 10 MG: 10 TABLET ORAL at 11:11

## 2025-08-12 RX ADMIN — ASPIRIN 81 MG CHEWABLE TABLET 81 MG: 81 TABLET CHEWABLE at 11:12

## 2025-08-12 RX ADMIN — Medication 10 ML: at 11:12

## 2025-08-12 RX ADMIN — DOCUSATE SODIUM AND SENNOSIDES 1 TABLET: 8.6; 5 TABLET, FILM COATED ORAL at 11:11

## 2025-08-18 LAB
ACHR BIND AB SER-SCNC: <0.07 NMOL/L (ref 0–0.24)
ACHR BLOCK AB SER-ACNC: 24 % (ref 0–25)
ACHR MOD AB SER QL FC: 0 % (ref 0–45)
MUSK AB SER IA-ACNC: <1 U/ML
REFLEX INFORMATION: NORMAL

## 2025-08-19 LAB
ACHR BIND AB SER-SCNC: <0.07 NMOL/L (ref 0–0.24)
ACHR BLOCK AB SER-ACNC: 23 % (ref 0–25)
ACHR MOD AB SER QL FC: 0 % (ref 0–45)
MUSK AB SER IA-ACNC: <1 U/ML
REFLEX INFORMATION: NORMAL
STRIA MUS AB TITR SER IF: NEGATIVE {TITER}

## 2025-08-21 ENCOUNTER — TELEPHONE (OUTPATIENT)
Dept: NEUROLOGY | Facility: CLINIC | Age: 89
End: 2025-08-21
Payer: MEDICARE

## (undated) DEVICE — APPL CHLORAPREP HI/LITE 26ML ORNG

## (undated) DEVICE — UNDRGLV SURG BIOGEL PUNCTUREINDICATION SZ7 PF STRL

## (undated) DEVICE — THE DEVICE IS A DISPOSABLE, LIGATURE PASSING, SUTURING APPARATUS AND NEEDLE GUIDE FOR THE ABDOMINAL WALL WHICH IS NON-POWERED, HAND-HELD, AND HAND-MANIPULATED,INTENDED TO BE USED IN VARIOUS GENERAL SURGICAL PROCEDURES. THE DEVICE INCLUDES A LIGATURE CARRIER PATHWAY, NEEDLE GUIDE, TWO NEEDLES, REFERENCE PLANE T-BAR, AND A GUIDEWIRE. THE HANDLE OF THE DEVICE PROVIDES TWO DIAMETRICALLY OPPOSED ENCLOSED GUIDEWAYS FOR THE ADVANCEMENT AND RETRACTION OF THE NEEDLES UNDER MANUAL CONTROL OF A PLUNGER LOCATED AT THE PROXIMAL END OF THE DEVICE.AS PART OF THE M-CLOSE CONVENIENCE KIT, A NERVE BLOCK NEEDLE IS INCLUDED FOR THE ADMINISTRATION OF LOCAL ANESTHETIC AGENTS TO PROVIDE REGIONAL AND LOCAL ANESTHESIA.  A TELFA ANTIMICROBIAL, NON-ADHERENT PAD IS ALSO PROVIDED IN THE KIT FOR USE AS A PRIMARY DRESSING FOR THE SURGICAL INCISION.: Brand: M-CLOSE KIT

## (undated) DEVICE — Device

## (undated) DEVICE — STPLR SKIN VISISTAT WD 35CT

## (undated) DEVICE — JACKSON-PRATT 100CC BULB RESERVOIR: Brand: CARDINAL HEALTH

## (undated) DEVICE — STPCK 3WY D201 DISCOFIX

## (undated) DEVICE — DISPOSABLE MONOPOLAR ENDOSCOPIC CORD 10 FT. (3M): Brand: KIRWAN

## (undated) DEVICE — SYR LUERLOK 20CC BX/50

## (undated) DEVICE — LAPAROSCOPIC SMOKE FILTRATION SYSTEM: Brand: PALL LAPAROSHIELD® PLUS LAPAROSCOPIC SMOKE FILTRATION SYSTEM

## (undated) DEVICE — GLV SURG BIOGEL M LTX PF 6 1/2

## (undated) DEVICE — ENDOPATH XCEL UNIVERSAL TROCAR STABLILITY SLEEVES: Brand: ENDOPATH XCEL

## (undated) DEVICE — APPL HEMO SURG ARISTA/AH/FLEXITIP XL 38CM

## (undated) DEVICE — ADHS SKIN SURG TISS VISC PREMIERPRO EXOFIN HI/VISC FAST/DRY

## (undated) DEVICE — ENDOPOUCH RETRIEVER SPECIMEN RETRIEVAL BAGS: Brand: ENDOPOUCH RETRIEVER

## (undated) DEVICE — ENDOPATH XCEL BLADELESS TROCARS WITH STABILITY SLEEVES: Brand: ENDOPATH XCEL

## (undated) DEVICE — SOL NACL 0.9PCT 1000ML

## (undated) DEVICE — LAPAROVUE VISIBILITY SYSTEM LAPAROSCOPIC SOLUTIONS: Brand: LAPAROVUE

## (undated) DEVICE — SUT MNCRYL PLS ANTIB UD 4/0 PS2 18IN

## (undated) DEVICE — COVER,C-ARM,41X74: Brand: MEDLINE

## (undated) DEVICE — PK LAP CHOLE BG

## (undated) DEVICE — SUT VIC 0 UR6 27IN VCP603H

## (undated) DEVICE — DBD-DRAPE,LAP,CHOLE,W/TROUGHS,STERILE: Brand: MEDLINE

## (undated) DEVICE — 3M™ STERI-DRAPE™ INSTRUMENT POUCH 1018L: Brand: STERI-DRAPE™

## (undated) DEVICE — SYR LL TP 10ML STRL